# Patient Record
Sex: MALE | Race: WHITE | NOT HISPANIC OR LATINO | Employment: OTHER | URBAN - NONMETROPOLITAN AREA
[De-identification: names, ages, dates, MRNs, and addresses within clinical notes are randomized per-mention and may not be internally consistent; named-entity substitution may affect disease eponyms.]

---

## 2017-03-16 ENCOUNTER — APPOINTMENT (OUTPATIENT)
Dept: CT IMAGING | Facility: HOSPITAL | Age: 67
End: 2017-03-16

## 2017-03-16 ENCOUNTER — HOSPITAL ENCOUNTER (EMERGENCY)
Facility: HOSPITAL | Age: 67
Discharge: HOME | End: 2017-03-17
Attending: EMERGENCY MEDICINE | Admitting: EMERGENCY MEDICINE

## 2017-03-16 DIAGNOSIS — R51.9 HEADACHE, UNSPECIFIED HEADACHE TYPE: Primary | ICD-10-CM
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
## 2017-03-16 PROCEDURE — 70450 CT HEAD/BRAIN W/O DYE: CPT

## 2017-03-16 PROCEDURE — 99283 EMERGENCY DEPT VISIT LOW MDM: CPT

## 2017-03-16 PROCEDURE — 70450 CT HEAD/BRAIN W/O DYE: CPT | Performed by: RADIOLOGY

## 2017-03-17 VITALS
WEIGHT: 170 LBS | SYSTOLIC BLOOD PRESSURE: 125 MMHG | HEIGHT: 70 IN | TEMPERATURE: 97.6 F | RESPIRATION RATE: 18 BRPM | DIASTOLIC BLOOD PRESSURE: 71 MMHG | HEART RATE: 63 BPM | BODY MASS INDEX: 24.34 KG/M2 | OXYGEN SATURATION: 100 %
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
## 2017-06-12 ENCOUNTER — HOSPITAL ENCOUNTER (OUTPATIENT)
Dept: GENERAL RADIOLOGY | Facility: HOSPITAL | Age: 67
Discharge: HOME | End: 2017-06-12
Attending: INTERNAL MEDICINE | Admitting: INTERNAL MEDICINE

## 2017-06-12 ENCOUNTER — TRANSCRIBE ORDERS (OUTPATIENT)
Dept: GENERAL RADIOLOGY | Facility: HOSPITAL | Age: 67
End: 2017-06-12

## 2017-06-12 DIAGNOSIS — R07.81 RIB PAIN: ICD-10-CM

## 2017-06-12 DIAGNOSIS — R07.81 RIB PAIN: Primary | ICD-10-CM

## 2017-06-12 PROCEDURE — 71100 X-RAY EXAM RIBS UNI 2 VIEWS: CPT

## 2017-06-12 PROCEDURE — 71100 X-RAY EXAM RIBS UNI 2 VIEWS: CPT | Performed by: RADIOLOGY

## 2017-09-25 ENCOUNTER — EPISODE CHANGES (OUTPATIENT)
Dept: CASE MANAGEMENT | Facility: OTHER | Age: 67
End: 2017-09-25

## 2017-12-19 ENCOUNTER — OFFICE VISIT (OUTPATIENT)
Dept: CARDIOLOGY | Facility: CLINIC | Age: 67
End: 2017-12-19

## 2017-12-19 VITALS
WEIGHT: 170 LBS | DIASTOLIC BLOOD PRESSURE: 70 MMHG | HEART RATE: 54 BPM | BODY MASS INDEX: 24.34 KG/M2 | SYSTOLIC BLOOD PRESSURE: 118 MMHG | HEIGHT: 70 IN

## 2017-12-19 DIAGNOSIS — I25.10 CORONARY ARTERY DISEASE INVOLVING NATIVE CORONARY ARTERY OF NATIVE HEART WITHOUT ANGINA PECTORIS: Primary | ICD-10-CM

## 2017-12-19 DIAGNOSIS — E78.5 DYSLIPIDEMIA: ICD-10-CM

## 2017-12-19 DIAGNOSIS — I10 ESSENTIAL HYPERTENSION: ICD-10-CM

## 2017-12-19 PROCEDURE — 99214 OFFICE O/P EST MOD 30 MIN: CPT | Performed by: PHYSICIAN ASSISTANT

## 2017-12-19 NOTE — PROGRESS NOTES
Encounter Date:12/19/2017      Patient ID: Alfredito Marcus is a 67 y.o. male.    Chief Complaint: Coronary Artery Disease      PROBLEM LIST:  Patient Active Problem List    Diagnosis   • CAD (coronary artery disease) [I25.10]               a. Unstable angina with abnormal Cardiolite stress test, June 2008.  b. Cardiac cath by Dr. Garcia, 06/09/2008, showed 99% stenosis of the LAD treated with placement of two overlapping Cypher drug-coated stents along with balloon angioplasty of the first diagonal.  Rest of the vessels is free from significant disease and LV function was normal.  c. Cardiolite stress test, 03/23/2011, showed mild apical ischemia with ejection fraction 64%.  d. Cardiac cath by Dr. Garcia, November 2011, showed patent stents of the LAD, no significant disease, normal LV function.  e. [facility] 5-31-16: [place]: Non- flow-limiting coronary artery disease:.Widely patent LAD stents. Mild (20%-30%) disease in the LAD and RCA. . Normal left ventricular function.      • Hypertension [I10]                  • Dyslipidemia [E78.5]                  • Osteoarthritis [M19.90]   • Gout [M10.9]   • History of gluten intolerance [Z87.19]   • H/O degenerative disc disease [Z87.39]     Overview Note:     Degenerative disc disease of the cervical and lumbar spine/chronic back pain     • Closed fracture of proximal end of left humerus [S42.202A]         History of Present Illness  Patient presents today for follow-up with a history of CAD, hypertension, dyslipidemia.  Since her last visit his primary care physician has diagnosed diabetes mellitus type 2 and started him on metformin.  He has no current complaint of exertional chest pain or dyspnea, no orthopnea no PND no claudication.  He has no awareness of tachycardia arrhythmias, no dizziness or syncope.  He checks his blood pressure at home which generally runs about 125 systolic.  His cholesterol was last checked 2-3 months ago by primary care and reported to be  favorable.  On review of his medications I note that he is taking his Coreg 6.25 mg on a daily (a.m.) regimen.  He states he was having heart rates in the 50s which were asymptomatic and changed his regimen on his own.  He is scheduled to have right total hip replacement on March 20 of this year.  With the exception of Coreg he is otherwise compliant with his medications reports no significant side effects    Allergies   Allergen Reactions   • Iodine    • Shrimp (Diagnostic)          Current Outpatient Prescriptions:   •  ALPRAZolam (XANAX) 0.25 MG tablet, Take 0.25 mg by mouth 3 (three) times a day as needed for anxiety., Disp: , Rfl:   •  amLODIPine (NORVASC) 10 MG tablet, Take 10 mg by mouth daily., Disp: , Rfl:   •  aspirin  MG tablet, Take 325 mg by mouth every night., Disp: , Rfl:   •  carvedilol (COREG) 6.25 MG tablet, Take 6.25 mg by mouth, taking Once daily., Disp: , Rfl:   •  clopidogrel (PLAVIX) 75 MG tablet, Take 75 mg by mouth daily., Disp: , Rfl:   •  Coenzyme Q10 (COQ10 PO), Take 100 mg by mouth daily., Disp: , Rfl:   •  docusate sodium (COLACE) 100 MG capsule, Take 300 mg by mouth every night., Disp: , Rfl:   •  lansoprazole (PREVACID) 30 MG capsule, Take 30 mg by mouth daily., Disp: , Rfl:   •  latanoprost (XALATAN) 0.005 % ophthalmic solution, Administer 1 drop to the right eye every night., Disp: , Rfl:   •  metFORMIN (GLUCOPHAGE) 500 MG tablet, Take 750 mg by mouth Daily With Breakfast., Disp: , Rfl:   •  Omega-3-Acid Eth Est, Dietary, 1 G capsule, Take 1 capsule by mouth 2 (two) times a day., Disp: , Rfl:   •  pravastatin (PRAVACHOL) 20 MG tablet, Take 20 mg by mouth every night., Disp: , Rfl:   •  ranitidine (ZANTAC) 150 MG tablet, Take 150 mg by mouth every night., Disp: , Rfl:   •  tamsulosin (FLOMAX) 0.4 MG capsule 24 hr capsule, Take 1 capsule by mouth daily., Disp: , Rfl:   •  traMADol (ULTRAM) 50 MG tablet, Take 50 mg by mouth every 6 (six) hours as needed for moderate pain (4-6).,  "Disp: , Rfl:     The following portions of the patient's history were reviewed and updated as appropriate: allergies, current medications, past family history, past medical history, past social history, past surgical history and problem list.    Review of Systems   Constitution: Negative for diaphoresis, weakness, malaise/fatigue and weight gain.   Cardiovascular: Negative for chest pain, claudication, dyspnea on exertion, irregular heartbeat, leg swelling, orthopnea, palpitations, paroxysmal nocturnal dyspnea and syncope.   Respiratory: Negative for cough, shortness of breath, sleep disturbances due to breathing and wheezing.    Hematologic/Lymphatic: Negative for bleeding problem.   Musculoskeletal: Negative for muscle cramps, muscle weakness and myalgias.   Gastrointestinal: Negative for heartburn.         Objective:     Blood pressure 118/70, pulse 54, height 177.8 cm (70\"), weight 77.1 kg (170 lb).        Physical Exam   Constitutional: He is oriented to person, place, and time. He appears well-developed and well-nourished.   Cardiovascular: Normal rate, regular rhythm, normal heart sounds and intact distal pulses.  Exam reveals no gallop and no friction rub.    No murmur heard.  Pulmonary/Chest: Effort normal and breath sounds normal. No respiratory distress. He has no wheezes. He has no rales. He exhibits no tenderness.   Bases clear   Musculoskeletal: Normal range of motion. He exhibits no edema.   Neurological: He is alert and oriented to person, place, and time.         Lab Review:   Procedures       Assessment:      Diagnosis Plan   1. Coronary artery disease involving native coronary artery of native heart without angina pectoris  Stable    2. Essential hypertension  Change Coreg 6.25 mg back to twice daily dosing    3. Dyslipidemia  On statin therapy followed by primary care      Plan:   Cleared for Hip surgery from cardiac standpoint, may DC Plavix for procedure    Stable cardiac status.  Continue " current medications.   FU in 6 MO, sooner as needed.  Thank you for allowing us to participate in the care of your patient.       NILAY Yu  12/19/2017  2:04 PM      Please note that portions of this note may have been completed with a voice recognition program. Efforts were made to edit the dictations, but occasionally words are mistranscribed.

## 2018-01-09 ENCOUNTER — TRANSCRIBE ORDERS (OUTPATIENT)
Dept: ADMINISTRATIVE | Facility: HOSPITAL | Age: 68
End: 2018-01-09

## 2018-01-09 DIAGNOSIS — R10.84 ABDOMINAL PAIN, GENERALIZED: Primary | ICD-10-CM

## 2018-01-22 ENCOUNTER — HOSPITAL ENCOUNTER (OUTPATIENT)
Dept: ULTRASOUND IMAGING | Facility: HOSPITAL | Age: 68
Discharge: HOME | End: 2018-01-22
Attending: INTERNAL MEDICINE | Admitting: INTERNAL MEDICINE

## 2018-01-22 DIAGNOSIS — R10.84 ABDOMINAL PAIN, GENERALIZED: ICD-10-CM

## 2018-01-22 PROCEDURE — 76700 US EXAM ABDOM COMPLETE: CPT | Performed by: RADIOLOGY

## 2018-01-22 PROCEDURE — 76700 US EXAM ABDOM COMPLETE: CPT

## 2018-07-03 ENCOUNTER — OFFICE VISIT (OUTPATIENT)
Dept: CARDIOLOGY | Facility: CLINIC | Age: 68
End: 2018-07-03

## 2018-07-03 VITALS
HEIGHT: 71 IN | WEIGHT: 166 LBS | SYSTOLIC BLOOD PRESSURE: 100 MMHG | BODY MASS INDEX: 23.24 KG/M2 | DIASTOLIC BLOOD PRESSURE: 56 MMHG | HEART RATE: 59 BPM

## 2018-07-03 DIAGNOSIS — I25.10 CORONARY ARTERY DISEASE INVOLVING NATIVE CORONARY ARTERY OF NATIVE HEART WITHOUT ANGINA PECTORIS: Primary | ICD-10-CM

## 2018-07-03 DIAGNOSIS — E78.5 DYSLIPIDEMIA: ICD-10-CM

## 2018-07-03 DIAGNOSIS — I10 ESSENTIAL HYPERTENSION: ICD-10-CM

## 2018-07-03 PROCEDURE — 99214 OFFICE O/P EST MOD 30 MIN: CPT | Performed by: INTERNAL MEDICINE

## 2018-07-03 RX ORDER — LORATADINE 10 MG/1
10 TABLET ORAL DAILY
Status: ON HOLD | COMMUNITY
End: 2021-04-28

## 2018-07-03 RX ORDER — FOLIC ACID 1 MG/1
1 TABLET ORAL DAILY
COMMUNITY

## 2018-07-03 RX ORDER — MELATONIN
1000 2 TIMES DAILY
COMMUNITY

## 2018-07-03 NOTE — PROGRESS NOTES
Encounter Date:07/03/2018      Patient ID: Alfredito Marcus is a 68 y.o. male.    Chief Complaint: Coronary Artery Disease      PROBLEM LIST:  1. CAD  a. Unstable angina with abnormal Cardiolite stress test, June 2008.  b. Cardiac cath by Dr. Garcia, 06/09/2008, showed 99% stenosis of the LAD treated with placement of two overlapping Cypher drug-coated stents along with balloon angioplasty of the first diagonal.  Rest of the vessels is free from significant disease and LV function was normal.  c. Cardiolite stress test, 03/23/2011, showed mild apical ischemia with ejection fraction 64%.  d. Cardiac cath by Dr. Garcia, November 2011, showed patent stents of the LAD, no significant disease, normal LV function.  e. [facility] 5-31-16: [place]: Non- flow-limiting coronary artery disease:.Widely patent LAD stents. Mild (20%-30%) disease in the LAD and RCA. . Normal left ventricular function.   2. Hypertension  3. Dyslipidemia  4. Osteoarthritis   5. Gout  6. History of gluten intolerance  7. H/O degenerative disc disease  8. Closed fracture of proximal end of left humerus    History of Present Illness  Patient presents today for follow-up with a history of CAD and cardiac risk factors. Since last visit He had a hip replacement surgery which was uncomplicated.  He is now on physical therapy for bursitis.  He has no current complaint of exertional chest pain or dyspnea, and orthopnea no PND no lower extremity edema.  He has no awareness of tachycardia arrhythmias, no dizziness or syncope.  He checks his blood pressure at home and it generally runs about 117 systolic.  His last lipid panel was approximately 2 months ago with primary care and was reported to be favorable.  He reports compliance with all his medications reports no significant side effects.    Allergies   Allergen Reactions   • Iodine    • Shrimp (Diagnostic)          Current Outpatient Prescriptions:   •  ALPRAZolam (XANAX) 0.25 MG tablet, Take 0.25 mg by mouth 3  (three) times a day as needed for anxiety., Disp: , Rfl:   •  amLODIPine (NORVASC) 10 MG tablet, Take 10 mg by mouth daily., Disp: , Rfl:   •  aspirin  MG tablet, Take 325 mg by mouth every night., Disp: , Rfl:   •  carvedilol (COREG) 6.25 MG tablet, Take 6.25 mg by mouth 2 (two) times a day with meals., Disp: , Rfl:   •  cholecalciferol (VITAMIN D3) 1000 units tablet, Take 1,000 Units by mouth 2 (Two) Times a Day., Disp: , Rfl:   •  clopidogrel (PLAVIX) 75 MG tablet, Take 75 mg by mouth daily., Disp: , Rfl:   •  Coenzyme Q10 (COQ10 PO), Take 100 mg by mouth daily., Disp: , Rfl:   •  docusate sodium (COLACE) 100 MG capsule, Take 300 mg by mouth every night., Disp: , Rfl:   •  folic acid (FOLVITE) 1 MG tablet, Take 1 mg by mouth Daily., Disp: , Rfl:   •  lansoprazole (PREVACID) 30 MG capsule, Take 30 mg by mouth daily., Disp: , Rfl:   •  latanoprost (XALATAN) 0.005 % ophthalmic solution, Administer 1 drop to the right eye every night., Disp: , Rfl:   •  loratadine (CLARITIN) 10 MG tablet, Take 10 mg by mouth Daily., Disp: , Rfl:   •  Omega-3-Acid Eth Est, Dietary, 1 G capsule, Take 1 capsule by mouth 2 (two) times a day., Disp: , Rfl:   •  pravastatin (PRAVACHOL) 20 MG tablet, Take 20 mg by mouth every night., Disp: , Rfl:   •  ranitidine (ZANTAC) 150 MG tablet, Take 150 mg by mouth every night., Disp: , Rfl:   •  SITagliptin (JANUVIA) 100 MG tablet, Take 100 mg by mouth Daily., Disp: , Rfl:   •  tamsulosin (FLOMAX) 0.4 MG capsule 24 hr capsule, Take 1 capsule by mouth daily., Disp: , Rfl:   •  traMADol (ULTRAM) 50 MG tablet, Take 200 mg by mouth Daily., Disp: , Rfl:     The following portions of the patient's history were reviewed and updated as appropriate: allergies, current medications, past family history, past medical history, past social history, past surgical history and problem list.    ROS  Review of Systems   Constitution: Negative for chills, fever, weight gain and weight loss.   Cardiovascular:  "Negative for chest pain, claudication, dyspnea on exertion, leg swelling, orthopnea, palpitations, paroxysmal nocturnal dyspnea and syncope.        No dizziness   Gastrointestinal: Negative for abdominal pain, constipation, diarrhea, nausea and vomiting.   Genitourinary:        No urinary symptoms   Neurological:        No symptoms of stroke.   All other systems reviewed and are negative.    Objective:     Blood pressure 100/56, pulse 59, height 180.3 cm (71\"), weight 75.3 kg (166 lb).        Physical Exam  Constitutional: She appears well-developed and well-nourished.   HENT:   HEENT exam unremarkable.   Neck: Neck supple. No JVD present.   No carotid bruits.   Cardiovascular: Normal rate, regular rhythm and normal heart sounds.    No murmur heard.  2 plus symmetric pulses.   Pulmonary/Chest: Breath sounds normal. Does not exhibit tenderness.   Abdominal:   Abdomen benign.   Musculoskeletal: Does not exhibit edema.   Neurological:   Neurological exam unremarkable.   Vitals reviewed.    Lab Review:   Procedures       Assessment:      Diagnosis Plan   1. Coronary artery disease involving native coronary artery of native heart without angina pectoris  Stable    2. Dyslipidemia  On statin therapy followed by primary care    3. Essential hypertension  Well controlled current medical regimen      Plan:   Stable CV status.  Continue current medications.   FU in 6 MO, sooner as needed.  Thank you for allowing us to participate in the care of your patient.     Scribed for Grady Garcia MD by Jos Salvador PA-C. 7/3/2018  1:22 PM     IGrady MD, personally performed the services described in this documentation as scribed by the above named individual in my presence, and it is both accurate and complete.  7/3/2018  1:26 PM          Please note that portions of this note may have been completed with a voice recognition program. Efforts were made to edit the dictations, but occasionally words are " mistranscribed.

## 2018-07-03 NOTE — PROGRESS NOTES
Encounter Date:07/03/2018      Patient ID: Alfredito Marcus is a 68 y.o. male.    Chief Complaint: No chief complaint on file.      PROBLEM LIST:  1. ***    History of Present Illness  Patient presents today for follow-up with a history of *** and cardiac risk factors. Since last visit ***. Denies chest pain, shortness of breath, leg swelling, palpitations, and syncope. Remains busy and active with ***.    Allergies   Allergen Reactions   • Iodine    • Shrimp (Diagnostic)          Current Outpatient Prescriptions:   •  ALPRAZolam (XANAX) 0.25 MG tablet, Take 0.25 mg by mouth 3 (three) times a day as needed for anxiety., Disp: , Rfl:   •  amLODIPine (NORVASC) 10 MG tablet, Take 10 mg by mouth daily., Disp: , Rfl:   •  aspirin  MG tablet, Take 325 mg by mouth every night., Disp: , Rfl:   •  carvedilol (COREG) 6.25 MG tablet, Take 6.25 mg by mouth 2 (two) times a day with meals., Disp: , Rfl:   •  clopidogrel (PLAVIX) 75 MG tablet, Take 75 mg by mouth daily., Disp: , Rfl:   •  Coenzyme Q10 (COQ10 PO), Take 100 mg by mouth daily., Disp: , Rfl:   •  docusate sodium (COLACE) 100 MG capsule, Take 300 mg by mouth every night., Disp: , Rfl:   •  lansoprazole (PREVACID) 30 MG capsule, Take 30 mg by mouth daily., Disp: , Rfl:   •  latanoprost (XALATAN) 0.005 % ophthalmic solution, Administer 1 drop to the right eye every night., Disp: , Rfl:   •  metFORMIN (GLUCOPHAGE) 500 MG tablet, Take 750 mg by mouth Daily With Breakfast., Disp: , Rfl:   •  Omega-3-Acid Eth Est, Dietary, 1 G capsule, Take 1 capsule by mouth 2 (two) times a day., Disp: , Rfl:   •  pravastatin (PRAVACHOL) 20 MG tablet, Take 20 mg by mouth every night., Disp: , Rfl:   •  ranitidine (ZANTAC) 150 MG tablet, Take 150 mg by mouth every night., Disp: , Rfl:   •  tamsulosin (FLOMAX) 0.4 MG capsule 24 hr capsule, Take 1 capsule by mouth daily., Disp: , Rfl:   •  traMADol (ULTRAM) 50 MG tablet, Take 50 mg by mouth every 6 (six) hours as needed for moderate  pain (4-6)., Disp: , Rfl:     The following portions of the patient's history were reviewed and updated as appropriate: allergies, current medications, past family history, past medical history, past social history, past surgical history and problem list.    ROS  Review of Systems   Constitution: Negative for chills, fever, weight gain and weight loss.   Cardiovascular: Negative for chest pain, claudication, dyspnea on exertion, leg swelling, orthopnea, palpitations, paroxysmal nocturnal dyspnea and syncope.        No dizziness   Gastrointestinal: Negative for abdominal pain, constipation, diarrhea, nausea and vomiting.   Genitourinary:        No urinary symptoms   Neurological:        No symptoms of stroke.   All other systems reviewed and are negative.    Objective:     There were no vitals taken for this visit.  Repeat blood pressure measurement by, Grady Garcia MD, at ***      Physical Exam  Constitutional: She appears well-developed and well-nourished.   HENT:   HEENT exam unremarkable.   Neck: Neck supple. No JVD present.   No carotid bruits.   Cardiovascular: Normal rate, regular rhythm and normal heart sounds.    No murmur heard.  2 plus symmetric pulses.   Pulmonary/Chest: Breath sounds normal. Does not exhibit tenderness.   Abdominal:   Abdomen benign.   Musculoskeletal: Does not exhibit edema.   Neurological:   Neurological exam unremarkable.   Vitals reviewed.    Lab Review:   Procedures       Assessment:   No diagnosis found.  Plan:   ***  Continue current medications.   FU in 6 MO, sooner as needed.  Thank you for allowing us to participate in the care of your patient.     Scribed for Grady Garcia MD by Arley Briggs. 7/3/2018  11:06 AM    ***    Please note that portions of this note may have been completed with a voice recognition program. Efforts were made to edit the dictations, but occasionally words are mistranscribed.

## 2018-08-02 ENCOUNTER — TELEPHONE (OUTPATIENT)
Dept: CARDIOLOGY | Facility: CLINIC | Age: 68
End: 2018-08-02

## 2018-08-02 NOTE — TELEPHONE ENCOUNTER
Patient needs cardiac clearance for a lap sneha with Dr. Humphries to be scheduled.  Patient was last seen in the office on 7-3-18.  They would like the patient to hold plavix/ASA prior to surgery.    Please advise.    NILAY Marquez  P: [phone]  F: [phone]

## 2018-08-20 ENCOUNTER — APPOINTMENT (OUTPATIENT)
Dept: CT IMAGING | Facility: HOSPITAL | Age: 68
End: 2018-08-20

## 2018-08-20 ENCOUNTER — HOSPITAL ENCOUNTER (EMERGENCY)
Facility: HOSPITAL | Age: 68
Discharge: HOME | End: 2018-08-20
Attending: EMERGENCY MEDICINE | Admitting: EMERGENCY MEDICINE

## 2018-08-20 VITALS
BODY MASS INDEX: 23.19 KG/M2 | WEIGHT: 162 LBS | SYSTOLIC BLOOD PRESSURE: 136 MMHG | TEMPERATURE: 97.8 F | HEIGHT: 70 IN | OXYGEN SATURATION: 98 % | DIASTOLIC BLOOD PRESSURE: 81 MMHG | RESPIRATION RATE: 18 BRPM | HEART RATE: 51 BPM

## 2018-08-20 DIAGNOSIS — R10.9 ABDOMINAL PAIN, UNSPECIFIED ABDOMINAL LOCATION: Primary | ICD-10-CM

## 2018-08-20 LAB
ALBUMIN SERPL-MCNC: 4.5 G/DL (ref 3.4–4.8)
ALBUMIN/GLOB SERPL: 1.6 G/DL (ref 1.5–2.5)
ALP SERPL-CCNC: 124 U/L (ref 40–129)
ALT SERPL W P-5'-P-CCNC: 35 U/L (ref 10–44)
ANION GAP SERPL CALCULATED.3IONS-SCNC: 7.2 MMOL/L (ref 3.6–11.2)
AST SERPL-CCNC: 46 U/L (ref 10–34)
BASOPHILS # BLD AUTO: 0.06 10*3/MM3 (ref 0–0.3)
BASOPHILS NFR BLD AUTO: 0.6 % (ref 0–2)
BILIRUB SERPL-MCNC: 1.3 MG/DL (ref 0.2–1.8)
BUN BLD-MCNC: 13 MG/DL (ref 7–21)
BUN/CREAT SERPL: 13.4 (ref 7–25)
CALCIUM SPEC-SCNC: 9.3 MG/DL (ref 7.7–10)
CHLORIDE SERPL-SCNC: 102 MMOL/L (ref 99–112)
CO2 SERPL-SCNC: 28.8 MMOL/L (ref 24.3–31.9)
CREAT BLD-MCNC: 0.97 MG/DL (ref 0.43–1.29)
D-LACTATE SERPL-SCNC: 0.9 MMOL/L (ref 0.5–2)
DEPRECATED RDW RBC AUTO: 43.9 FL (ref 37–54)
EOSINOPHIL # BLD AUTO: 0.57 10*3/MM3 (ref 0–0.7)
EOSINOPHIL NFR BLD AUTO: 6.1 % (ref 0–7)
ERYTHROCYTE [DISTWIDTH] IN BLOOD BY AUTOMATED COUNT: 13.8 % (ref 11.5–14.5)
GFR SERPL CREATININE-BSD FRML MDRD: 77 ML/MIN/1.73
GLOBULIN UR ELPH-MCNC: 2.9 GM/DL
GLUCOSE BLD-MCNC: 214 MG/DL (ref 70–110)
HCT VFR BLD AUTO: 44.5 % (ref 42–52)
HGB BLD-MCNC: 15.5 G/DL (ref 14–18)
IMM GRANULOCYTES # BLD: 0.06 10*3/MM3 (ref 0–0.03)
IMM GRANULOCYTES NFR BLD: 0.6 % (ref 0–0.5)
LIPASE SERPL-CCNC: 55 U/L (ref 13–60)
LYMPHOCYTES # BLD AUTO: 2.08 10*3/MM3 (ref 1–3)
LYMPHOCYTES NFR BLD AUTO: 22.1 % (ref 16–46)
MCH RBC QN AUTO: 31 PG (ref 27–33)
MCHC RBC AUTO-ENTMCNC: 34.8 G/DL (ref 33–37)
MCV RBC AUTO: 89 FL (ref 80–94)
MONOCYTES # BLD AUTO: 0.85 10*3/MM3 (ref 0.1–0.9)
MONOCYTES NFR BLD AUTO: 9 % (ref 0–12)
NEUTROPHILS # BLD AUTO: 5.8 10*3/MM3 (ref 1.4–6.5)
NEUTROPHILS NFR BLD AUTO: 61.6 % (ref 40–75)
OSMOLALITY SERPL CALC.SUM OF ELEC: 282.2 MOSM/KG (ref 273–305)
PLATELET # BLD AUTO: 240 10*3/MM3 (ref 130–400)
PMV BLD AUTO: 10.9 FL (ref 6–10)
POTASSIUM BLD-SCNC: 3.7 MMOL/L (ref 3.5–5.3)
PROT SERPL-MCNC: 7.4 G/DL (ref 6–8)
RBC # BLD AUTO: 5 10*6/MM3 (ref 4.7–6.1)
SODIUM BLD-SCNC: 138 MMOL/L (ref 135–153)
TROPONIN I SERPL-MCNC: <0.006 NG/ML
WBC NRBC COR # BLD: 9.42 10*3/MM3 (ref 4.5–12.5)

## 2018-08-20 PROCEDURE — 96374 THER/PROPH/DIAG INJ IV PUSH: CPT

## 2018-08-20 PROCEDURE — 25010000002 HYDROMORPHONE PER 4 MG: Performed by: EMERGENCY MEDICINE

## 2018-08-20 PROCEDURE — 80053 COMPREHEN METABOLIC PANEL: CPT | Performed by: EMERGENCY MEDICINE

## 2018-08-20 PROCEDURE — 74176 CT ABD & PELVIS W/O CONTRAST: CPT

## 2018-08-20 PROCEDURE — 83605 ASSAY OF LACTIC ACID: CPT | Performed by: EMERGENCY MEDICINE

## 2018-08-20 PROCEDURE — 96361 HYDRATE IV INFUSION ADD-ON: CPT

## 2018-08-20 PROCEDURE — 93010 ELECTROCARDIOGRAM REPORT: CPT | Performed by: INTERNAL MEDICINE

## 2018-08-20 PROCEDURE — 96375 TX/PRO/DX INJ NEW DRUG ADDON: CPT

## 2018-08-20 PROCEDURE — 74176 CT ABD & PELVIS W/O CONTRAST: CPT | Performed by: RADIOLOGY

## 2018-08-20 PROCEDURE — 83690 ASSAY OF LIPASE: CPT | Performed by: EMERGENCY MEDICINE

## 2018-08-20 PROCEDURE — 99284 EMERGENCY DEPT VISIT MOD MDM: CPT

## 2018-08-20 PROCEDURE — 93005 ELECTROCARDIOGRAM TRACING: CPT | Performed by: EMERGENCY MEDICINE

## 2018-08-20 PROCEDURE — 96376 TX/PRO/DX INJ SAME DRUG ADON: CPT

## 2018-08-20 PROCEDURE — 84484 ASSAY OF TROPONIN QUANT: CPT | Performed by: EMERGENCY MEDICINE

## 2018-08-20 PROCEDURE — 25010000002 ONDANSETRON PER 1 MG: Performed by: EMERGENCY MEDICINE

## 2018-08-20 PROCEDURE — 85025 COMPLETE CBC W/AUTO DIFF WBC: CPT | Performed by: EMERGENCY MEDICINE

## 2018-08-20 RX ORDER — HYDROMORPHONE HYDROCHLORIDE 1 MG/ML
0.5 INJECTION, SOLUTION INTRAMUSCULAR; INTRAVENOUS; SUBCUTANEOUS ONCE
Status: COMPLETED | OUTPATIENT
Start: 2018-08-20 | End: 2018-08-20

## 2018-08-20 RX ORDER — MAG HYDROX/ALUMINUM HYD/SIMETH 400-400-40
1 SUSPENSION, ORAL (FINAL DOSE FORM) ORAL AS NEEDED
Qty: 10 SUPPOSITORY | Refills: 0 | Status: SHIPPED | OUTPATIENT
Start: 2018-08-20 | End: 2019-07-16

## 2018-08-20 RX ORDER — ONDANSETRON 2 MG/ML
4 INJECTION INTRAMUSCULAR; INTRAVENOUS ONCE
Status: COMPLETED | OUTPATIENT
Start: 2018-08-20 | End: 2018-08-20

## 2018-08-20 RX ORDER — BISACODYL 10 MG
10 SUPPOSITORY, RECTAL RECTAL ONCE
Status: COMPLETED | OUTPATIENT
Start: 2018-08-20 | End: 2018-08-20

## 2018-08-20 RX ADMIN — HYDROMORPHONE HYDROCHLORIDE 0.5 MG: 1 INJECTION, SOLUTION INTRAMUSCULAR; INTRAVENOUS; SUBCUTANEOUS at 01:54

## 2018-08-20 RX ADMIN — SODIUM CHLORIDE 1000 ML: 9 INJECTION, SOLUTION INTRAVENOUS at 00:57

## 2018-08-20 RX ADMIN — BISACODYL 10 MG: 10 SUPPOSITORY RECTAL at 01:56

## 2018-08-20 RX ADMIN — ONDANSETRON 4 MG: 2 INJECTION, SOLUTION INTRAMUSCULAR; INTRAVENOUS at 01:04

## 2018-08-20 RX ADMIN — HYDROMORPHONE HYDROCHLORIDE 1 MG: 1 INJECTION, SOLUTION INTRAMUSCULAR; INTRAVENOUS; SUBCUTANEOUS at 01:00
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
## 2018-09-10 ENCOUNTER — TRANSCRIBE ORDERS (OUTPATIENT)
Dept: ADMINISTRATIVE | Facility: HOSPITAL | Age: 68
End: 2018-09-10

## 2018-09-10 DIAGNOSIS — R10.11 ABDOMINAL PAIN, RIGHT UPPER QUADRANT: Primary | ICD-10-CM

## 2018-09-13 ENCOUNTER — HOSPITAL ENCOUNTER (OUTPATIENT)
Dept: MRI IMAGING | Facility: HOSPITAL | Age: 68
Discharge: HOME | End: 2018-09-13
Attending: SURGERY | Admitting: SURGERY

## 2018-09-13 DIAGNOSIS — R10.11 ABDOMINAL PAIN, RIGHT UPPER QUADRANT: ICD-10-CM

## 2018-09-13 PROCEDURE — 74181 MRI ABDOMEN W/O CONTRAST: CPT

## 2018-09-13 PROCEDURE — 74181 MRI ABDOMEN W/O CONTRAST: CPT | Performed by: RADIOLOGY

## 2018-09-28 ENCOUNTER — TRANSCRIBE ORDERS (OUTPATIENT)
Dept: ADMINISTRATIVE | Facility: HOSPITAL | Age: 68
End: 2018-09-28

## 2018-09-28 DIAGNOSIS — M25.551 PAIN OF RIGHT HIP JOINT: Primary | ICD-10-CM

## 2018-09-28 DIAGNOSIS — M79.18 BUTTOCK PAIN: ICD-10-CM

## 2018-10-26 ENCOUNTER — TRANSCRIBE ORDERS (OUTPATIENT)
Dept: ADMINISTRATIVE | Facility: HOSPITAL | Age: 68
End: 2018-10-26

## 2018-10-26 DIAGNOSIS — M25.551 RIGHT HIP PAIN: Primary | ICD-10-CM

## 2018-10-30 ENCOUNTER — HOSPITAL ENCOUNTER (OUTPATIENT)
Dept: MRI IMAGING | Facility: HOSPITAL | Age: 68
Discharge: HOME | End: 2018-10-30
Attending: INTERNAL MEDICINE

## 2018-10-30 DIAGNOSIS — M25.551 RIGHT HIP PAIN: ICD-10-CM

## 2018-11-02 ENCOUNTER — HOSPITAL ENCOUNTER (OUTPATIENT)
Dept: MRI IMAGING | Facility: HOSPITAL | Age: 68
Discharge: HOME | End: 2018-11-02
Admitting: ORTHOPAEDIC SURGERY

## 2018-11-02 DIAGNOSIS — M25.551 PAIN OF RIGHT HIP JOINT: ICD-10-CM

## 2018-11-02 DIAGNOSIS — M79.18 BUTTOCK PAIN: ICD-10-CM

## 2018-11-02 PROCEDURE — 72148 MRI LUMBAR SPINE W/O DYE: CPT | Performed by: RADIOLOGY

## 2018-11-02 PROCEDURE — 72148 MRI LUMBAR SPINE W/O DYE: CPT

## 2018-12-11 ENCOUNTER — HOSPITAL ENCOUNTER (EMERGENCY)
Facility: HOSPITAL | Age: 68
Discharge: HOME | End: 2018-12-11
Attending: EMERGENCY MEDICINE | Admitting: EMERGENCY MEDICINE

## 2018-12-11 ENCOUNTER — APPOINTMENT (OUTPATIENT)
Dept: CT IMAGING | Facility: HOSPITAL | Age: 68
End: 2018-12-11

## 2018-12-11 ENCOUNTER — APPOINTMENT (OUTPATIENT)
Dept: GENERAL RADIOLOGY | Facility: HOSPITAL | Age: 68
End: 2018-12-11

## 2018-12-11 VITALS
HEIGHT: 70 IN | OXYGEN SATURATION: 98 % | BODY MASS INDEX: 23.62 KG/M2 | HEART RATE: 82 BPM | SYSTOLIC BLOOD PRESSURE: 124 MMHG | WEIGHT: 165 LBS | DIASTOLIC BLOOD PRESSURE: 74 MMHG | TEMPERATURE: 98.4 F | RESPIRATION RATE: 18 BRPM

## 2018-12-11 DIAGNOSIS — S32.009A CLOSED FRACTURE OF TRANSVERSE PROCESS OF LUMBAR VERTEBRA, INITIAL ENCOUNTER (HCC): Primary | ICD-10-CM

## 2018-12-11 LAB
ALBUMIN SERPL-MCNC: 4.6 G/DL (ref 3.4–4.8)
ALBUMIN/GLOB SERPL: 1.6 G/DL (ref 1.5–2.5)
ALP SERPL-CCNC: 86 U/L (ref 40–129)
ALT SERPL W P-5'-P-CCNC: 22 U/L (ref 10–44)
AMYLASE SERPL-CCNC: 56 U/L (ref 28–100)
ANION GAP SERPL CALCULATED.3IONS-SCNC: 8.3 MMOL/L (ref 3.6–11.2)
AST SERPL-CCNC: 25 U/L (ref 10–34)
BASOPHILS # BLD AUTO: 0.04 10*3/MM3 (ref 0–0.3)
BASOPHILS NFR BLD AUTO: 0.6 % (ref 0–2)
BILIRUB SERPL-MCNC: 1.8 MG/DL (ref 0.2–1.8)
BUN BLD-MCNC: 16 MG/DL (ref 7–21)
BUN/CREAT SERPL: 16.2 (ref 7–25)
CALCIUM SPEC-SCNC: 9.5 MG/DL (ref 7.7–10)
CHLORIDE SERPL-SCNC: 102 MMOL/L (ref 99–112)
CO2 SERPL-SCNC: 28.7 MMOL/L (ref 24.3–31.9)
CREAT BLD-MCNC: 0.99 MG/DL (ref 0.43–1.29)
DEPRECATED RDW RBC AUTO: 42.1 FL (ref 37–54)
EOSINOPHIL # BLD AUTO: 0.19 10*3/MM3 (ref 0–0.7)
EOSINOPHIL NFR BLD AUTO: 2.7 % (ref 0–7)
ERYTHROCYTE [DISTWIDTH] IN BLOOD BY AUTOMATED COUNT: 12.9 % (ref 11.5–14.5)
GFR SERPL CREATININE-BSD FRML MDRD: 75 ML/MIN/1.73
GLOBULIN UR ELPH-MCNC: 2.9 GM/DL
GLUCOSE BLD-MCNC: 139 MG/DL (ref 70–110)
HCT VFR BLD AUTO: 43.9 % (ref 42–52)
HGB BLD-MCNC: 15.2 G/DL (ref 14–18)
IMM GRANULOCYTES # BLD: 0.01 10*3/MM3 (ref 0–0.03)
IMM GRANULOCYTES NFR BLD: 0.1 % (ref 0–0.5)
LIPASE SERPL-CCNC: 35 U/L (ref 13–60)
LYMPHOCYTES # BLD AUTO: 1.22 10*3/MM3 (ref 1–3)
LYMPHOCYTES NFR BLD AUTO: 17 % (ref 16–46)
MCH RBC QN AUTO: 31.8 PG (ref 27–33)
MCHC RBC AUTO-ENTMCNC: 34.6 G/DL (ref 33–37)
MCV RBC AUTO: 91.8 FL (ref 80–94)
MONOCYTES # BLD AUTO: 0.81 10*3/MM3 (ref 0.1–0.9)
MONOCYTES NFR BLD AUTO: 11.3 % (ref 0–12)
NEUTROPHILS # BLD AUTO: 4.89 10*3/MM3 (ref 1.4–6.5)
NEUTROPHILS NFR BLD AUTO: 68.3 % (ref 40–75)
OSMOLALITY SERPL CALC.SUM OF ELEC: 281 MOSM/KG (ref 273–305)
PLATELET # BLD AUTO: 175 10*3/MM3 (ref 130–400)
PMV BLD AUTO: 11.1 FL (ref 6–10)
POTASSIUM BLD-SCNC: 4.6 MMOL/L (ref 3.5–5.3)
PROT SERPL-MCNC: 7.5 G/DL (ref 6–8)
RBC # BLD AUTO: 4.78 10*6/MM3 (ref 4.7–6.1)
SODIUM BLD-SCNC: 139 MMOL/L (ref 135–153)
WBC NRBC COR # BLD: 7.16 10*3/MM3 (ref 4.5–12.5)

## 2018-12-11 PROCEDURE — 72128 CT CHEST SPINE W/O DYE: CPT | Performed by: RADIOLOGY

## 2018-12-11 PROCEDURE — 72128 CT CHEST SPINE W/O DYE: CPT

## 2018-12-11 PROCEDURE — 70450 CT HEAD/BRAIN W/O DYE: CPT | Performed by: RADIOLOGY

## 2018-12-11 PROCEDURE — 70450 CT HEAD/BRAIN W/O DYE: CPT

## 2018-12-11 PROCEDURE — 73523 X-RAY EXAM HIPS BI 5/> VIEWS: CPT | Performed by: RADIOLOGY

## 2018-12-11 PROCEDURE — 72125 CT NECK SPINE W/O DYE: CPT

## 2018-12-11 PROCEDURE — 72131 CT LUMBAR SPINE W/O DYE: CPT | Performed by: RADIOLOGY

## 2018-12-11 PROCEDURE — 74176 CT ABD & PELVIS W/O CONTRAST: CPT

## 2018-12-11 PROCEDURE — 99284 EMERGENCY DEPT VISIT MOD MDM: CPT

## 2018-12-11 PROCEDURE — 25010000002 ONDANSETRON PER 1 MG: Performed by: PHYSICIAN ASSISTANT

## 2018-12-11 PROCEDURE — 80053 COMPREHEN METABOLIC PANEL: CPT | Performed by: PHYSICIAN ASSISTANT

## 2018-12-11 PROCEDURE — 72131 CT LUMBAR SPINE W/O DYE: CPT

## 2018-12-11 PROCEDURE — 96361 HYDRATE IV INFUSION ADD-ON: CPT

## 2018-12-11 PROCEDURE — 36415 COLL VENOUS BLD VENIPUNCTURE: CPT

## 2018-12-11 PROCEDURE — 71045 X-RAY EXAM CHEST 1 VIEW: CPT

## 2018-12-11 PROCEDURE — 96375 TX/PRO/DX INJ NEW DRUG ADDON: CPT

## 2018-12-11 PROCEDURE — 73523 X-RAY EXAM HIPS BI 5/> VIEWS: CPT

## 2018-12-11 PROCEDURE — 25010000002 MORPHINE PER 10 MG: Performed by: EMERGENCY MEDICINE

## 2018-12-11 PROCEDURE — 71045 X-RAY EXAM CHEST 1 VIEW: CPT | Performed by: RADIOLOGY

## 2018-12-11 PROCEDURE — 96374 THER/PROPH/DIAG INJ IV PUSH: CPT

## 2018-12-11 PROCEDURE — 83690 ASSAY OF LIPASE: CPT | Performed by: PHYSICIAN ASSISTANT

## 2018-12-11 PROCEDURE — 85025 COMPLETE CBC W/AUTO DIFF WBC: CPT | Performed by: PHYSICIAN ASSISTANT

## 2018-12-11 PROCEDURE — 72125 CT NECK SPINE W/O DYE: CPT | Performed by: RADIOLOGY

## 2018-12-11 PROCEDURE — 74176 CT ABD & PELVIS W/O CONTRAST: CPT | Performed by: RADIOLOGY

## 2018-12-11 PROCEDURE — 82150 ASSAY OF AMYLASE: CPT | Performed by: PHYSICIAN ASSISTANT

## 2018-12-11 RX ORDER — HYDROCODONE BITARTRATE AND ACETAMINOPHEN 7.5; 325 MG/1; MG/1
1 TABLET ORAL ONCE
Status: COMPLETED | OUTPATIENT
Start: 2018-12-11 | End: 2018-12-11

## 2018-12-11 RX ORDER — SODIUM CHLORIDE 9 MG/ML
125 INJECTION, SOLUTION INTRAVENOUS CONTINUOUS
Status: DISCONTINUED | OUTPATIENT
Start: 2018-12-11 | End: 2018-12-11 | Stop reason: HOSPADM

## 2018-12-11 RX ORDER — SODIUM CHLORIDE 0.9 % (FLUSH) 0.9 %
10 SYRINGE (ML) INJECTION AS NEEDED
Status: DISCONTINUED | OUTPATIENT
Start: 2018-12-11 | End: 2018-12-11 | Stop reason: HOSPADM

## 2018-12-11 RX ORDER — MORPHINE SULFATE 2 MG/ML
2 INJECTION, SOLUTION INTRAMUSCULAR; INTRAVENOUS ONCE
Status: COMPLETED | OUTPATIENT
Start: 2018-12-11 | End: 2018-12-11

## 2018-12-11 RX ORDER — HYDROCODONE BITARTRATE AND ACETAMINOPHEN 7.5; 325 MG/1; MG/1
1 TABLET ORAL EVERY 6 HOURS PRN
Qty: 10 TABLET | Refills: 0 | Status: SHIPPED | OUTPATIENT
Start: 2018-12-11 | End: 2019-07-16

## 2018-12-11 RX ORDER — ONDANSETRON 2 MG/ML
4 INJECTION INTRAMUSCULAR; INTRAVENOUS ONCE
Status: COMPLETED | OUTPATIENT
Start: 2018-12-11 | End: 2018-12-11

## 2018-12-11 RX ADMIN — MORPHINE SULFATE 2 MG: 2 INJECTION, SOLUTION INTRAMUSCULAR; INTRAVENOUS at 16:47

## 2018-12-11 RX ADMIN — HYDROCODONE BITARTRATE AND ACETAMINOPHEN 1 TABLET: 7.5; 325 TABLET ORAL at 18:33

## 2018-12-11 RX ADMIN — SODIUM CHLORIDE 125 ML/HR: 9 INJECTION, SOLUTION INTRAVENOUS at 16:47

## 2018-12-11 RX ADMIN — ONDANSETRON 4 MG: 2 INJECTION, SOLUTION INTRAMUSCULAR; INTRAVENOUS at 16:47

## 2018-12-12 NOTE — ED PROVIDER NOTES
Subjective   68-year-old male presents to the ED today after a fall.  He states he was walking out of his garage and the door was half open.  He states he hit his head on the garage door which caused him to fall backwards and landed against a storage binge.  He is having pain to the left side of his back.  He is also having bilateral hip pain, left worse than right.  He denies any loss of consciousness.  He states he is having some mild neck pain.  He denies any chest pain or shortness of breath.  He denies any abdominal pain.  He denies any nausea or vomiting.        History provided by:  Patient  Fall   Mechanism of injury: fall    Injury location:  Torso and head/neck  Head/neck injury location:  Head  Torso injury location:  Back  Incident location: garage.  Time since incident:  2 hours  Fall:     Fall occurred:  Standing    Impact surface: storage bench.    Point of impact:  Back  Associated symptoms: back pain, headaches and neck pain    Associated symptoms: no abdominal pain, no blindness, no chest pain, no difficulty breathing, no hearing loss, no loss of consciousness, no nausea, no seizures and no vomiting        Review of Systems   Constitutional: Negative.    HENT: Negative for hearing loss.    Eyes: Negative.  Negative for blindness.   Respiratory: Negative.    Cardiovascular: Negative for chest pain.   Gastrointestinal: Negative for abdominal pain, nausea and vomiting.   Genitourinary: Negative.    Musculoskeletal: Positive for back pain and neck pain.   Skin: Negative.    Neurological: Positive for headaches. Negative for seizures and loss of consciousness.   Psychiatric/Behavioral: Negative.    All other systems reviewed and are negative.      Past Medical History:   Diagnosis Date   • CAD (coronary artery disease)    • Dyslipidemia    • GERD (gastroesophageal reflux disease)    • Gout    • H/O degenerative disc disease     Degenerative disc disease of the cervical and lumbar spine/chronic back pain    • History of gluten intolerance    • Hyperlipidemia    • Hypertension    • Myocardial infarction    • Osteoarthritis        Allergies   Allergen Reactions   • Iodine    • Shrimp (Diagnostic)        Past Surgical History:   Procedure Laterality Date   • APPENDECTOMY     • CORONARY ANGIOPLASTY WITH STENT PLACEMENT     • REPLACEMENT TOTAL HIP LATERAL POSITION Right 2018       Family History   Problem Relation Age of Onset   • No Known Problems Mother    • No Known Problems Father        Social History     Socioeconomic History   • Marital status:      Spouse name: Not on file   • Number of children: Not on file   • Years of education: Not on file   • Highest education level: Not on file   Tobacco Use   • Smoking status: Never Smoker   • Smokeless tobacco: Never Used   Substance and Sexual Activity   • Alcohol use: No   • Drug use: No   • Sexual activity: Defer           Objective   Physical Exam   Constitutional: He is oriented to person, place, and time. He appears well-developed and well-nourished. He appears distressed (appears to be in pain).   HENT:   Head: Normocephalic and atraumatic.   Right Ear: External ear normal.   Left Ear: External ear normal.   Nose: Nose normal.   Mouth/Throat: Oropharynx is clear and moist.   Eyes: Conjunctivae and EOM are normal. Pupils are equal, round, and reactive to light.   Neck: Normal range of motion. Neck supple. Spinous process tenderness (mild to lower cervical spine) present.   Cardiovascular: Normal rate, regular rhythm, normal heart sounds and intact distal pulses.   Pulmonary/Chest: Effort normal and breath sounds normal. He exhibits no tenderness.   Abdominal: Soft. Bowel sounds are normal. There is no tenderness. There is no rebound and no guarding.   Musculoskeletal: Normal range of motion. He exhibits tenderness. He exhibits no deformity.   Pain to the left lower back, with bruising and swelling over the left CVA area.   Neurological: He is alert and  oriented to person, place, and time.   Skin: Skin is warm and dry. Capillary refill takes less than 2 seconds.   Psychiatric: He has a normal mood and affect. His behavior is normal. Judgment and thought content normal.   Nursing note and vitals reviewed.      Splint - Cast - Strapping  Performed by: Jocelynn Ogden PA  Authorized by: Kareem Moore MD     Consent:     Consent obtained:  Verbal    Consent given by:  Patient  Pre-procedure details:     Sensation:  Normal  Procedure details:     Location: back.    Splint type: lumbar back brace.    Supplies:  Prefabricated splint  Post-procedure details:     Pain:  Unchanged    Sensation:  Normal    Patient tolerance of procedure:  Tolerated well, no immediate complications               ED Course  ED Course as of Dec 11 1956   Tue Dec 11, 2018   1830 Impression     1. Nondisplaced left L1 transverse process fracture.  2. No vertebral body fracture noted.  3. Multilevel degenerative changes as detailed above with degenerative  type spondylolisthesis and multilevel stenosis.    XR of the chest, hips and pelvis are negative per Dr. Bell.    Plan is to place patient in a lumbar brace with pain medication and he will follow up outpatient.  [AH]      ED Course User Index  [AH] Jocelynn Ogden PA                  MDM  Number of Diagnoses or Management Options  Closed fracture of transverse process of lumbar vertebra, initial encounter (CMS/[facility]):      Amount and/or Complexity of Data Reviewed  Clinical lab tests: reviewed  Tests in the radiology section of CPT®: reviewed    Patient Progress  Patient progress: improved        Final diagnoses:   Closed fracture of transverse process of lumbar vertebra, initial encounter (CMS/[facility])            Jocelynn Ogden PA  12/11/18 1956

## 2019-03-18 ENCOUNTER — HOSPITAL ENCOUNTER (OUTPATIENT)
Dept: BONE DENSITY | Facility: HOSPITAL | Age: 69
Discharge: HOME | End: 2019-03-18
Admitting: INTERNAL MEDICINE

## 2019-03-18 DIAGNOSIS — M81.0 SENILE OSTEOPOROSIS: ICD-10-CM

## 2019-03-18 DIAGNOSIS — Z13.820 SCREENING FOR OSTEOPOROSIS: ICD-10-CM

## 2019-03-18 PROCEDURE — 77080 DXA BONE DENSITY AXIAL: CPT

## 2019-03-18 PROCEDURE — 77080 DXA BONE DENSITY AXIAL: CPT | Performed by: RADIOLOGY

## 2019-03-26 ENCOUNTER — TELEPHONE (OUTPATIENT)
Dept: CARDIOLOGY | Facility: CLINIC | Age: 69
End: 2019-03-26

## 2019-03-26 NOTE — TELEPHONE ENCOUNTER
Patient needs cardiac clearance for back surgery on 4/17 with Dr. Hendricks at [facility].  TLI fusion of 4th and 5th vertebrae. He was last seen in our office on 7/3/2018.  They would like him to hold plavix and aspirin.    Please advise.

## 2019-07-08 ENCOUNTER — TRANSCRIBE ORDERS (OUTPATIENT)
Dept: ADMINISTRATIVE | Facility: HOSPITAL | Age: 69
End: 2019-07-08

## 2019-07-08 DIAGNOSIS — R10.811 RIGHT UPPER QUADRANT ABDOMINAL TENDERNESS WITHOUT REBOUND TENDERNESS: Primary | ICD-10-CM

## 2019-07-16 ENCOUNTER — OFFICE VISIT (OUTPATIENT)
Dept: CARDIOLOGY | Facility: CLINIC | Age: 69
End: 2019-07-16

## 2019-07-16 VITALS
HEIGHT: 70 IN | BODY MASS INDEX: 25.62 KG/M2 | DIASTOLIC BLOOD PRESSURE: 64 MMHG | SYSTOLIC BLOOD PRESSURE: 110 MMHG | HEART RATE: 56 BPM | WEIGHT: 179 LBS

## 2019-07-16 DIAGNOSIS — I25.10 CORONARY ARTERY DISEASE INVOLVING NATIVE CORONARY ARTERY OF NATIVE HEART WITHOUT ANGINA PECTORIS: Primary | ICD-10-CM

## 2019-07-16 DIAGNOSIS — I10 ESSENTIAL HYPERTENSION: ICD-10-CM

## 2019-07-16 DIAGNOSIS — E78.5 DYSLIPIDEMIA: ICD-10-CM

## 2019-07-16 PROCEDURE — 99214 OFFICE O/P EST MOD 30 MIN: CPT | Performed by: INTERNAL MEDICINE

## 2019-07-16 RX ORDER — INSULIN GLARGINE 100 [IU]/ML
20 INJECTION, SOLUTION SUBCUTANEOUS DAILY
COMMUNITY
End: 2019-12-16

## 2019-07-16 NOTE — PROGRESS NOTES
Encounter Date:07/16/2019        Patient ID: Alfredito Marcus is a 69 y.o. male.    PCP: Pastora Kulkarni MD     Chief Complaint: Coronary Artery Disease      PROBLEM LIST:  1. Coronary artery disease:  a. Unstable angina with abnormal Cardiolite stress test, June 2008.  b. [facility], Dr. Garcia, 06/09/2008: 99% stenosis of the LAD treated with placement of two overlapping Cypher LIZZETH along with balloon angioplasty of the first diagonal.  Rest of the vessels is free from significant disease and LV function was normal.  c. Cardiolite stress test, 03/23/2011: Mild apical ischemia. EF 64%.  d. [facility], Dr. Garcia, November 2011: Patent stents of the LAD, no significant disease, normal LV function.  e. [facility], 05/31/2016, Dr. Edouard: Non-flow-limiting CAD. Widely patent LAD stents. Mild (20%-30%) disease in the LAD and RCA. Normal EF.  2. Hypertension.  3. Dyslipidemia.  4. Osteoarthritis.  5. Gout.  6. History of gluten intolerance.  7. H/O degenerative disc disease.  8. Closed fracture of proximal end of left humerus.    History of Present Illness  Patient presents today for annual follow-up with a history of coronary artery disease and cardiac risk factors. Since last visit, he has been doing well overall from a cardiovascular standpoint. Denies chest pain, shortness of breath, leg swelling, palpitations, and syncope. Remains busy and active with limitations due to recent back surgery. He reports a recent increase of his pravastatin from 40 to 80 mg by his PCP. This was recently decreased back to 40 due to side effects.    Allergies   Allergen Reactions   • Iodine    • Shrimp (Diagnostic)          Current Outpatient Medications:   •  amLODIPine (NORVASC) 10 MG tablet, Take 10 mg by mouth daily., Disp: , Rfl:   •  aspirin  MG tablet, Take 325 mg by mouth every night., Disp: , Rfl:   •  carvedilol (COREG) 6.25 MG tablet, Take 6.25 mg by mouth 2 (two) times a day with meals., Disp: , Rfl:   •  cholecalciferol (VITAMIN D3)  1000 units tablet, Take 1,000 Units by mouth 2 (Two) Times a Day., Disp: , Rfl:   •  clopidogrel (PLAVIX) 75 MG tablet, Take 75 mg by mouth daily., Disp: , Rfl:   •  Coenzyme Q10 (COQ10 PO), Take 100 mg by mouth daily., Disp: , Rfl:   •  docusate sodium (COLACE) 100 MG capsule, Take 300 mg by mouth every night., Disp: , Rfl:   •  folic acid (FOLVITE) 1 MG tablet, Take 1 mg by mouth Daily., Disp: , Rfl:   •  insulin glargine (LANTUS) 100 UNIT/ML injection, Inject 20 Units under the skin into the appropriate area as directed Daily., Disp: , Rfl:   •  lansoprazole (PREVACID) 30 MG capsule, Take 30 mg by mouth daily., Disp: , Rfl:   •  latanoprost (XALATAN) 0.005 % ophthalmic solution, Administer 1 drop to the right eye every night., Disp: , Rfl:   •  loratadine (CLARITIN) 10 MG tablet, Take 10 mg by mouth Daily., Disp: , Rfl:   •  Omega-3-Acid Eth Est, Dietary, 1 G capsule, Take 1 capsule by mouth 2 (two) times a day., Disp: , Rfl:   •  pravastatin (PRAVACHOL) 40 MG tablet, Take 40 mg by mouth Every Night., Disp: , Rfl:   •  ranitidine (ZANTAC) 150 MG tablet, Take 150 mg by mouth every night., Disp: , Rfl:   •  tamsulosin (FLOMAX) 0.4 MG capsule 24 hr capsule, Take 1 capsule by mouth daily., Disp: , Rfl:   •  traMADol (ULTRAM) 50 MG tablet, Take 200 mg by mouth Daily., Disp: , Rfl:     The following portions of the patient's history were reviewed and updated as appropriate: allergies, current medications, past family history, past medical history, past social history, past surgical history and problem list.    ROS  Review of Systems   Constitution: Negative for chills, fatigue, fever, generalized weakness, weight gain and weight loss.   Cardiovascular: Negative for chest pain, claudication, dyspnea on exertion, leg swelling, orthopnea, palpitations, paroxysmal nocturnal dyspnea and syncope.   Respiratory: Negative for cough, shortness of breath, and wheezing.  HENT: Negative for ear pain, nosebleeds, and  "tinnitus.  Gastrointestinal: Negative for abdominal pain, constipation, diarrhea, nausea and vomiting.   Genitourinary: No urinary symptoms   Musculoskeletal: Negative for muscle cramps.  Neurological: Negative for dizziness, headaches, loss of balance, numbness, and symptoms of stroke.  Psychiatric: Normal mental status.     All other systems reviewed and are negative.    Objective:     /64 (BP Location: Left arm, Patient Position: Sitting)   Pulse 56   Ht 177.8 cm (70\")   Wt 81.2 kg (179 lb)   BMI 25.68 kg/m²        Physical Exam  Constitutional: Patient appears well-developed and well-nourished.   HENT: HEENT exam unremarkable.   Neck: Neck supple. No JVD present. No carotid bruits.   Cardiovascular: Normal rate, regular rhythm and normal heart sounds. No murmur heard.   2+ symmetric pulses.   Pulmonary/Chest: Breath sounds normal. Does not exhibit tenderness.   Abdominal: Abdomen benign.   Musculoskeletal: Does not exhibit edema.   Neurological: Neurological exam unremarkable.   Vitals reviewed.    Lab Review:   Lab Results   Component Value Date    GLUCOSE 139 (H) 12/11/2018    BUN 16 12/11/2018    CREATININE 0.99 12/11/2018    EGFRIFNONA 75 12/11/2018    BCR 16.2 12/11/2018    K 4.6 12/11/2018    CO2 28.7 12/11/2018    CALCIUM 9.5 12/11/2018    ALBUMIN 4.60 12/11/2018    AST 25 12/11/2018    ALT 22 12/11/2018     Lab Results   Component Value Date    WBC 7.16 12/11/2018    HGB 15.2 12/11/2018    HCT 43.9 12/11/2018    MCV 91.8 12/11/2018     12/11/2018        Procedures       Assessment:      Diagnosis Plan   1. Coronary artery disease involving native coronary artery of native heart without angina pectoris  Stable, continue current medications.   2. Essential hypertension  Well-controlled, continue current medications.   3. Dyslipidemia  Monitored by PCP. Continue pravastatin 40 mg.     Plan:   Increase routine aerobic exercise.  Obtain and review labs from PCP office to see why the dose of " pravastatin was increased and whether a higher dose is indicated.  Stable cardiac status.  Continue current medications.   FU in 12 MO, sooner as needed.  Thank you for allowing us to participate in the care of your patient.     Scribed for Grady Garcia MD by Hannah Butler. 7/16/2019  1:15 PM      I, Grady Garcia MD, personally performed the services described in this documentation as scribed by the above named individual in my presence, and it is both accurate and complete.  7/16/2019  1:33 PM        Please note that portions of this note may have been completed with a voice recognition program. Efforts were made to edit the dictations, but occasionally words are mistranscribed.

## 2019-07-17 ENCOUNTER — APPOINTMENT (OUTPATIENT)
Dept: CT IMAGING | Facility: HOSPITAL | Age: 69
End: 2019-07-17

## 2019-07-25 ENCOUNTER — HOSPITAL ENCOUNTER (OUTPATIENT)
Dept: CT IMAGING | Facility: HOSPITAL | Age: 69
Discharge: HOME | End: 2019-07-25
Admitting: INTERNAL MEDICINE

## 2019-07-25 DIAGNOSIS — R10.811 RIGHT UPPER QUADRANT ABDOMINAL TENDERNESS WITHOUT REBOUND TENDERNESS: ICD-10-CM

## 2019-07-25 PROCEDURE — 74176 CT ABD & PELVIS W/O CONTRAST: CPT

## 2019-07-25 PROCEDURE — 74176 CT ABD & PELVIS W/O CONTRAST: CPT | Performed by: RADIOLOGY

## 2019-08-06 ENCOUNTER — TELEPHONE (OUTPATIENT)
Dept: CARDIOLOGY | Facility: CLINIC | Age: 69
End: 2019-08-06

## 2019-08-06 NOTE — TELEPHONE ENCOUNTER
I have attempted to get labs from PCP on multiple occassions.  The patient will go to PCP for copies and mail them to our office for review.  Address provided.

## 2019-11-19 ENCOUNTER — TELEPHONE (OUTPATIENT)
Dept: CARDIOLOGY | Facility: CLINIC | Age: 69
End: 2019-11-19

## 2019-11-19 RX ORDER — ASPIRIN 81 MG/1
81 TABLET ORAL DAILY
Qty: 30 TABLET | Refills: 5 | Status: SHIPPED | OUTPATIENT
Start: 2019-11-19 | End: 2020-05-20

## 2019-11-19 NOTE — TELEPHONE ENCOUNTER
He can restart aspirin 81 mg daily.  It has been a long time since stands and the risk is quite low, has last cath was unremarkable and there is no need to restart Plavix.

## 2019-11-19 NOTE — TELEPHONE ENCOUNTER
[facility]. He had complications with bleeding and received 3 units of blood. Been off his asa and Plavix for three weeks now. Had catheter removed last week and was instructed by surgeon to restart aspirin and Plavix in 2 weeks. He wants to know your thoughts on being off aspirin and Plavix this long. Please advise.

## 2019-11-21 ENCOUNTER — TRANSCRIBE ORDERS (OUTPATIENT)
Dept: INFUSION THERAPY | Facility: HOSPITAL | Age: 69
End: 2019-11-21

## 2019-11-21 ENCOUNTER — HOSPITAL ENCOUNTER (OUTPATIENT)
Dept: INFUSION THERAPY | Facility: HOSPITAL | Age: 69
Setting detail: INFUSION SERIES
Discharge: HOME | End: 2019-11-21

## 2019-11-21 VITALS
RESPIRATION RATE: 20 BRPM | TEMPERATURE: 98.1 F | DIASTOLIC BLOOD PRESSURE: 62 MMHG | HEART RATE: 57 BPM | SYSTOLIC BLOOD PRESSURE: 109 MMHG

## 2019-11-21 DIAGNOSIS — D50.9 IRON DEFICIENCY ANEMIA, UNSPECIFIED IRON DEFICIENCY ANEMIA TYPE: Primary | ICD-10-CM

## 2019-11-21 DIAGNOSIS — D64.9 ACUTE ANEMIA: Primary | ICD-10-CM

## 2019-11-21 LAB
ABO GROUP BLD: NORMAL
BLD GP AB SCN SERPL QL: NEGATIVE
FERRITIN SERPL-MCNC: 27.47 NG/ML (ref 30–400)
HCT VFR BLD AUTO: 30.6 % (ref 37.5–51)
HGB BLD-MCNC: 9.4 G/DL (ref 13–17.7)
IRON 24H UR-MRATE: 253 MCG/DL (ref 59–158)
IRON SATN MFR SERPL: 60 % (ref 20–50)
RH BLD: POSITIVE
T&S EXPIRATION DATE: NORMAL
TIBC SERPL-MCNC: 422 MCG/DL (ref 298–536)
TRANSFERRIN SERPL-MCNC: 283 MG/DL (ref 200–360)

## 2019-11-21 PROCEDURE — 85018 HEMOGLOBIN: CPT | Performed by: INTERNAL MEDICINE

## 2019-11-21 PROCEDURE — 86920 COMPATIBILITY TEST SPIN: CPT

## 2019-11-21 PROCEDURE — 86901 BLOOD TYPING SEROLOGIC RH(D): CPT | Performed by: INTERNAL MEDICINE

## 2019-11-21 PROCEDURE — 96365 THER/PROPH/DIAG IV INF INIT: CPT

## 2019-11-21 PROCEDURE — 84466 ASSAY OF TRANSFERRIN: CPT | Performed by: INTERNAL MEDICINE

## 2019-11-21 PROCEDURE — 86901 BLOOD TYPING SEROLOGIC RH(D): CPT

## 2019-11-21 PROCEDURE — 86850 RBC ANTIBODY SCREEN: CPT | Performed by: INTERNAL MEDICINE

## 2019-11-21 PROCEDURE — 83540 ASSAY OF IRON: CPT | Performed by: INTERNAL MEDICINE

## 2019-11-21 PROCEDURE — 85014 HEMATOCRIT: CPT | Performed by: INTERNAL MEDICINE

## 2019-11-21 PROCEDURE — 82728 ASSAY OF FERRITIN: CPT | Performed by: INTERNAL MEDICINE

## 2019-11-21 PROCEDURE — 25010000002 FERRIC CARBOXYMALTOSE 750 MG/15ML SOLUTION 15 ML VIAL: Performed by: INTERNAL MEDICINE

## 2019-11-21 PROCEDURE — 86900 BLOOD TYPING SEROLOGIC ABO: CPT | Performed by: INTERNAL MEDICINE

## 2019-11-21 PROCEDURE — 86900 BLOOD TYPING SEROLOGIC ABO: CPT

## 2019-11-21 RX ADMIN — FERRIC CARBOXYMALTOSE INJECTION 750 MG: 50 INJECTION, SOLUTION INTRAVENOUS at 17:44

## 2019-11-21 NOTE — PATIENT INSTRUCTIONS
Ferric carboxymaltose injection  What is this medicine?  FERRIC CARBOXYMALTOSE (ferr-ik car-box-ee-mol-toes) is an iron complex. Iron is used to make healthy red blood cells, which carry oxygen and nutrients throughout the body. This medicine is used to treat anemia in people with chronic kidney disease or people who cannot take iron by mouth.  This medicine may be used for other purposes; ask your health care provider or pharmacist if you have questions.  COMMON BRAND NAME(S): Injectafer  What should I tell my health care provider before I take this medicine?  They need to know if you have any of these conditions:  -high levels of iron in the blood  -liver disease  -an unusual or allergic reaction to iron, other medicines, foods, dyes, or preservatives  -pregnant or trying to get pregnant  -breast-feeding  How should I use this medicine?  This medicine is for infusion into a vein. It is given by a health care professional in a hospital or clinic setting.  Talk to your pediatrician regarding the use of this medicine in children. Special care may be needed.  Overdosage: If you think you have taken too much of this medicine contact a poison control center or emergency room at once.  NOTE: This medicine is only for you. Do not share this medicine with others.  What if I miss a dose?  It is important not to miss your dose. Call your doctor or health care professional if you are unable to keep an appointment.  What may interact with this medicine?  Do not take this medicine with any of the following medications:  -deferoxamine  -dimercaprol  -other iron products  This list may not describe all possible interactions. Give your health care provider a list of all the medicines, herbs, non-prescription drugs, or dietary supplements you use. Also tell them if you smoke, drink alcohol, or use illegal drugs. Some items may interact with your medicine.  What should I watch for while using this medicine?  Visit your doctor or  health care professional regularly. Tell your doctor if your symptoms do not start to get better or if they get worse. You may need blood work done while you are taking this medicine.  You may need to follow a special diet. Talk to your doctor. Foods that contain iron include: whole grains/cereals, dried fruits, beans, or peas, leafy green vegetables, and organ meats (liver, kidney).  What side effects may I notice from receiving this medicine?  Side effects that you should report to your doctor or health care professional as soon as possible:  -allergic reactions like skin rash, itching or hives, swelling of the face, lips, or tongue  -dizziness  -facial flushing  Side effects that usually do not require medical attention (report to your doctor or health care professional if they continue or are bothersome):  -changes in taste  -constipation  -headache  -nausea, vomiting  -pain, redness, or irritation at site where injected  This list may not describe all possible side effects. Call your doctor for medical advice about side effects. You may report side effects to FDA at 4-338-FDA-2459.  Where should I keep my medicine?  This drug is given in a hospital or clinic and will not be stored at home.  NOTE: This sheet is a summary. It may not cover all possible information. If you have questions about this medicine, talk to your doctor, pharmacist, or health care provider.  © 2019 [employer] (2018-02-01 09:40:29)

## 2019-11-22 LAB
ABO + RH BLD: NORMAL
ABO + RH BLD: NORMAL
BH BB BLOOD EXPIRATION DATE: NORMAL
BH BB BLOOD EXPIRATION DATE: NORMAL
BH BB BLOOD TYPE BARCODE: 5100
BH BB BLOOD TYPE BARCODE: 5100
BH BB DISPENSE STATUS: NORMAL
BH BB DISPENSE STATUS: NORMAL
BH BB PRODUCT CODE: NORMAL
BH BB PRODUCT CODE: NORMAL
BH BB UNIT NUMBER: NORMAL
BH BB UNIT NUMBER: NORMAL
CROSSMATCH INTERPRETATION: NORMAL
CROSSMATCH INTERPRETATION: NORMAL
UNIT  ABO: NORMAL
UNIT  ABO: NORMAL
UNIT  RH: NORMAL
UNIT  RH: NORMAL

## 2019-12-09 ENCOUNTER — TELEPHONE (OUTPATIENT)
Dept: CARDIOLOGY | Facility: CLINIC | Age: 69
End: 2019-12-09

## 2019-12-09 NOTE — TELEPHONE ENCOUNTER
Spoke with patient's wife about the patient being in the hospital recently.    [facility] and [facility]. She states that he ended up with receiving some units of blood because of hemorrhage, and he was in ICU. While in the ICU, the patient had some arrhythmia issues and almost coded twice.     She would like to get an appointment for the patient with [facility].          [facility] appt: 12/16/19 @ 1300.    [facility].

## 2019-12-16 ENCOUNTER — OFFICE VISIT (OUTPATIENT)
Dept: CARDIOLOGY | Facility: CLINIC | Age: 69
End: 2019-12-16

## 2019-12-16 VITALS
WEIGHT: 174 LBS | SYSTOLIC BLOOD PRESSURE: 122 MMHG | HEIGHT: 70 IN | HEART RATE: 50 BPM | DIASTOLIC BLOOD PRESSURE: 64 MMHG | OXYGEN SATURATION: 98 % | BODY MASS INDEX: 24.91 KG/M2

## 2019-12-16 DIAGNOSIS — I25.10 CORONARY ARTERY DISEASE INVOLVING NATIVE CORONARY ARTERY OF NATIVE HEART WITHOUT ANGINA PECTORIS: Primary | ICD-10-CM

## 2019-12-16 DIAGNOSIS — E78.5 DYSLIPIDEMIA: ICD-10-CM

## 2019-12-16 DIAGNOSIS — I10 ESSENTIAL HYPERTENSION: ICD-10-CM

## 2019-12-16 PROBLEM — K21.9 GERD (GASTROESOPHAGEAL REFLUX DISEASE): Status: ACTIVE | Noted: 2019-12-16

## 2019-12-16 PROCEDURE — 99214 OFFICE O/P EST MOD 30 MIN: CPT | Performed by: PHYSICIAN ASSISTANT

## 2019-12-16 RX ORDER — SITAGLIPTIN 100 MG/1
100 TABLET, FILM COATED ORAL DAILY
COMMUNITY
Start: 2019-12-03

## 2019-12-16 NOTE — PROGRESS NOTES
Encounter Date:12/16/2019      Patient ID: Alfredito Marcus is a 69 y.o. male.    Pastora Kulkarni MD    Chief Complaint: Coronary Artery Disease      PROBLEM LIST:  Patient Active Problem List    Diagnosis    • CAD (coronary artery disease)                a. Unstable angina with abnormal Cardiolite stress test, June 2008.  b. Cardiac cath by Dr. Garcia, 06/09/2008, showed 99% stenosis of the LAD treated with placement of two overlapping Cypher drug-coated stents along with balloon angioplasty of the first diagonal.  Rest of the vessels is free from significant disease and LV function was normal.  c. Cardiolite stress test, 03/23/2011, showed mild apical ischemia with ejection fraction 64%.  d. Cardiac cath by Dr. Garcia, November 2011, showed patent stents of the LAD, no significant disease, normal LV function.  e. [facility] 5-31-16: [place]: Non- flow-limiting coronary artery disease:.Widely patent LAD stents. Mild (20%-30%) disease in the LAD and RCA. . Normal left ventricular function.      • Hypertension                   • Dyslipidemia                   • GERD (gastroesophageal reflux disease)    • Iron deficiency anemia, unspecified    • Osteoarthritis    • Gout    • History of gluten intolerance    • H/O degenerative disc disease      Note Last Updated: 2/6/2017     Degenerative disc disease of the cervical and lumbar spine/chronic back pain     • Closed fracture of proximal end of left humerus            History of Present Illness  Patient presents today for follow-up with a history of coronary disease, hypertension, dyslipidemia.  He presents today for an early visit after complications to TURP surgery at the [facility].  Apparently his procedure was complicated by severe bleeding down to a hemoglobin of approximately 7 requiring 3 units of packed red blood cells.  He had multiple rapid responses due to low blood pressure which at 1 point reached 50 mmHg systolic.  There was no loss of consciousness, no  "cardiac failure, no CPR or defibrillation required.  He recovered rapidly with volume replacement.  He reports he had chest pressure during this event which resolved.  He does report a single, brief episode of chest discomfort upon arriving home which did not require the use of sublingual nitroglycerin.  His blood pressures have remained in the 120s systolic with heart rates generally in the 50 to 60s at rest and 70s with exertion.  He reports he was not able to tolerate pravastatin 80 mg daily due to myalgias but is tolerating 40 mg without difficulty.  His most recent lipid panel was at the [facility] several months ago and reported to be \"okay\".  He is tolerant with his current medical regimen reports no significant side effects.    Allergies   Allergen Reactions   • Iodine    • Shrimp (Diagnostic)          Current Outpatient Medications:   •  amLODIPine (NORVASC) 10 MG tablet, Take 10 mg by mouth daily., Disp: , Rfl:   •  aspirin 81 MG EC tablet, Take 1 tablet by mouth Daily., Disp: 30 tablet, Rfl: 5  •  carvedilol (COREG) 6.25 MG tablet, Take 6.25 mg by mouth 2 (two) times a day with meals., Disp: , Rfl:   •  cholecalciferol (VITAMIN D3) 1000 units tablet, Take 1,000 Units by mouth 2 (Two) Times a Day., Disp: , Rfl:   •  Coenzyme Q10 (COQ10 PO), Take 100 mg by mouth daily., Disp: , Rfl:   •  docusate sodium (COLACE) 100 MG capsule, Take 300 mg by mouth every night., Disp: , Rfl:   •  folic acid (FOLVITE) 1 MG tablet, Take 1 mg by mouth Daily., Disp: , Rfl:   •  JANUVIA 100 MG tablet, Take 100 mg by mouth Daily., Disp: , Rfl:   •  lansoprazole (PREVACID) 30 MG capsule, Take 30 mg by mouth daily., Disp: , Rfl:   •  latanoprost (XALATAN) 0.005 % ophthalmic solution, Administer 1 drop to the right eye every night., Disp: , Rfl:   •  loratadine (CLARITIN) 10 MG tablet, Take 10 mg by mouth Daily., Disp: , Rfl:   •  Omega-3-Acid Eth Est, Dietary, 1 G capsule, Take 1 capsule by mouth 2 (two) times a day., Disp: , Rfl: " "  •  pravastatin (PRAVACHOL) 40 MG tablet, Take 40 mg by mouth Every Night., Disp: , Rfl:   •  ranitidine (ZANTAC) 150 MG tablet, Take 150 mg by mouth every night., Disp: , Rfl:   •  tamsulosin (FLOMAX) 0.4 MG capsule 24 hr capsule, Take 1 capsule by mouth daily., Disp: , Rfl:   •  traMADol (ULTRAM) 50 MG tablet, Take 200 mg by mouth Daily., Disp: , Rfl:     The following portions of the patient's history were reviewed and updated as appropriate: allergies, current medications, past family history, past medical history, past social history, past surgical history and problem list.    Review of Systems   Constitution: Negative for diaphoresis, malaise/fatigue and weight gain.   Cardiovascular: Negative for chest pain, claudication, dyspnea on exertion, irregular heartbeat, leg swelling, orthopnea, palpitations, paroxysmal nocturnal dyspnea and syncope.   Respiratory: Negative for cough, shortness of breath, sleep disturbances due to breathing and wheezing.    Hematologic/Lymphatic: Negative for bleeding problem.   Musculoskeletal: Negative for muscle cramps, muscle weakness and myalgias.   Gastrointestinal: Negative for heartburn.   Neurological: Negative for weakness.     .    Objective:     /64 (BP Location: Left arm, Patient Position: Sitting)   Pulse 50   Ht 177.8 cm (70\")   Wt 78.9 kg (174 lb)   SpO2 98%   BMI 24.97 kg/m²    Body mass index is 24.97 kg/m².     Physical Exam   Constitutional: He is oriented to person, place, and time. He appears well-developed and well-nourished.   Cardiovascular: Normal rate, regular rhythm, normal heart sounds and intact distal pulses. Exam reveals no gallop and no friction rub.   No murmur heard.  Pulmonary/Chest: Effort normal and breath sounds normal. No respiratory distress. He has no wheezes. He has no rales. He exhibits no tenderness.   Bases clear   Musculoskeletal: Normal range of motion. He exhibits no edema.   Neurological: He is alert and oriented to " person, place, and time.       Lab Review:       Procedures             Assessment:      Diagnosis Plan   1. Coronary artery disease involving native coronary artery of native heart without angina pectoris   He is currently anginal free.  I think the chest discomfort he experienced while severel anemic is well explained.  He is ambulating without difficulty and resuming normal activities without limitation.  Given that he is currently anginal free I would like to let them heal from his TURP surgery prior to pursuing any ischemic study.  I do not see a need at this time to resume Plavix or to take aspirin at a dose greater than 81 mg daily.   2. Essential hypertension  Well managed,  continue current therapy.     3. Dyslipidemia   on statin therapy.  He will bring his lipid panel with him at our next visit for our review.     Plan:         Stable cardiac status.  Continue current medications.   in 3 months, sooner as needed.  Thank you for allowing us to participate in the care of your patient.         Electronically signed by NILAY Yu, 12/16/19, 1:20 PM.          Please note that portions of this note may have been completed with a voice recognition program. Efforts were made to edit the dictations, but occasionally words are mistr

## 2019-12-30 ENCOUNTER — HOSPITAL ENCOUNTER (OUTPATIENT)
Dept: GENERAL RADIOLOGY | Facility: HOSPITAL | Age: 69
Discharge: HOME | End: 2019-12-30
Admitting: INTERNAL MEDICINE

## 2019-12-30 ENCOUNTER — TRANSCRIBE ORDERS (OUTPATIENT)
Dept: ADMINISTRATIVE | Facility: HOSPITAL | Age: 69
End: 2019-12-30

## 2019-12-30 DIAGNOSIS — M25.551 RIGHT HIP PAIN: Primary | ICD-10-CM

## 2019-12-30 DIAGNOSIS — M25.551 RIGHT HIP PAIN: ICD-10-CM

## 2019-12-30 PROCEDURE — 73502 X-RAY EXAM HIP UNI 2-3 VIEWS: CPT

## 2019-12-30 PROCEDURE — 73502 X-RAY EXAM HIP UNI 2-3 VIEWS: CPT | Performed by: RADIOLOGY

## 2019-12-31 ENCOUNTER — TRANSCRIBE ORDERS (OUTPATIENT)
Dept: ADMINISTRATIVE | Facility: HOSPITAL | Age: 69
End: 2019-12-31

## 2019-12-31 DIAGNOSIS — M25.551 BILATERAL HIP PAIN: Primary | ICD-10-CM

## 2019-12-31 DIAGNOSIS — M25.552 BILATERAL HIP PAIN: Primary | ICD-10-CM

## 2020-01-10 ENCOUNTER — HOSPITAL ENCOUNTER (OUTPATIENT)
Dept: NUCLEAR MEDICINE | Facility: HOSPITAL | Age: 70
Discharge: HOME | End: 2020-01-10

## 2020-01-10 DIAGNOSIS — M25.551 BILATERAL HIP PAIN: ICD-10-CM

## 2020-01-10 DIAGNOSIS — M25.552 BILATERAL HIP PAIN: ICD-10-CM

## 2020-01-10 PROCEDURE — 78315 BONE IMAGING 3 PHASE: CPT

## 2020-01-10 PROCEDURE — A9561 TC99M OXIDRONATE: HCPCS | Performed by: INTERNAL MEDICINE

## 2020-01-10 PROCEDURE — 0 TECHNETIUM OXIDRONATE KIT: Performed by: INTERNAL MEDICINE

## 2020-01-10 PROCEDURE — 78315 BONE IMAGING 3 PHASE: CPT | Performed by: RADIOLOGY

## 2020-01-10 RX ADMIN — TECHNETIUM TC 99M OXIDRONATE 1 DOSE: 3.15 INJECTION, POWDER, LYOPHILIZED, FOR SOLUTION INTRAVENOUS at 10:20

## 2020-03-02 ENCOUNTER — TRANSCRIBE ORDERS (OUTPATIENT)
Dept: ADMINISTRATIVE | Facility: HOSPITAL | Age: 70
End: 2020-03-02

## 2020-03-02 DIAGNOSIS — M54.50 LOW BACK PAIN, UNSPECIFIED BACK PAIN LATERALITY, UNSPECIFIED CHRONICITY, UNSPECIFIED WHETHER SCIATICA PRESENT: Primary | ICD-10-CM

## 2020-04-06 ENCOUNTER — APPOINTMENT (OUTPATIENT)
Dept: MRI IMAGING | Facility: HOSPITAL | Age: 70
End: 2020-04-06

## 2020-05-20 ENCOUNTER — OFFICE VISIT (OUTPATIENT)
Dept: CARDIOLOGY | Facility: CLINIC | Age: 70
End: 2020-05-20

## 2020-05-20 VITALS
HEIGHT: 70 IN | WEIGHT: 170 LBS | SYSTOLIC BLOOD PRESSURE: 130 MMHG | HEART RATE: 53 BPM | DIASTOLIC BLOOD PRESSURE: 76 MMHG | BODY MASS INDEX: 24.34 KG/M2

## 2020-05-20 DIAGNOSIS — E78.5 DYSLIPIDEMIA: ICD-10-CM

## 2020-05-20 DIAGNOSIS — I25.118 CORONARY ARTERY DISEASE OF NATIVE ARTERY OF NATIVE HEART WITH STABLE ANGINA PECTORIS (HCC): Primary | ICD-10-CM

## 2020-05-20 DIAGNOSIS — I10 ESSENTIAL HYPERTENSION: ICD-10-CM

## 2020-05-20 PROCEDURE — 99441 PR PHYS/QHP TELEPHONE EVALUATION 5-10 MIN: CPT | Performed by: INTERNAL MEDICINE

## 2020-05-20 RX ORDER — OLMESARTAN MEDOXOMIL 40 MG/1
40 TABLET ORAL DAILY
COMMUNITY

## 2020-05-20 RX ORDER — ASPIRIN 325 MG
325 TABLET ORAL DAILY
COMMUNITY
End: 2022-08-23 | Stop reason: DRUGHIGH

## 2020-05-20 NOTE — PROGRESS NOTES
[facility] Cardiology    Encounter Date:2020    Patient ID: Alfredito Marcus is a 70 y.o. male.  : 1950     PCP: Pastora Kulkarni MD       Chief Complaint: Coronary Artery Disease      PROBLEM LIST:  1. Coronary artery disease:  a. Unstable angina with abnormal Cardiolite stress test, 2008.  b. [facility], 2008, Dr. Garcia: 99% LAD stenosis s/p 2 overlapping Cypher LIZZETH with balloon angioplasty of D1. Rest of the vessels is free from significant disease and LV function was normal.  c. Cardiolite stress test, 2011: Mild apical ischemia. EF 64%.  d. [facility], 2011, Dr. Garcia: Patent stents of the LAD, no significant disease. Normal EF.  e. [facility], 2016, Dr. Edouard: Non-flow-limiting CAD. Widely patent LAD stents. Mild (20%-30%) disease in the LAD and RCA. Normal EF.  2. Hypertension.  3. Dyslipidemia.  4. Osteoarthritis.  5. Gout.  6. History of gluten intolerance.  7. H/O degenerative disc disease.  8. Closed fracture of proximal end of left humerus.    History of Present Illness  Patient has a telephone visit today for follow-up with a history of coronary artery disease and cardiac risk factors. Since last visit, he has done very well from cardiac standpoint.  Remains active and busy with household chores.  No current significant chest pain shortness of breath edema palpitations dizziness lightheadedness or syncope.  States that he only had one episode of chest discomfort a couple of weeks ago which finally resolved with nitroglycerin without subsequent recurrence.  Did not have any other questions.  Did not need any prescription refills.    Allergies   Allergen Reactions   • Iodine    • Shrimp (Diagnostic)          Current Outpatient Medications:   •  amLODIPine (NORVASC) 10 MG tablet, Take 10 mg by mouth daily., Disp: , Rfl:   •  aspirin 325 MG tablet, Take 325 mg by mouth Daily., Disp: , Rfl:   •  cholecalciferol (VITAMIN D3) 1000 units tablet, Take 1,000 Units  by mouth 2 (Two) Times a Day., Disp: , Rfl:   •  Coenzyme Q10 (COQ10 PO), Take 100 mg by mouth daily., Disp: , Rfl:   •  docusate sodium (COLACE) 100 MG capsule, Take 300 mg by mouth every night., Disp: , Rfl:   •  folic acid (FOLVITE) 1 MG tablet, Take 1 mg by mouth Daily., Disp: , Rfl:   •  JANUVIA 100 MG tablet, Take 100 mg by mouth Daily., Disp: , Rfl:   •  lansoprazole (PREVACID) 30 MG capsule, Take 30 mg by mouth daily., Disp: , Rfl:   •  latanoprost (XALATAN) 0.005 % ophthalmic solution, Administer 1 drop to the right eye every night., Disp: , Rfl:   •  loratadine (CLARITIN) 10 MG tablet, Take 10 mg by mouth Daily., Disp: , Rfl:   •  olmesartan (BENICAR) 40 MG tablet, Take 40 mg by mouth Daily., Disp: , Rfl:   •  Omega-3-Acid Eth Est, Dietary, 1 G capsule, Take 1 capsule by mouth 2 (two) times a day., Disp: , Rfl:   •  pravastatin (PRAVACHOL) 40 MG tablet, Take 40 mg by mouth Every Night., Disp: , Rfl:   •  ranitidine (ZANTAC) 150 MG tablet, Take 150 mg by mouth every night., Disp: , Rfl:   •  traMADol (ULTRAM) 50 MG tablet, Take 200 mg by mouth Daily., Disp: , Rfl:     The following portions of the patient's history were reviewed and updated as appropriate: allergies, current medications, past family history, past medical history, past social history, past surgical history and problem list.    ROS  Review of Systems   Constitution: Negative for chills, fever, fatigue, generalized weakness.   Cardiovascular: Pertinent positives in HPI, otherwise negative.  Respiratory: Pertinent positives in HPI, otherwise negative.  HENT: Negative for ear pain, nosebleeds, and tinnitus.  Gastrointestinal: Negative for abdominal pain, constipation, diarrhea, nausea and vomiting.   Genitourinary: No urinary symptoms.  Musculoskeletal: Negative for muscle cramps.  Neurological: Negative for headaches, loss of balance, numbness, and symptoms of stroke.  Psychiatric: Normal mental status.     All other systems reviewed and are  "negative.    Objective:     /76 (BP Location: Left arm, Patient Position: Sitting)   Pulse 53   Ht 177.8 cm (70\")   Wt 77.1 kg (170 lb)   BMI 24.39 kg/m²        Physical Exam  No physical exam performed secondary to telephone visit.   Vitals reviewed.    Lab Review:     Normal CBC and CMP, 07/09/2019    Last lipid panel, 05/28/2019:      HDL 33  LDL 61      Procedures       Assessment:      Diagnosis Plan   1. Coronary artery disease of native artery of native heart with stable angina pectoris (CMS/[facility])   sttable, continue current medications, I have angina becomes more frequent notify us and we can further adjust medications..   2. Essential hypertension   controlled, continue current treatment.   3. Dyslipidemia   managed by PCP, continue current statin therapy.     Plan:   Stable from cardiac standpoint.  Continue current medications.   FU in 6 MO, sooner as needed.  Thank you for allowing us to participate in the care of your patient.     This patient has consented to a telehealth visit via phone. The visit was scheduled as a phone visit to comply with patient safety concerns in accordance with CDC recommendations.  All vitals recorded within this visit are reported by the patient.  I spent 15 minutes in total including but not limited to the 8 minutes spent in direct conversation with this patient.       Grady Garcia MD, FACC, [facility]      Please note that portions of this note may have been completed with a voice recognition program. Efforts were made to edit the dictations, but occasionally words are mistranscribed.      "

## 2020-06-11 ENCOUNTER — TRANSCRIBE ORDERS (OUTPATIENT)
Dept: ADMINISTRATIVE | Facility: HOSPITAL | Age: 70
End: 2020-06-11

## 2020-06-11 ENCOUNTER — HOSPITAL ENCOUNTER (OUTPATIENT)
Dept: GENERAL RADIOLOGY | Facility: HOSPITAL | Age: 70
Discharge: HOME | End: 2020-06-11
Admitting: INTERNAL MEDICINE

## 2020-06-11 ENCOUNTER — HOSPITAL ENCOUNTER (OUTPATIENT)
Dept: GENERAL RADIOLOGY | Facility: HOSPITAL | Age: 70
Discharge: HOME | End: 2020-06-11

## 2020-06-11 DIAGNOSIS — M25.571 RIGHT ANKLE PAIN, UNSPECIFIED CHRONICITY: Primary | ICD-10-CM

## 2020-06-11 DIAGNOSIS — M25.571 RIGHT ANKLE PAIN, UNSPECIFIED CHRONICITY: ICD-10-CM

## 2020-06-11 PROCEDURE — 73630 X-RAY EXAM OF FOOT: CPT

## 2020-06-11 PROCEDURE — 73600 X-RAY EXAM OF ANKLE: CPT | Performed by: RADIOLOGY

## 2020-06-11 PROCEDURE — 73630 X-RAY EXAM OF FOOT: CPT | Performed by: RADIOLOGY

## 2020-06-11 PROCEDURE — 73600 X-RAY EXAM OF ANKLE: CPT

## 2020-06-12 ENCOUNTER — TRANSCRIBE ORDERS (OUTPATIENT)
Dept: ADMINISTRATIVE | Facility: HOSPITAL | Age: 70
End: 2020-06-12

## 2020-06-22 ENCOUNTER — TRANSCRIBE ORDERS (OUTPATIENT)
Dept: ADMINISTRATIVE | Facility: HOSPITAL | Age: 70
End: 2020-06-22

## 2020-06-22 ENCOUNTER — HOSPITAL ENCOUNTER (OUTPATIENT)
Dept: GENERAL RADIOLOGY | Facility: HOSPITAL | Age: 70
Discharge: HOME | End: 2020-06-22

## 2020-06-22 ENCOUNTER — HOSPITAL ENCOUNTER (OUTPATIENT)
Dept: MRI IMAGING | Facility: HOSPITAL | Age: 70
Discharge: HOME | End: 2020-06-22
Admitting: NEUROLOGICAL SURGERY

## 2020-06-22 DIAGNOSIS — M54.50 LOW BACK PAIN, UNSPECIFIED BACK PAIN LATERALITY, UNSPECIFIED CHRONICITY, UNSPECIFIED WHETHER SCIATICA PRESENT: ICD-10-CM

## 2020-06-22 DIAGNOSIS — M54.50 LOW BACK PAIN, UNSPECIFIED BACK PAIN LATERALITY, UNSPECIFIED CHRONICITY, UNSPECIFIED WHETHER SCIATICA PRESENT: Primary | ICD-10-CM

## 2020-06-22 PROCEDURE — 72100 X-RAY EXAM L-S SPINE 2/3 VWS: CPT

## 2020-06-22 PROCEDURE — 72148 MRI LUMBAR SPINE W/O DYE: CPT | Performed by: RADIOLOGY

## 2020-06-22 PROCEDURE — 72100 X-RAY EXAM L-S SPINE 2/3 VWS: CPT | Performed by: RADIOLOGY

## 2020-06-22 PROCEDURE — 72148 MRI LUMBAR SPINE W/O DYE: CPT

## 2020-06-23 ENCOUNTER — TRANSCRIBE ORDERS (OUTPATIENT)
Dept: ADMINISTRATIVE | Facility: HOSPITAL | Age: 70
End: 2020-06-23

## 2020-06-23 DIAGNOSIS — M25.562 ACUTE PAIN OF LEFT KNEE: Primary | ICD-10-CM

## 2020-06-30 ENCOUNTER — APPOINTMENT (OUTPATIENT)
Dept: MRI IMAGING | Facility: HOSPITAL | Age: 70
End: 2020-06-30

## 2020-07-09 ENCOUNTER — HOSPITAL ENCOUNTER (OUTPATIENT)
Dept: MRI IMAGING | Facility: HOSPITAL | Age: 70
Discharge: HOME | End: 2020-07-09
Admitting: ORTHOPAEDIC SURGERY

## 2020-07-09 DIAGNOSIS — M25.562 ACUTE PAIN OF LEFT KNEE: ICD-10-CM

## 2020-07-09 PROCEDURE — 73721 MRI JNT OF LWR EXTRE W/O DYE: CPT

## 2020-07-09 PROCEDURE — 73721 MRI JNT OF LWR EXTRE W/O DYE: CPT | Performed by: RADIOLOGY

## 2020-12-01 ENCOUNTER — OFFICE VISIT (OUTPATIENT)
Dept: CARDIOLOGY | Facility: CLINIC | Age: 70
End: 2020-12-01

## 2020-12-01 VITALS
HEIGHT: 70 IN | WEIGHT: 170 LBS | HEART RATE: 60 BPM | DIASTOLIC BLOOD PRESSURE: 60 MMHG | SYSTOLIC BLOOD PRESSURE: 104 MMHG | BODY MASS INDEX: 24.34 KG/M2 | OXYGEN SATURATION: 97 %

## 2020-12-01 DIAGNOSIS — I25.10 CORONARY ARTERY DISEASE INVOLVING NATIVE CORONARY ARTERY OF NATIVE HEART WITHOUT ANGINA PECTORIS: Primary | ICD-10-CM

## 2020-12-01 DIAGNOSIS — R09.89 BILATERAL CAROTID BRUITS: ICD-10-CM

## 2020-12-01 DIAGNOSIS — E78.5 DYSLIPIDEMIA: ICD-10-CM

## 2020-12-01 DIAGNOSIS — I10 ESSENTIAL HYPERTENSION: ICD-10-CM

## 2020-12-01 PROCEDURE — 99214 OFFICE O/P EST MOD 30 MIN: CPT | Performed by: INTERNAL MEDICINE

## 2020-12-01 NOTE — PROGRESS NOTES
[facility] Cardiology    Encounter Date: 2020    Patient ID: Alfredito Marcus is a 70 y.o. male.  : 1950     PCP: Pastora Kulkarni MD       Chief Complaint: Coronary artery disease of native artery of native heart wit      PROBLEM LIST:  1. Coronary artery disease:  a. Unstable angina with abnormal Cardiolite stress test, 2008.  b. [facility], 2008, Dr. Garcia: 99% LAD stenosis s/p 2 overlapping Cypher LIZZETH with balloon angioplasty of D1. Rest of the vessels is free from significant disease and LV function was normal.  c. Cardiolite stress test, 2011: Mild apical ischemia. EF 64%.  d. [facility], 2011, Dr. Garcia: Patent stents of the LAD, no significant disease. Normal EF.  e. [facility], 2016, Dr. Edouard: Non-flow-limiting CAD. Widely patent LAD stents. Mild (20%-30%) disease in the LAD and RCA. Normal EF.  2. Hypertension.  3. Dyslipidemia.  4. Osteoarthritis.  5. Gout.  6. History of gluten intolerance.  7. H/O degenerative disc disease.  8. Closed fracture of proximal end of left humerus.    History of Present Illness  Patient presents today for a 6 month follow-up with a history of coronary artery disease and cardiac risk factors. Since last visit, he has been feeling well overall from a cardiovascular standpoint. He has been staying busy and active by working on projects around the house. He has had a few episodes of pressure in his neck when he exerts himself for an extended period of time. The pressure improves with rest. He has never checked his blood pressure during these episodes. Patient denies chest pain, shortness of breath, palpitations, edema, dizziness, and syncope.       Allergies   Allergen Reactions   • Iodine    • Shrimp (Diagnostic)          Current Outpatient Medications:   •  amLODIPine (NORVASC) 10 MG tablet, Take 10 mg by mouth daily., Disp: , Rfl:   •  aspirin 325 MG tablet, Take 325 mg by mouth Daily., Disp: , Rfl:   •  cholecalciferol (VITAMIN  D3) 1000 units tablet, Take 1,000 Units by mouth 2 (Two) Times a Day., Disp: , Rfl:   •  Coenzyme Q10 (COQ10 PO), Take 100 mg by mouth daily., Disp: , Rfl:   •  docusate sodium (COLACE) 100 MG capsule, Take 300 mg by mouth every night., Disp: , Rfl:   •  folic acid (FOLVITE) 1 MG tablet, Take 1 mg by mouth Daily., Disp: , Rfl:   •  JANUVIA 100 MG tablet, Take 100 mg by mouth Daily., Disp: , Rfl:   •  lansoprazole (PREVACID) 30 MG capsule, Take 30 mg by mouth daily., Disp: , Rfl:   •  latanoprost (XALATAN) 0.005 % ophthalmic solution, Administer 1 drop to the right eye every night., Disp: , Rfl:   •  loratadine (CLARITIN) 10 MG tablet, Take 10 mg by mouth Daily., Disp: , Rfl:   •  olmesartan (BENICAR) 40 MG tablet, Take 40 mg by mouth Daily., Disp: , Rfl:   •  Omega-3-Acid Eth Est, Dietary, 1 G capsule, Take 1 capsule by mouth 2 (two) times a day., Disp: , Rfl:   •  pravastatin (PRAVACHOL) 40 MG tablet, Take 40 mg by mouth Every Night., Disp: , Rfl:   •  ranitidine (ZANTAC) 150 MG tablet, Take 150 mg by mouth every night., Disp: , Rfl:   •  traMADol (ULTRAM) 50 MG tablet, Take 200 mg by mouth Daily., Disp: , Rfl:     The following portions of the patient's history were reviewed and updated as appropriate: allergies, current medications, past family history, past medical history, past social history, past surgical history and problem list.    ROS  Review of Systems   Constitution: Negative for chills, fever, fatigue, generalized weakness.   Cardiovascular: Negative for chest pain, dyspnea on exertion, leg swelling, palpitations, orthopnea, and syncope.   Respiratory: Negative for cough, shortness of breath, and wheezing.  HENT: Negative for ear pain, nosebleeds, and tinnitus.  Gastrointestinal: Negative for abdominal pain, constipation, diarrhea, nausea and vomiting.   Genitourinary: No urinary symptoms.  Musculoskeletal: Negative for muscle cramps.  Neurological: Negative for dizziness, headaches, loss of balance,  "numbness, and symptoms of stroke.  Psychiatric: Normal mental status.     All other systems reviewed and are negative.        Objective:     /60 (BP Location: Left arm, Patient Position: Sitting)   Pulse 60   Ht 177.8 cm (70\")   Wt 77.1 kg (170 lb)   SpO2 97%   BMI 24.39 kg/m²      Physical Exam  Constitutional: Patient appears well-developed and well-nourished.   HENT: HEENT exam unremarkable.   Neck: Neck supple. No JVD present. Faint bilateral carotid bruits.   Cardiovascular: Normal rate, regular rhythm and normal heart sounds. No murmur heard.   2+ symmetric pulses.   Pulmonary/Chest: Breath sounds normal. Does not exhibit tenderness.   Abdominal: Abdomen benign.   Musculoskeletal: Does not exhibit edema.   Neurological: Neurological exam unremarkable.   Vitals reviewed.    Data Review:   [facility], 06/28/2019:  TC - 120  TG - 129  HDL - 33  LDL - 61     Procedures       Assessment:      Diagnosis Plan   1. Coronary artery disease involving native coronary artery of native heart without angina pectoris  Echocardiogram ordered; continue current medications.    2. Bilateral carotid bruits  Carotid ultrasound ordered.   3. Essential hypertension  Well-controlled; continue current medications.    4. Dyslipidemia  Monitored by Dr. Kulkarni; continue pravastatin 40 mg nightly.      Plan:   Echocardiogram and carotid ultrasound ordered in [place].   Symptoms of neck pressure are atypical for angina as he does not have any chest discomfort or shortness of breath.  I have recommended that he should check his blood pressure at the time of the symptoms and try nitroglycerin.  I also recommended a stress test, patient has had previous bad experience and is very reluctant and does not wish to have any sort of stress test at this time.    We will get an echocardiogram to assess his EF and continue management with medical therapy, I explained to him if the symptoms worsens he should contact us as we may need further " evaluation for progression of CAD.    Continue current medications.   FU in 6 MO, sooner as needed.  Thank you for allowing us to participate in the care of your patient.     Scribed for Grady Garcia MD by Christina Mon. 12/1/2020  13:58 EST      I, Grady Garcia MD, personally performed the services described in this documentation as scribed by the above named individual in my presence, and it is both accurate and complete.  12/1/2020  15:34 EST        Please note that portions of this note may have been completed with a voice recognition program. Efforts were made to edit the dictations, but occasionally words are mistranscribed.

## 2020-12-14 ENCOUNTER — HOSPITAL ENCOUNTER (OUTPATIENT)
Dept: CARDIOLOGY | Facility: HOSPITAL | Age: 70
Discharge: HOME | End: 2020-12-14

## 2020-12-14 DIAGNOSIS — I25.10 CORONARY ARTERY DISEASE INVOLVING NATIVE CORONARY ARTERY OF NATIVE HEART WITHOUT ANGINA PECTORIS: ICD-10-CM

## 2020-12-14 DIAGNOSIS — R09.89 BILATERAL CAROTID BRUITS: ICD-10-CM

## 2020-12-14 PROCEDURE — 93306 TTE W/DOPPLER COMPLETE: CPT | Performed by: INTERNAL MEDICINE

## 2020-12-14 PROCEDURE — 93306 TTE W/DOPPLER COMPLETE: CPT

## 2020-12-14 PROCEDURE — 93880 EXTRACRANIAL BILAT STUDY: CPT | Performed by: RADIOLOGY

## 2020-12-14 PROCEDURE — 93880 EXTRACRANIAL BILAT STUDY: CPT

## 2020-12-15 LAB
BH CV ECHO MEAS - ACS: 2.3 CM
BH CV ECHO MEAS - AO ROOT AREA (BSA CORRECTED): 1.7
BH CV ECHO MEAS - AO ROOT AREA: 8.6 CM^2
BH CV ECHO MEAS - AO ROOT DIAM: 3.3 CM
BH CV ECHO MEAS - BSA(HAYCOCK): 2 M^2
BH CV ECHO MEAS - BSA: 1.9 M^2
BH CV ECHO MEAS - BZI_BMI: 24.4 KILOGRAMS/M^2
BH CV ECHO MEAS - BZI_METRIC_HEIGHT: 177.8 CM
BH CV ECHO MEAS - BZI_METRIC_WEIGHT: 77.1 KG
BH CV ECHO MEAS - EDV(CUBED): 74.6 ML
BH CV ECHO MEAS - EDV(MOD-SP4): 54.2 ML
BH CV ECHO MEAS - EDV(TEICH): 79 ML
BH CV ECHO MEAS - EF(CUBED): 59.3 %
BH CV ECHO MEAS - EF(MOD-SP4): 57.7 %
BH CV ECHO MEAS - EF(TEICH): 51.3 %
BH CV ECHO MEAS - ESV(CUBED): 30.4 ML
BH CV ECHO MEAS - ESV(MOD-SP4): 22.9 ML
BH CV ECHO MEAS - ESV(TEICH): 38.5 ML
BH CV ECHO MEAS - FS: 25.9 %
BH CV ECHO MEAS - IVS/LVPW: 1.1
BH CV ECHO MEAS - IVSD: 1.4 CM
BH CV ECHO MEAS - LA DIMENSION: 4.3 CM
BH CV ECHO MEAS - LA/AO: 1.3
BH CV ECHO MEAS - LV DIASTOLIC VOL/BSA (35-75): 27.8 ML/M^2
BH CV ECHO MEAS - LV MASS(C)D: 217.8 GRAMS
BH CV ECHO MEAS - LV MASS(C)DI: 111.8 GRAMS/M^2
BH CV ECHO MEAS - LV SYSTOLIC VOL/BSA (12-30): 11.8 ML/M^2
BH CV ECHO MEAS - LVIDD: 4.2 CM
BH CV ECHO MEAS - LVIDS: 3.1 CM
BH CV ECHO MEAS - LVLD AP4: 6.1 CM
BH CV ECHO MEAS - LVLS AP4: 6.9 CM
BH CV ECHO MEAS - LVOT AREA (M): 3.5 CM^2
BH CV ECHO MEAS - LVOT AREA: 3.5 CM^2
BH CV ECHO MEAS - LVOT DIAM: 2.1 CM
BH CV ECHO MEAS - LVPWD: 1.3 CM
BH CV ECHO MEAS - MV A MAX VEL: 81 CM/SEC
BH CV ECHO MEAS - MV E MAX VEL: 71.6 CM/SEC
BH CV ECHO MEAS - MV E/A: 0.88
BH CV ECHO MEAS - PA ACC TIME: 0.11 SEC
BH CV ECHO MEAS - PA PR(ACCEL): 31.3 MMHG
BH CV ECHO MEAS - SI(CUBED): 22.7 ML/M^2
BH CV ECHO MEAS - SI(MOD-SP4): 16.1 ML/M^2
BH CV ECHO MEAS - SI(TEICH): 20.8 ML/M^2
BH CV ECHO MEAS - SV(CUBED): 44.2 ML
BH CV ECHO MEAS - SV(MOD-SP4): 31.3 ML
BH CV ECHO MEAS - SV(TEICH): 40.5 ML
MAXIMAL PREDICTED HEART RATE: 148 BPM
STRESS TARGET HR: 126 BPM

## 2020-12-16 PROBLEM — R09.89 BILATERAL CAROTID BRUITS: Status: ACTIVE | Noted: 2020-12-16

## 2021-01-25 ENCOUNTER — IMMUNIZATION (OUTPATIENT)
Dept: VACCINE CLINIC | Facility: HOSPITAL | Age: 71
End: 2021-01-25

## 2021-01-25 PROCEDURE — 91300 HC SARSCOV02 VAC 30MCG/0.3ML IM: CPT | Performed by: FAMILY MEDICINE

## 2021-01-25 PROCEDURE — 0001A: CPT | Performed by: FAMILY MEDICINE

## 2021-02-15 ENCOUNTER — APPOINTMENT (OUTPATIENT)
Dept: VACCINE CLINIC | Facility: HOSPITAL | Age: 71
End: 2021-02-15

## 2021-02-17 ENCOUNTER — IMMUNIZATION (OUTPATIENT)
Dept: VACCINE CLINIC | Facility: HOSPITAL | Age: 71
End: 2021-02-17

## 2021-02-17 PROCEDURE — 91300 HC SARSCOV02 VAC 30MCG/0.3ML IM: CPT | Performed by: INTERNAL MEDICINE

## 2021-02-17 PROCEDURE — 0002A: CPT | Performed by: INTERNAL MEDICINE

## 2021-04-23 ENCOUNTER — TELEPHONE (OUTPATIENT)
Dept: CARDIOLOGY | Facility: CLINIC | Age: 71
End: 2021-04-23

## 2021-04-23 DIAGNOSIS — R07.89 CHEST PAIN, ATYPICAL: Primary | ICD-10-CM

## 2021-04-23 NOTE — TELEPHONE ENCOUNTER
----- Message from Grady Garcia MD sent at 4/23/2021  2:27 PM EDT -----  Just heard from his doctor, he is having frequent chest pain with features of unstable angina, I am starting him on Imdur 30 mg daily, please set him up for cardiac cath next week.

## 2021-04-26 ENCOUNTER — TRANSCRIBE ORDERS (OUTPATIENT)
Dept: ADMINISTRATIVE | Facility: HOSPITAL | Age: 71
End: 2021-04-26

## 2021-04-26 ENCOUNTER — LAB (OUTPATIENT)
Dept: LAB | Facility: HOSPITAL | Age: 71
End: 2021-04-26

## 2021-04-26 DIAGNOSIS — Z01.818 OTHER SPECIFIED PRE-OPERATIVE EXAMINATION: Primary | ICD-10-CM

## 2021-04-26 DIAGNOSIS — Z01.818 OTHER SPECIFIED PRE-OPERATIVE EXAMINATION: ICD-10-CM

## 2021-04-26 PROBLEM — R07.89 CHEST PAIN, ATYPICAL: Status: ACTIVE | Noted: 2021-04-26

## 2021-04-26 PROCEDURE — C9803 HOPD COVID-19 SPEC COLLECT: HCPCS

## 2021-04-26 PROCEDURE — U0005 INFEC AGEN DETEC AMPLI PROBE: HCPCS

## 2021-04-26 PROCEDURE — U0004 COV-19 TEST NON-CDC HGH THRU: HCPCS

## 2021-04-27 LAB — SARS-COV-2 RNA NOSE QL NAA+PROBE: NOT DETECTED

## 2021-04-28 ENCOUNTER — HOSPITAL ENCOUNTER (OUTPATIENT)
Facility: HOSPITAL | Age: 71
Discharge: HOME | End: 2021-04-28
Attending: INTERNAL MEDICINE | Admitting: INTERNAL MEDICINE

## 2021-04-28 VITALS
BODY MASS INDEX: 24.11 KG/M2 | RESPIRATION RATE: 18 BRPM | WEIGHT: 168.4 LBS | HEIGHT: 70 IN | OXYGEN SATURATION: 97 % | SYSTOLIC BLOOD PRESSURE: 111 MMHG | HEART RATE: 57 BPM | TEMPERATURE: 97.5 F | DIASTOLIC BLOOD PRESSURE: 76 MMHG

## 2021-04-28 DIAGNOSIS — R07.89 CHEST PAIN, ATYPICAL: ICD-10-CM

## 2021-04-28 PROBLEM — I20.9 ANGINA PECTORIS: Status: ACTIVE | Noted: 2021-04-26

## 2021-04-28 LAB
ACT BLD: 230 SECONDS (ref 82–152)
ANION GAP SERPL CALCULATED.3IONS-SCNC: 9 MMOL/L (ref 5–15)
BUN SERPL-MCNC: 16 MG/DL (ref 8–23)
BUN/CREAT SERPL: 14.5 (ref 7–25)
CALCIUM SPEC-SCNC: 8.5 MG/DL (ref 8.6–10.5)
CHLORIDE SERPL-SCNC: 103 MMOL/L (ref 98–107)
CO2 SERPL-SCNC: 29 MMOL/L (ref 22–29)
CREAT SERPL-MCNC: 1.1 MG/DL (ref 0.76–1.27)
DEPRECATED RDW RBC AUTO: 42.9 FL (ref 37–54)
ERYTHROCYTE [DISTWIDTH] IN BLOOD BY AUTOMATED COUNT: 12.4 % (ref 12.3–15.4)
GFR SERPL CREATININE-BSD FRML MDRD: 66 ML/MIN/1.73
GLUCOSE SERPL-MCNC: 144 MG/DL (ref 65–99)
HCT VFR BLD AUTO: 40.3 % (ref 37.5–51)
HGB BLD-MCNC: 13.7 G/DL (ref 13–17.7)
MCH RBC QN AUTO: 31.8 PG (ref 26.6–33)
MCHC RBC AUTO-ENTMCNC: 34 G/DL (ref 31.5–35.7)
MCV RBC AUTO: 93.5 FL (ref 79–97)
PLATELET # BLD AUTO: 180 10*3/MM3 (ref 140–450)
PMV BLD AUTO: 11.1 FL (ref 6–12)
POTASSIUM SERPL-SCNC: 4 MMOL/L (ref 3.5–5.2)
RBC # BLD AUTO: 4.31 10*6/MM3 (ref 4.14–5.8)
SODIUM SERPL-SCNC: 141 MMOL/L (ref 136–145)
WBC # BLD AUTO: 6.4 10*3/MM3 (ref 3.4–10.8)

## 2021-04-28 PROCEDURE — 0 IOPAMIDOL PER 1 ML: Performed by: INTERNAL MEDICINE

## 2021-04-28 PROCEDURE — 85027 COMPLETE CBC AUTOMATED: CPT | Performed by: INTERNAL MEDICINE

## 2021-04-28 PROCEDURE — 82565 ASSAY OF CREATININE: CPT

## 2021-04-28 PROCEDURE — 63710000001 CLOPIDOGREL 75 MG TABLET: Performed by: INTERNAL MEDICINE

## 2021-04-28 PROCEDURE — 25010000002 HEPARIN (PORCINE) PER 1000 UNITS: Performed by: INTERNAL MEDICINE

## 2021-04-28 PROCEDURE — 25010000002 FENTANYL CITRATE (PF) 100 MCG/2ML SOLUTION: Performed by: INTERNAL MEDICINE

## 2021-04-28 PROCEDURE — C1769 GUIDE WIRE: HCPCS | Performed by: INTERNAL MEDICINE

## 2021-04-28 PROCEDURE — 85347 COAGULATION TIME ACTIVATED: CPT

## 2021-04-28 PROCEDURE — C1725 CATH, TRANSLUMIN NON-LASER: HCPCS | Performed by: INTERNAL MEDICINE

## 2021-04-28 PROCEDURE — C1887 CATHETER, GUIDING: HCPCS | Performed by: INTERNAL MEDICINE

## 2021-04-28 PROCEDURE — 93458 L HRT ARTERY/VENTRICLE ANGIO: CPT | Performed by: INTERNAL MEDICINE

## 2021-04-28 PROCEDURE — C9600 PERC DRUG-EL COR STENT SING: HCPCS | Performed by: INTERNAL MEDICINE

## 2021-04-28 PROCEDURE — 80048 BASIC METABOLIC PNL TOTAL CA: CPT | Performed by: INTERNAL MEDICINE

## 2021-04-28 PROCEDURE — C1894 INTRO/SHEATH, NON-LASER: HCPCS | Performed by: INTERNAL MEDICINE

## 2021-04-28 PROCEDURE — A9270 NON-COVERED ITEM OR SERVICE: HCPCS | Performed by: INTERNAL MEDICINE

## 2021-04-28 PROCEDURE — C1874 STENT, COATED/COV W/DEL SYS: HCPCS | Performed by: INTERNAL MEDICINE

## 2021-04-28 PROCEDURE — 25010000002 MIDAZOLAM PER 1 MG: Performed by: INTERNAL MEDICINE

## 2021-04-28 PROCEDURE — 92928 PRQ TCAT PLMT NTRAC ST 1 LES: CPT | Performed by: INTERNAL MEDICINE

## 2021-04-28 PROCEDURE — S0260 H&P FOR SURGERY: HCPCS | Performed by: PHYSICIAN ASSISTANT

## 2021-04-28 DEVICE — XIENCE SIERRA™ EVEROLIMUS ELUTING CORONARY STENT SYSTEM 2.50 MM X 18 MM / RAPID-EXCHANGE
Type: IMPLANTABLE DEVICE | Status: FUNCTIONAL
Brand: XIENCE SIERRA™

## 2021-04-28 RX ORDER — REPAGLINIDE 1 MG/1
1 TABLET ORAL
COMMUNITY

## 2021-04-28 RX ORDER — CLOPIDOGREL BISULFATE 75 MG/1
TABLET ORAL AS NEEDED
Status: DISCONTINUED | OUTPATIENT
Start: 2021-04-28 | End: 2021-04-28 | Stop reason: HOSPADM

## 2021-04-28 RX ORDER — PHENOL 1.4 %
600 AEROSOL, SPRAY (ML) MUCOUS MEMBRANE 2 TIMES DAILY WITH MEALS
COMMUNITY

## 2021-04-28 RX ORDER — LANOLIN ALCOHOL/MO/W.PET/CERES
1000 CREAM (GRAM) TOPICAL DAILY
COMMUNITY

## 2021-04-28 RX ORDER — SODIUM CHLORIDE 9 MG/ML
100 INJECTION, SOLUTION INTRAVENOUS CONTINUOUS
Status: CANCELLED | OUTPATIENT
Start: 2021-04-28 | End: 2021-04-28

## 2021-04-28 RX ORDER — ACETAMINOPHEN 325 MG/1
650 TABLET ORAL EVERY 4 HOURS PRN
Status: CANCELLED | OUTPATIENT
Start: 2021-04-28

## 2021-04-28 RX ORDER — TRAMADOL HYDROCHLORIDE 300 MG/1
300 CAPSULE ORAL DAILY
COMMUNITY

## 2021-04-28 RX ORDER — HEPARIN SODIUM 1000 [USP'U]/ML
INJECTION, SOLUTION INTRAVENOUS; SUBCUTANEOUS AS NEEDED
Status: DISCONTINUED | OUTPATIENT
Start: 2021-04-28 | End: 2021-04-28 | Stop reason: HOSPADM

## 2021-04-28 RX ORDER — METOPROLOL SUCCINATE 25 MG/1
50 TABLET, EXTENDED RELEASE ORAL DAILY
Qty: 90 TABLET | Refills: 3 | Status: SHIPPED | OUTPATIENT
Start: 2021-04-28 | End: 2021-06-15

## 2021-04-28 RX ORDER — CLOPIDOGREL BISULFATE 75 MG/1
75 TABLET ORAL DAILY
Qty: 90 TABLET | Refills: 3 | Status: SHIPPED | OUTPATIENT
Start: 2021-04-28 | End: 2022-04-15 | Stop reason: SDUPTHER

## 2021-04-28 RX ORDER — LIDOCAINE HYDROCHLORIDE 10 MG/ML
INJECTION, SOLUTION EPIDURAL; INFILTRATION; INTRACAUDAL; PERINEURAL AS NEEDED
Status: DISCONTINUED | OUTPATIENT
Start: 2021-04-28 | End: 2021-04-28 | Stop reason: HOSPADM

## 2021-04-28 RX ORDER — FENTANYL CITRATE 50 UG/ML
INJECTION, SOLUTION INTRAMUSCULAR; INTRAVENOUS AS NEEDED
Status: DISCONTINUED | OUTPATIENT
Start: 2021-04-28 | End: 2021-04-28 | Stop reason: HOSPADM

## 2021-04-28 RX ORDER — NITROGLYCERIN 0.4 MG/1
TABLET SUBLINGUAL
Qty: 100 TABLET | Refills: 1 | Status: SHIPPED | OUTPATIENT
Start: 2021-04-28

## 2021-04-28 RX ORDER — GABAPENTIN 300 MG/1
300 CAPSULE ORAL DAILY
COMMUNITY
End: 2023-02-14

## 2021-04-28 RX ORDER — MIDAZOLAM HYDROCHLORIDE 1 MG/ML
INJECTION INTRAMUSCULAR; INTRAVENOUS AS NEEDED
Status: DISCONTINUED | OUTPATIENT
Start: 2021-04-28 | End: 2021-04-28 | Stop reason: HOSPADM

## 2021-04-29 ENCOUNTER — DOCUMENTATION (OUTPATIENT)
Dept: CARDIAC REHAB | Facility: HOSPITAL | Age: 71
End: 2021-04-29

## 2021-04-29 LAB — CREAT BLDA-MCNC: 1.1 MG/DL (ref 0.6–1.3)

## 2021-05-19 ENCOUNTER — TELEPHONE (OUTPATIENT)
Dept: CARDIOLOGY | Facility: CLINIC | Age: 71
End: 2021-05-19

## 2021-05-19 NOTE — TELEPHONE ENCOUNTER
Pt calling w complaints of low HR & BP. HR 50s, -110/70s. Pt currently taking 50 mg metoprolol QD. Encouraged pt to half to 25 mg QD and call back in 1 week w readings. Pt verbalized understanding and is  Agreeable.

## 2021-05-21 ENCOUNTER — DOCUMENTATION (OUTPATIENT)
Dept: CARDIAC REHAB | Facility: HOSPITAL | Age: 71
End: 2021-05-21

## 2021-05-21 NOTE — PROGRESS NOTES
Cardiac Rehab staff mailed referral letter to patient regarding Phase II Cardiac Rehab program. Instruction for patient to contact [facility] Cardiac Rehab Department for additional program information and to forward referral to closest facility.

## 2021-06-14 NOTE — PROGRESS NOTES
[facility] Cardiology    Encounter Date: 06/15/2021    Patient ID: Alfredito Marcus is a 71 y.o. male.  : 1950     PCP: Pastora Kulkarni MD       Chief Complaint: Coronary Artery Disease      PROBLEM LIST:  1. Coronary artery disease:  a. Unstable angina with abnormal Cardiolite stress test, 2008.  b. [facility], 2008, Dr. Garcia: 99% LAD stenosis s/p 2 overlapping Cypher LIZZETH with balloon angioplasty of D1. Rest of the vessels is free from significant disease and LV function was normal.  c. Cardiolite stress test, 2011: Mild apical ischemia. EF 64%.  d. [facility], 2011, Dr. Garcia: Patent stents of the LAD, no significant disease. Normal EF.  e. [facility], 2016, Dr. Edouard: Non-flow-limiting CAD. Widely patent LAD stents. Mild (20%-30%) disease in the LAD and RCA. Normal EF.  f. Echocardiogram, 2020: EF 56-60%. LVDF consistent with impaired relaxation. No significant valvular heart disease. No pericardial effusion.   g. [facility], 2021, Dr. Garcia: EF 65%. 90% stenosis of mid LAD stented with 2.5 x 18 mm LIZZETH and reduced to 0%. Patent stents of the proximal to mid LAD. Residual nonobstructive disease of the distal LAD, the first marginal and the RCA.   2. Bilateral carotid bruits  a. Carotid duplex, 2020: No hemodynamically single plaques or stenoses were demonstrated in carotid systems. Both vertebral artery flows are antegrade.   3. Hypertension.  4. Dyslipidemia.  5. Osteoarthritis.  6. Gout.  7. History of gluten intolerance.  8. H/O degenerative disc disease.  9. Closed fracture of proximal end of left humerus.    History of Present Illness  Patient presents today for a 6 month follow-up with a history of coronary artery disease, bilateral carotid bruits, and cardiac risk factors. Since last visit, the patient reports experiencing a slow pulse and constant fatigue. He occasionally feels as if his heart is fluttering but this is not brought on by physical  activity. He can golf and mow his lawn without any issues. He has not tried nitroglycerin to relieve these symptoms. Patient reports taking a quarter of his metoprolol daily. He also takes Coenzyme Q-10 and vitamin D but states that the change in his cholesterol medication from Pravachol to Crestor is causing myalgias.  Patient denies significant chest pain, shortness of breath, edema, dizziness, and syncope. The patient asks about his stenting history and what the outcome would be if stenosis worsened; this was this discussed with him.       Allergies   Allergen Reactions   • Shrimp (Diagnostic) Other (See Comments)     Lip swelling and hives   • Iodine Unknown - Low Severity         Current Outpatient Medications:   •  amLODIPine (NORVASC) 10 MG tablet, Take 10 mg by mouth daily., Disp: , Rfl:   •  aspirin 325 MG tablet, Take 325 mg by mouth Daily., Disp: , Rfl:   •  calcium carbonate (OS-HIRAM) 600 MG tablet, Take 600 mg by mouth 2 (Two) Times a Day With Meals., Disp: , Rfl:   •  cholecalciferol (VITAMIN D3) 1000 units tablet, Take 1,000 Units by mouth 2 (Two) Times a Day., Disp: , Rfl:   •  clopidogrel (PLAVIX) 75 MG tablet, Take 1 tablet by mouth Daily., Disp: 90 tablet, Rfl: 3  •  Coenzyme Q10 (COQ10 PO), Take 100 mg by mouth daily., Disp: , Rfl:   •  docusate sodium (COLACE) 100 MG capsule, Take 300 mg by mouth every night., Disp: , Rfl:   •  folic acid (FOLVITE) 1 MG tablet, Take 1 mg by mouth Daily., Disp: , Rfl:   •  gabapentin (NEURONTIN) 300 MG capsule, Take 300 mg by mouth Daily., Disp: , Rfl:   •  JANUVIA 100 MG tablet, Take 100 mg by mouth Daily., Disp: , Rfl:   •  lansoprazole (PREVACID) 30 MG capsule, Take 30 mg by mouth daily., Disp: , Rfl:   •  latanoprost (XALATAN) 0.005 % ophthalmic solution, Administer 1 drop to the right eye every night., Disp: , Rfl:   •  metoprolol succinate XL (TOPROL-XL) 50 MG 24 hr tablet, Take 12.5 mg by mouth Daily., Disp: , Rfl:   •  nitroglycerin (NITROSTAT) 0.4 MG SL  "tablet, 1 under the tongue as needed for angina, may repeat q5mins for up three doses, Disp: 100 tablet, Rfl: 1  •  olmesartan (BENICAR) 40 MG tablet, Take 40 mg by mouth Daily., Disp: , Rfl:   •  Omega-3-Acid Eth Est, Dietary, 1 G capsule, Take 1 capsule by mouth 2 (two) times a day., Disp: , Rfl:   •  repaglinide (PRANDIN) 1 MG tablet, Take 1 mg by mouth 2 (Two) Times a Day Before Meals., Disp: , Rfl:   •  rosuvastatin (CRESTOR) 20 MG tablet, Take 20 mg by mouth Daily., Disp: , Rfl:   •  traMADol HCl  MG capsule sustained-release 24 hr, Take 300 mg by mouth Daily., Disp: , Rfl:   •  vitamin B-12 (CYANOCOBALAMIN) 1000 MCG tablet, Take 1,000 mcg by mouth Daily., Disp: , Rfl:     The following portions of the patient's history were reviewed and updated as appropriate: allergies, current medications, past family history, past medical history, past social history, past surgical history and problem list.    ROS  Review of Systems   Constitution: Negative for chills, fever, generalized weakness. Positive for fatigue  Cardiovascular: Negative for chest pain, dyspnea on exertion, leg swelling, orthopnea, and syncope. Positive for Palpitations  Respiratory: Negative for cough, shortness of breath, and wheezing.  HENT: Negative for ear pain, nosebleeds, and tinnitus.  Gastrointestinal: Negative for abdominal pain, constipation, diarrhea, nausea and vomiting.   Genitourinary: No urinary symptoms.  Musculoskeletal: Negative for muscle cramps.  Neurological: Negative for dizziness, headaches, loss of balance, numbness, and symptoms of stroke.  Psychiatric: Normal mental status.     All other systems reviewed and are negative.        Objective:     /72 (BP Location: Left arm, Patient Position: Sitting)   Pulse 58   Ht 177.8 cm (70\")   Wt 74.8 kg (165 lb)   SpO2 98%   BMI 23.68 kg/m²      Physical Exam  Constitutional: Patient appears well-developed and well-nourished.   HENT: HEENT exam unremarkable.   Neck: " Neck supple. No JVD present. No carotid bruits.   Cardiovascular: Normal rate, regular rhythm and normal heart sounds. No murmur heard.   2+ symmetric pulses.   Pulmonary/Chest: Breath sounds normal. Does not exhibit tenderness.   Abdominal: Abdomen benign.   Musculoskeletal: Does not exhibit edema.   Neurological: Neurological exam unremarkable.   Vitals reviewed.    Data Review:   Lab Results   Component Value Date    GLUCOSE 144 (H) 04/28/2021    BUN 16 04/28/2021    CREATININE 1.10 04/28/2021    CREATININE 1.10 04/28/2021    EGFRIFNONA 66 04/28/2021    BCR 14.5 04/28/2021    K 4.0 04/28/2021    CO2 29.0 04/28/2021    CALCIUM 8.5 (L) 04/28/2021      Lab Results   Component Value Date    WBC 6.40 04/28/2021    RBC 4.31 04/28/2021    HGB 13.7 04/28/2021    HCT 40.3 04/28/2021    MCV 93.5 04/28/2021     04/28/2021        Procedures       Assessment:      Diagnosis Plan   1. Palpitations and reported bradycardia/fatigue 48 hour holter placed on patient and discontinue metoprolol    2. Coronary artery disease involving native coronary artery of native heart without angina pectoris  Stable without angina; continue aspirin and Plavix daily and nitroglycerin as needed   3. Essential hypertension  Well controlled; continue current medications   4. Dyslipidemia  Monitored by PCP; if cholesterol becomes uncontrolled may change statin therapy to injections     Plan:   Discontinue metoprolol due to low pulse and symptoms of fatigue.  48h Holter monitor to evaluate palpitations.  May initiate PCSK9 injection therapy if not able to tolerate Crestor, he was advised to discuss this further with PCP.    Overall cardiac status is stable with no complaints of exertional symptoms.  Continue current medications.   FU in 6 MO, sooner as needed.  Thank you for allowing us to participate in the care of your patient.     Scribed for Grady Garcia MD by Hailey Carrillo. 6/15/2021 13:32 EDT    I, Grady Garcia MD, personally performed  the services described in this documentation as scribed by the above named individual in my presence, and it is both accurate and complete.  6/15/2021  14:12 EDT        Please note that portions of this note may have been completed with a voice recognition program. Efforts were made to edit the dictations, but occasionally words are mistranscribed.

## 2021-06-15 ENCOUNTER — OFFICE VISIT (OUTPATIENT)
Dept: CARDIOLOGY | Facility: CLINIC | Age: 71
End: 2021-06-15

## 2021-06-15 VITALS
HEART RATE: 58 BPM | BODY MASS INDEX: 23.62 KG/M2 | WEIGHT: 165 LBS | OXYGEN SATURATION: 98 % | HEIGHT: 70 IN | SYSTOLIC BLOOD PRESSURE: 124 MMHG | DIASTOLIC BLOOD PRESSURE: 72 MMHG

## 2021-06-15 DIAGNOSIS — R00.2 PALPITATIONS: Primary | ICD-10-CM

## 2021-06-15 DIAGNOSIS — E78.5 DYSLIPIDEMIA: ICD-10-CM

## 2021-06-15 DIAGNOSIS — I25.10 CORONARY ARTERY DISEASE INVOLVING NATIVE CORONARY ARTERY OF NATIVE HEART WITHOUT ANGINA PECTORIS: ICD-10-CM

## 2021-06-15 DIAGNOSIS — I10 ESSENTIAL HYPERTENSION: ICD-10-CM

## 2021-06-15 PROCEDURE — 99214 OFFICE O/P EST MOD 30 MIN: CPT | Performed by: INTERNAL MEDICINE

## 2021-06-15 RX ORDER — ROSUVASTATIN CALCIUM 20 MG/1
20 TABLET, COATED ORAL DAILY
COMMUNITY

## 2021-06-15 RX ORDER — METOPROLOL SUCCINATE 50 MG/1
50 TABLET, EXTENDED RELEASE ORAL DAILY
COMMUNITY
End: 2023-02-14

## 2021-08-27 ENCOUNTER — TRANSCRIBE ORDERS (OUTPATIENT)
Dept: ADMINISTRATIVE | Facility: HOSPITAL | Age: 71
End: 2021-08-27

## 2021-08-27 ENCOUNTER — HOSPITAL ENCOUNTER (OUTPATIENT)
Dept: CT IMAGING | Facility: HOSPITAL | Age: 71
Discharge: HOME | End: 2021-08-27
Admitting: INTERNAL MEDICINE

## 2021-08-27 DIAGNOSIS — R10.31 ABDOMINAL PAIN, RIGHT LOWER QUADRANT: Primary | ICD-10-CM

## 2021-08-27 DIAGNOSIS — R10.31 ABDOMINAL PAIN, RIGHT LOWER QUADRANT: ICD-10-CM

## 2021-08-27 PROCEDURE — 74176 CT ABD & PELVIS W/O CONTRAST: CPT | Performed by: RADIOLOGY

## 2021-08-27 PROCEDURE — 74176 CT ABD & PELVIS W/O CONTRAST: CPT

## 2021-10-05 ENCOUNTER — IMMUNIZATION (OUTPATIENT)
Dept: VACCINE CLINIC | Facility: HOSPITAL | Age: 71
End: 2021-10-05

## 2021-10-05 PROCEDURE — 0003A: CPT | Performed by: INTERNAL MEDICINE

## 2021-10-05 PROCEDURE — 91300 HC SARSCOV02 VAC 30MCG/0.3ML IM: CPT | Performed by: INTERNAL MEDICINE

## 2021-10-05 PROCEDURE — 0004A ADM SARSCOV2 30MCG/0.3ML BOOSTER: CPT | Performed by: INTERNAL MEDICINE

## 2022-03-07 ENCOUNTER — TRANSCRIBE ORDERS (OUTPATIENT)
Dept: ADMINISTRATIVE | Facility: HOSPITAL | Age: 72
End: 2022-03-07

## 2022-03-07 DIAGNOSIS — E07.89 HEMORRHAGE AND INFARCTION OF THYROID: Primary | ICD-10-CM

## 2022-03-17 ENCOUNTER — HOSPITAL ENCOUNTER (OUTPATIENT)
Dept: ULTRASOUND IMAGING | Facility: HOSPITAL | Age: 72
Discharge: HOME | End: 2022-03-17
Admitting: INTERNAL MEDICINE

## 2022-03-17 ENCOUNTER — TRANSCRIBE ORDERS (OUTPATIENT)
Dept: ADMINISTRATIVE | Facility: HOSPITAL | Age: 72
End: 2022-03-17

## 2022-03-17 DIAGNOSIS — E07.89 HEMORRHAGE AND INFARCTION OF THYROID: ICD-10-CM

## 2022-03-17 DIAGNOSIS — E07.89 HEMORRHAGE AND INFARCTION OF THYROID: Primary | ICD-10-CM

## 2022-03-17 PROCEDURE — 76536 US EXAM OF HEAD AND NECK: CPT

## 2022-03-17 PROCEDURE — 76536 US EXAM OF HEAD AND NECK: CPT | Performed by: RADIOLOGY

## 2022-03-18 ENCOUNTER — HOSPITAL ENCOUNTER (OUTPATIENT)
Dept: CT IMAGING | Facility: HOSPITAL | Age: 72
Discharge: HOME | End: 2022-03-18
Admitting: INTERNAL MEDICINE

## 2022-03-18 DIAGNOSIS — E07.89 HEMORRHAGE AND INFARCTION OF THYROID: ICD-10-CM

## 2022-03-18 PROCEDURE — 70490 CT SOFT TISSUE NECK W/O DYE: CPT | Performed by: RADIOLOGY

## 2022-03-18 PROCEDURE — 70490 CT SOFT TISSUE NECK W/O DYE: CPT

## 2022-04-15 RX ORDER — CLOPIDOGREL BISULFATE 75 MG/1
75 TABLET ORAL DAILY
Qty: 90 TABLET | Refills: 3 | Status: SHIPPED | OUTPATIENT
Start: 2022-04-15 | End: 2023-03-31

## 2022-04-22 ENCOUNTER — APPOINTMENT (OUTPATIENT)
Dept: GENERAL RADIOLOGY | Facility: HOSPITAL | Age: 72
End: 2022-04-22

## 2022-04-22 ENCOUNTER — HOSPITAL ENCOUNTER (EMERGENCY)
Facility: HOSPITAL | Age: 72
Discharge: HOME | End: 2022-04-22
Attending: EMERGENCY MEDICINE | Admitting: EMERGENCY MEDICINE

## 2022-04-22 ENCOUNTER — APPOINTMENT (OUTPATIENT)
Dept: CT IMAGING | Facility: HOSPITAL | Age: 72
End: 2022-04-22

## 2022-04-22 VITALS
RESPIRATION RATE: 16 BRPM | TEMPERATURE: 97.9 F | HEART RATE: 70 BPM | DIASTOLIC BLOOD PRESSURE: 62 MMHG | HEIGHT: 70 IN | SYSTOLIC BLOOD PRESSURE: 107 MMHG | OXYGEN SATURATION: 95 % | BODY MASS INDEX: 21.76 KG/M2 | WEIGHT: 152 LBS

## 2022-04-22 DIAGNOSIS — S00.03XA CONTUSION OF SCALP, INITIAL ENCOUNTER: ICD-10-CM

## 2022-04-22 DIAGNOSIS — M25.511 ACUTE PAIN OF RIGHT SHOULDER: Primary | ICD-10-CM

## 2022-04-22 PROCEDURE — 73030 X-RAY EXAM OF SHOULDER: CPT

## 2022-04-22 PROCEDURE — 70450 CT HEAD/BRAIN W/O DYE: CPT

## 2022-04-22 PROCEDURE — 72125 CT NECK SPINE W/O DYE: CPT

## 2022-04-22 PROCEDURE — 99282 EMERGENCY DEPT VISIT SF MDM: CPT

## 2022-04-23 NOTE — ED PROVIDER NOTES
Subjective   Patient is a 72-year-old male with GERD, coronary artery disease, hypertension, hyperlipidemia, and gout that presents to the ED with acute injury to the right shoulder and head.  He states he was at a baseball game when the injury occurred.  He states a baseball came up and hit the right shoulder and then ricocheted off of his shoulder and hit the back of his head.  He is complaining of pain along the right shoulder as well as the back of the head.  He denies loss of consciousness.  He denies chest pain, shortness of breath, fever, chills, nausea, vomiting, headache, dizziness, abdominal pain, extremity weakness or paresthesias, dysuria, diarrhea, or constipation.      History provided by:  Patient   used: No        Review of Systems   Constitutional: Negative.  Negative for chills, diaphoresis, fatigue and fever.   HENT: Negative.  Negative for congestion, drooling, ear discharge, ear pain, facial swelling, postnasal drip, rhinorrhea, sinus pressure, sinus pain, sneezing, sore throat, tinnitus and trouble swallowing.    Eyes: Negative.  Negative for photophobia, pain, discharge, redness and itching.   Respiratory: Negative.  Negative for cough and shortness of breath.    Cardiovascular: Negative.  Negative for chest pain.   Gastrointestinal: Negative.  Negative for abdominal distention, abdominal pain, constipation, diarrhea, nausea and vomiting.   Endocrine: Negative.    Genitourinary: Negative.  Negative for difficulty urinating, dysuria, flank pain, frequency and urgency.   Musculoskeletal: Positive for arthralgias. Negative for back pain, gait problem, joint swelling, myalgias, neck pain and neck stiffness.   Skin: Negative.    Neurological: Negative.  Negative for dizziness, tremors, seizures, syncope, facial asymmetry, speech difficulty, weakness, light-headedness, numbness and headaches.   Psychiatric/Behavioral: Negative.  Negative for confusion.   All other systems  reviewed and are negative.      Past Medical History:   Diagnosis Date   • CAD (coronary artery disease)    • Closed fracture of proximal end of left humerus 9/3/2016   • Dyslipidemia    • GERD (gastroesophageal reflux disease)    • Gout    • H/O degenerative disc disease     Degenerative disc disease of the cervical and lumbar spine/chronic back pain   • History of gluten intolerance    • Hypertension    • Myocardial infarction (CMS/HCC)    • Osteoarthritis        Allergies   Allergen Reactions   • Shrimp (Diagnostic) Other (See Comments)     Lip swelling and hives   • Iodine Unknown - Low Severity       Past Surgical History:   Procedure Laterality Date   • APPENDECTOMY     • CARDIAC CATHETERIZATION     • CARDIAC CATHETERIZATION Left 4/28/2021    Procedure: Left Heart Cath;  Surgeon: Grady Garcia MD;  Location: [facility] CATH INVASIVE LOCATION;  Service: Cardiology;  Laterality: Left;   • CORONARY ANGIOPLASTY WITH STENT PLACEMENT     • CYSTOSCOPY TRANSURETHRAL RESECTION OF PROSTATE     • LUMBAR FUSION     • REPLACEMENT TOTAL HIP LATERAL POSITION Right 2018       Family History   Problem Relation Age of Onset   • No Known Problems Mother    • No Known Problems Father        Social History     Socioeconomic History   • Marital status:    Tobacco Use   • Smoking status: Never Smoker   • Smokeless tobacco: Never Used   Vaping Use   • Vaping Use: Never used   Substance and Sexual Activity   • Alcohol use: No   • Drug use: No   • Sexual activity: Defer           Objective   Physical Exam  Vitals and nursing note reviewed.   Constitutional:       General: He is not in acute distress.     Appearance: He is well-developed. He is not diaphoretic.   HENT:      Head: Normocephalic. Contusion present.      Right Ear: External ear normal.      Left Ear: External ear normal.      Nose: Nose normal.      Mouth/Throat:      Mouth: Mucous membranes are moist.      Pharynx: Oropharynx is clear.   Eyes:      Extraocular  Movements: Extraocular movements intact.      Conjunctiva/sclera: Conjunctivae normal.      Pupils: Pupils are equal, round, and reactive to light.   Neck:      Vascular: No JVD.      Trachea: No tracheal deviation.   Cardiovascular:      Rate and Rhythm: Normal rate and regular rhythm.      Pulses: Normal pulses.      Heart sounds: Normal heart sounds. No murmur heard.  Pulmonary:      Effort: Pulmonary effort is normal. No respiratory distress.      Breath sounds: Normal breath sounds. No wheezing.   Abdominal:      General: Bowel sounds are normal. There is no distension.      Palpations: Abdomen is soft.      Tenderness: There is no abdominal tenderness. There is no guarding or rebound.   Musculoskeletal:         General: No deformity. Normal range of motion.      Right shoulder: Tenderness present. No swelling, deformity, effusion, laceration, bony tenderness or crepitus. Normal range of motion. Normal strength. Normal pulse.      Left shoulder: Normal.      Cervical back: Normal range of motion and neck supple.   Skin:     General: Skin is warm and dry.      Coloration: Skin is not pale.      Findings: No erythema or rash.   Neurological:      General: No focal deficit present.      Mental Status: He is alert and oriented to person, place, and time.      Cranial Nerves: No cranial nerve deficit.      Sensory: No sensory deficit.      Motor: No weakness.      Coordination: Coordination normal.      Gait: Gait normal.   Psychiatric:         Mood and Affect: Mood normal.         Behavior: Behavior normal.         Thought Content: Thought content normal.         Procedures           ED Course  ED Course as of 04/22/22 2302 Fri Apr 22, 2022   9381 XR Shoulder 2+ View Right  IMPRESSION:     1. No acute fracture or malalignment.  2. AC joint arthropathy with progressive glenohumeral arthropathy.     Signer Name: Burak Jung MD   Signed: 4/22/2022 10:52 PM []   4013 CT Head Without  Contrast  IMPRESSION:  Normal, negative unenhanced head CT.              Signer Name: SAMSON Garcia MD   Signed: 4/22/2022 10:17 PM []   2300 Endorsed []   2301 CT Cervical Spine Without Contrast    IMPRESSION:     1. No acute cervical spine fracture.  2. Stable appearance of multilevel degenerative disc disease and facet arthropathy in addition to spondylolisthesis.     Signer Name: Burak Jung MD   Signed: 4/22/2022 10:57 PM []   2301 Discussed plan of care with patient.  Advised if symptoms worsen return to ED.  Advised follow-up with PCP. []      ED Course User Index  [] Kate Arenas PA-C                                                 [facility]    Final diagnoses:   Acute pain of right shoulder   Contusion of scalp, initial encounter       ED Disposition  ED Disposition     ED Disposition   Discharge    Condition   Stable    Comment   --             Pastora Kulkarni MD  [address] [place] [address]  [id number]  [facility] [place] [address]  [phone]    Schedule an appointment as soon as possible for a visit in 1 day           Medication List      No changes were made to your prescriptions during this visit.          Kate Arenas PA-C  04/22/22 2302

## 2022-04-29 ENCOUNTER — TRANSCRIBE ORDERS (OUTPATIENT)
Dept: ADMINISTRATIVE | Facility: HOSPITAL | Age: 72
End: 2022-04-29

## 2022-04-29 DIAGNOSIS — I95.9 HYPOTENSION, UNSPECIFIED HYPOTENSION TYPE: Primary | ICD-10-CM

## 2022-05-04 ENCOUNTER — OFFICE VISIT (OUTPATIENT)
Dept: SURGERY | Facility: CLINIC | Age: 72
End: 2022-05-04

## 2022-05-04 VITALS
DIASTOLIC BLOOD PRESSURE: 62 MMHG | WEIGHT: 152 LBS | HEIGHT: 70 IN | HEART RATE: 62 BPM | SYSTOLIC BLOOD PRESSURE: 108 MMHG | BODY MASS INDEX: 21.76 KG/M2

## 2022-05-04 DIAGNOSIS — R09.89 LYMPH NODE SYMPTOM: Primary | ICD-10-CM

## 2022-05-04 PROCEDURE — 99203 OFFICE O/P NEW LOW 30 MIN: CPT | Performed by: SURGERY

## 2022-05-04 NOTE — PROGRESS NOTES
Subjective   Alfredito Marcus is a 72 y.o. male is being seen for consultation today at the request of Pastora Kulkarni MD    Alfredito Marcus is a 72 y.o. male With bilateral cervical lymphadenopathy left greater than right.  Due to the persistent nature of the enlarged lymph nodes and some recent swelling of the left parotid region he will undergo image guided fine-needle aspiration radiology.  We will consult with his cardiologist to see if it is safe for him to stop Plavix as he has a history of coronary artery stents with the last one being placed just over 1 year ago.  He has stopped Plavix in the past.      Past Medical History:   Diagnosis Date   • CAD (coronary artery disease)    • Closed fracture of proximal end of left humerus 9/3/2016   • Dyslipidemia    • GERD (gastroesophageal reflux disease)    • Gout    • H/O degenerative disc disease     Degenerative disc disease of the cervical and lumbar spine/chronic back pain   • History of gluten intolerance    • Hypertension    • Myocardial infarction (HCC)    • Osteoarthritis        Family History   Problem Relation Age of Onset   • No Known Problems Mother    • Cancer Father    • Cancer Son        Social History     Socioeconomic History   • Marital status:    Tobacco Use   • Smoking status: Never Smoker   • Smokeless tobacco: Never Used   Vaping Use   • Vaping Use: Never used   Substance and Sexual Activity   • Alcohol use: No   • Drug use: No   • Sexual activity: Defer       Past Surgical History:   Procedure Laterality Date   • APPENDECTOMY     • CARDIAC CATHETERIZATION     • CARDIAC CATHETERIZATION Left 4/28/2021    Procedure: Left Heart Cath;  Surgeon: Grady Garcia MD;  [facility];  Service: Cardiology;  Laterality: Left;   • CORONARY ANGIOPLASTY WITH STENT PLACEMENT     • CYSTOSCOPY TRANSURETHRAL RESECTION OF PROSTATE     • LUMBAR FUSION     • REPLACEMENT TOTAL HIP LATERAL POSITION Right 2018       Review of Systems  "  Constitutional: Negative for activity change, appetite change, chills and fever.   HENT: Negative for sore throat and trouble swallowing.    Eyes: Negative for visual disturbance.   Respiratory: Negative for cough and shortness of breath.    Cardiovascular: Negative for chest pain and palpitations.   Gastrointestinal: Negative for abdominal distention, abdominal pain, blood in stool, constipation, diarrhea, nausea and vomiting.   Endocrine: Negative for cold intolerance and heat intolerance.   Genitourinary: Negative for dysuria.   Musculoskeletal: Negative for joint swelling.   Skin: Negative for color change, rash and wound.   Allergic/Immunologic: Negative for immunocompromised state.   Neurological: Negative for dizziness, seizures, weakness and headaches.   Hematological: Positive for adenopathy. Does not bruise/bleed easily.   Psychiatric/Behavioral: Negative for agitation and confusion.         /62 (BP Location: Left arm)   Pulse 62   Ht 177.8 cm (70\")   Wt 68.9 kg (152 lb)   BMI 21.81 kg/m²   Objective   Physical Exam  Constitutional:       Appearance: He is well-developed.   HENT:      Head: Normocephalic and atraumatic.   Eyes:      Conjunctiva/sclera: Conjunctivae normal.      Pupils: Pupils are equal, round, and reactive to light.   Neck:      Thyroid: No thyromegaly.      Vascular: No JVD.      Trachea: No tracheal deviation.   Cardiovascular:      Rate and Rhythm: Normal rate and regular rhythm.      Heart sounds: No murmur heard.    No friction rub. No gallop.   Pulmonary:      Effort: Pulmonary effort is normal.      Breath sounds: Normal breath sounds.   Abdominal:      General: There is no distension.      Palpations: Abdomen is soft. There is no hepatomegaly or splenomegaly.      Tenderness: There is no abdominal tenderness.      Hernia: No hernia is present.   Musculoskeletal:         General: No deformity. Normal range of motion.      Cervical back: Neck supple.   Lymphadenopathy:    "   Cervical: Cervical adenopathy present.      Right cervical: Superficial cervical adenopathy present.      Left cervical: Superficial cervical adenopathy present.   Skin:     General: Skin is warm and dry.   Neurological:      Mental Status: He is alert and oriented to person, place, and time.               Assessment   Diagnoses and all orders for this visit:    1. Lymph node symptom (Primary)  -     US Guided Lymph Node Biopsy; Future      Alfredito Marcus is a 72 y.o. male with bilateral but left greater than right cervical lymphadenopathy.  Image guided biopsy will be performed and we will obtain cardiac clearance to see if he can hold Plavix prior to the procedure.  Follow-up after pathology returns.    BMI is within normal parameters. No follow-up required.

## 2022-05-06 DIAGNOSIS — R09.89 LYMPH NODE SYMPTOM: Primary | ICD-10-CM

## 2022-05-26 ENCOUNTER — HOSPITAL ENCOUNTER (OUTPATIENT)
Dept: CARDIOLOGY | Facility: HOSPITAL | Age: 72
End: 2022-05-26

## 2022-05-27 ENCOUNTER — HOSPITAL ENCOUNTER (OUTPATIENT)
Dept: ULTRASOUND IMAGING | Facility: HOSPITAL | Age: 72
Discharge: HOME | End: 2022-05-27
Admitting: SURGERY

## 2022-05-27 DIAGNOSIS — R09.89 LYMPH NODE SYMPTOM: ICD-10-CM

## 2022-05-27 PROCEDURE — 76536 US EXAM OF HEAD AND NECK: CPT | Performed by: RADIOLOGY

## 2022-05-27 PROCEDURE — 76536 US EXAM OF HEAD AND NECK: CPT

## 2022-05-31 ENCOUNTER — HOSPITAL ENCOUNTER (OUTPATIENT)
Dept: CARDIOLOGY | Facility: HOSPITAL | Age: 72
Discharge: HOME | End: 2022-05-31
Admitting: INTERNAL MEDICINE

## 2022-05-31 DIAGNOSIS — I95.9 HYPOTENSION, UNSPECIFIED HYPOTENSION TYPE: ICD-10-CM

## 2022-05-31 PROCEDURE — 93306 TTE W/DOPPLER COMPLETE: CPT

## 2022-05-31 PROCEDURE — 93306 TTE W/DOPPLER COMPLETE: CPT | Performed by: INTERNAL MEDICINE

## 2022-06-01 LAB
BH CV ECHO MEAS - ACS: 2.3 CM
BH CV ECHO MEAS - AO MAX PG: 8 MMHG
BH CV ECHO MEAS - AO MEAN PG: 4 MMHG
BH CV ECHO MEAS - AO ROOT DIAM: 3.2 CM
BH CV ECHO MEAS - AO V2 MAX: 141 CM/SEC
BH CV ECHO MEAS - AO V2 VTI: 25.1 CM
BH CV ECHO MEAS - EDV(CUBED): 85.2 ML
BH CV ECHO MEAS - EDV(MOD-SP4): 61.4 ML
BH CV ECHO MEAS - EF(MOD-SP4): 63.8 %
BH CV ECHO MEAS - ESV(CUBED): 39.3 ML
BH CV ECHO MEAS - ESV(MOD-SP4): 22.2 ML
BH CV ECHO MEAS - FS: 22.7 %
BH CV ECHO MEAS - IVS/LVPW: 0.9 CM
BH CV ECHO MEAS - IVSD: 0.9 CM
BH CV ECHO MEAS - LA DIMENSION: 2.5 CM
BH CV ECHO MEAS - LAT PEAK E' VEL: 8.2 CM/SEC
BH CV ECHO MEAS - LV DIASTOLIC VOL/BSA (35-75): 33.1 CM2
BH CV ECHO MEAS - LV MASS(C)D: 137.8 GRAMS
BH CV ECHO MEAS - LV SYSTOLIC VOL/BSA (12-30): 12 CM2
BH CV ECHO MEAS - LVIDD: 4.4 CM
BH CV ECHO MEAS - LVIDS: 3.4 CM
BH CV ECHO MEAS - LVOT AREA: 2.8 CM2
BH CV ECHO MEAS - LVOT DIAM: 1.9 CM
BH CV ECHO MEAS - LVPWD: 1 CM
BH CV ECHO MEAS - MED PEAK E' VEL: 7.5 CM/SEC
BH CV ECHO MEAS - MV A MAX VEL: 102 CM/SEC
BH CV ECHO MEAS - MV E MAX VEL: 64.3 CM/SEC
BH CV ECHO MEAS - MV E/A: 0.63
BH CV ECHO MEAS - PA ACC TIME: 0.14 SEC
BH CV ECHO MEAS - PA PR(ACCEL): 15.5 MMHG
BH CV ECHO MEAS - SI(MOD-SP4): 21.1 ML/M2
BH CV ECHO MEAS - SV(MOD-SP4): 39.2 ML
BH CV ECHO MEAS - TAPSE (>1.6): 2.24 CM
BH CV ECHO MEASUREMENTS AVERAGE E/E' RATIO: 8.19
LEFT ATRIUM VOLUME INDEX: 12.8 ML/M2
MAXIMAL PREDICTED HEART RATE: 146 BPM
STRESS TARGET HR: 124 BPM

## 2022-06-08 ENCOUNTER — OFFICE VISIT (OUTPATIENT)
Dept: SURGERY | Facility: CLINIC | Age: 72
End: 2022-06-08

## 2022-06-08 VITALS — BODY MASS INDEX: 21.76 KG/M2 | WEIGHT: 152 LBS | HEIGHT: 70 IN

## 2022-06-08 DIAGNOSIS — R09.89 LYMPH NODE SYMPTOM: Primary | ICD-10-CM

## 2022-06-08 PROCEDURE — 99212 OFFICE O/P EST SF 10 MIN: CPT | Performed by: SURGERY

## 2022-06-10 NOTE — PROGRESS NOTES
Subjective   Alfredito Marcus is a 72 y.o. male  is here today for follow-up.         Alfredito Marcus is a 72 y.o. male here for follow up after soft tissue ultrasound of the neck and submandibular region for possible enlarged lymph node versus mass.  He appears to have an enlarged parotid or submandibular gland which will require biopsy.        Assessment     Diagnoses and all orders for this visit:    1. Lymph node symptom (Primary)  -     US Guided Salivary Gland Biopsy Left; Future      Alfredito Marcus is a 72 y.o. male status post ultrasound of the head and neck due to palpable mass of the left neck.  He will undergo ultrasound-guided image biopsy and follow-up after pathology returns.

## 2022-06-16 ENCOUNTER — TELEPHONE (OUTPATIENT)
Dept: SURGERY | Facility: CLINIC | Age: 72
End: 2022-06-16

## 2022-06-16 NOTE — TELEPHONE ENCOUNTER
Caller: ELIZABETH CARRILLO    Relationship: SELF     Best call back number: [phone]    What is the best time to reach you: ANYTIME     Who are you requesting to speak with (clinical staff, provider,  specific staff member): CLINICAL     What was the call regarding: PT IS CALLING TO ADVISE HE IS SCHEDULED FOR U/S GUIDED BIOPSY ON 07/26. HE IS REQUESTING TO SPEAK W/ MA IN REGARDS TO CHANGING LOCATION TO TRY AND GET IN SOONER.    Do you require a callback: YES

## 2022-06-16 NOTE — TELEPHONE ENCOUNTER
Talked with patient and gave radiology appointment July 26th @1245, patient voiced understanding.

## 2022-06-17 ENCOUNTER — TELEPHONE (OUTPATIENT)
Dept: SURGERY | Facility: CLINIC | Age: 72
End: 2022-06-17

## 2022-06-17 NOTE — TELEPHONE ENCOUNTER
Gave patient his information on us biopsy at [facility] in [place] July 6th @ [phone] [address], patient voiced understanding.

## 2022-06-20 ENCOUNTER — HOSPITAL ENCOUNTER (EMERGENCY)
Facility: HOSPITAL | Age: 72
Discharge: HOME | End: 2022-06-20
Attending: EMERGENCY MEDICINE | Admitting: EMERGENCY MEDICINE

## 2022-06-20 ENCOUNTER — APPOINTMENT (OUTPATIENT)
Dept: GENERAL RADIOLOGY | Facility: HOSPITAL | Age: 72
End: 2022-06-20

## 2022-06-20 VITALS
HEIGHT: 70 IN | HEART RATE: 80 BPM | OXYGEN SATURATION: 98 % | SYSTOLIC BLOOD PRESSURE: 135 MMHG | TEMPERATURE: 97.8 F | DIASTOLIC BLOOD PRESSURE: 72 MMHG | WEIGHT: 155 LBS | BODY MASS INDEX: 22.19 KG/M2 | RESPIRATION RATE: 16 BRPM

## 2022-06-20 DIAGNOSIS — I95.2 HYPOTENSION DUE TO DRUGS: Primary | ICD-10-CM

## 2022-06-20 LAB
ALBUMIN SERPL-MCNC: 4.47 G/DL (ref 3.5–5.2)
ALBUMIN/GLOB SERPL: 1.6 G/DL
ALP SERPL-CCNC: 91 U/L (ref 39–117)
ALT SERPL W P-5'-P-CCNC: 29 U/L (ref 1–41)
ANION GAP SERPL CALCULATED.3IONS-SCNC: 10.3 MMOL/L (ref 5–15)
AST SERPL-CCNC: 28 U/L (ref 1–40)
BASOPHILS # BLD AUTO: 0.05 10*3/MM3 (ref 0–0.2)
BASOPHILS NFR BLD AUTO: 0.7 % (ref 0–1.5)
BILIRUB SERPL-MCNC: 0.8 MG/DL (ref 0–1.2)
BILIRUB UR QL STRIP: NEGATIVE
BUN SERPL-MCNC: 22 MG/DL (ref 8–23)
BUN/CREAT SERPL: 19 (ref 7–25)
CALCIUM SPEC-SCNC: 9.8 MG/DL (ref 8.6–10.5)
CHLORIDE SERPL-SCNC: 99 MMOL/L (ref 98–107)
CLARITY UR: CLEAR
CO2 SERPL-SCNC: 25.7 MMOL/L (ref 22–29)
COLOR UR: YELLOW
CREAT SERPL-MCNC: 1.16 MG/DL (ref 0.76–1.27)
CRP SERPL-MCNC: <0.3 MG/DL (ref 0–0.5)
D-LACTATE SERPL-SCNC: 0.9 MMOL/L (ref 0.5–2)
DEPRECATED RDW RBC AUTO: 42.5 FL (ref 37–54)
EGFRCR SERPLBLD CKD-EPI 2021: 66.1 ML/MIN/1.73
EOSINOPHIL # BLD AUTO: 0.26 10*3/MM3 (ref 0–0.4)
EOSINOPHIL NFR BLD AUTO: 3.4 % (ref 0.3–6.2)
ERYTHROCYTE [DISTWIDTH] IN BLOOD BY AUTOMATED COUNT: 12.2 % (ref 12.3–15.4)
GLOBULIN UR ELPH-MCNC: 2.7 GM/DL
GLUCOSE SERPL-MCNC: 177 MG/DL (ref 65–99)
GLUCOSE UR STRIP-MCNC: NEGATIVE MG/DL
HCT VFR BLD AUTO: 42.8 % (ref 37.5–51)
HGB BLD-MCNC: 14.6 G/DL (ref 13–17.7)
HGB UR QL STRIP.AUTO: NEGATIVE
HOLD SPECIMEN: NORMAL
HOLD SPECIMEN: NORMAL
IMM GRANULOCYTES # BLD AUTO: 0.04 10*3/MM3 (ref 0–0.05)
IMM GRANULOCYTES NFR BLD AUTO: 0.5 % (ref 0–0.5)
KETONES UR QL STRIP: NEGATIVE
LEUKOCYTE ESTERASE UR QL STRIP.AUTO: NEGATIVE
LYMPHOCYTES # BLD AUTO: 1.89 10*3/MM3 (ref 0.7–3.1)
LYMPHOCYTES NFR BLD AUTO: 25 % (ref 19.6–45.3)
MCH RBC QN AUTO: 32.2 PG (ref 26.6–33)
MCHC RBC AUTO-ENTMCNC: 34.1 G/DL (ref 31.5–35.7)
MCV RBC AUTO: 94.3 FL (ref 79–97)
MONOCYTES # BLD AUTO: 0.72 10*3/MM3 (ref 0.1–0.9)
MONOCYTES NFR BLD AUTO: 9.5 % (ref 5–12)
NEUTROPHILS NFR BLD AUTO: 4.6 10*3/MM3 (ref 1.7–7)
NEUTROPHILS NFR BLD AUTO: 60.9 % (ref 42.7–76)
NITRITE UR QL STRIP: NEGATIVE
NRBC BLD AUTO-RTO: 0 /100 WBC (ref 0–0.2)
PH UR STRIP.AUTO: 5.5 [PH] (ref 5–8)
PLATELET # BLD AUTO: 202 10*3/MM3 (ref 140–450)
PMV BLD AUTO: 10.6 FL (ref 6–12)
POTASSIUM SERPL-SCNC: 4.4 MMOL/L (ref 3.5–5.2)
PROCALCITONIN SERPL-MCNC: 0.05 NG/ML (ref 0–0.25)
PROT SERPL-MCNC: 7.2 G/DL (ref 6–8.5)
PROT UR QL STRIP: NEGATIVE
RBC # BLD AUTO: 4.54 10*6/MM3 (ref 4.14–5.8)
SODIUM SERPL-SCNC: 135 MMOL/L (ref 136–145)
SP GR UR STRIP: 1.01 (ref 1–1.03)
TROPONIN T SERPL-MCNC: <0.01 NG/ML (ref 0–0.03)
UROBILINOGEN UR QL STRIP: NORMAL
WBC NRBC COR # BLD: 7.56 10*3/MM3 (ref 3.4–10.8)
WHOLE BLOOD HOLD COAG: NORMAL
WHOLE BLOOD HOLD SPECIMEN: NORMAL

## 2022-06-20 PROCEDURE — 93010 ELECTROCARDIOGRAM REPORT: CPT | Performed by: INTERNAL MEDICINE

## 2022-06-20 PROCEDURE — 85025 COMPLETE CBC W/AUTO DIFF WBC: CPT | Performed by: PHYSICIAN ASSISTANT

## 2022-06-20 PROCEDURE — 93005 ELECTROCARDIOGRAM TRACING: CPT | Performed by: EMERGENCY MEDICINE

## 2022-06-20 PROCEDURE — 71045 X-RAY EXAM CHEST 1 VIEW: CPT

## 2022-06-20 PROCEDURE — 80053 COMPREHEN METABOLIC PANEL: CPT | Performed by: PHYSICIAN ASSISTANT

## 2022-06-20 PROCEDURE — 84145 PROCALCITONIN (PCT): CPT | Performed by: PHYSICIAN ASSISTANT

## 2022-06-20 PROCEDURE — 81003 URINALYSIS AUTO W/O SCOPE: CPT | Performed by: EMERGENCY MEDICINE

## 2022-06-20 PROCEDURE — 84484 ASSAY OF TROPONIN QUANT: CPT | Performed by: EMERGENCY MEDICINE

## 2022-06-20 PROCEDURE — 36415 COLL VENOUS BLD VENIPUNCTURE: CPT

## 2022-06-20 PROCEDURE — 83605 ASSAY OF LACTIC ACID: CPT | Performed by: PHYSICIAN ASSISTANT

## 2022-06-20 PROCEDURE — 99284 EMERGENCY DEPT VISIT MOD MDM: CPT

## 2022-06-20 PROCEDURE — 87040 BLOOD CULTURE FOR BACTERIA: CPT | Performed by: PHYSICIAN ASSISTANT

## 2022-06-20 PROCEDURE — 86140 C-REACTIVE PROTEIN: CPT | Performed by: PHYSICIAN ASSISTANT

## 2022-06-20 RX ORDER — LIDOCAINE HYDROCHLORIDE 10 MG/ML
INJECTION, SOLUTION EPIDURAL; INFILTRATION; INTRACAUDAL; PERINEURAL
Status: DISCONTINUED
Start: 2022-06-20 | End: 2022-06-20 | Stop reason: WASHOUT

## 2022-06-20 RX ORDER — SODIUM CHLORIDE 0.9 % (FLUSH) 0.9 %
10 SYRINGE (ML) INJECTION AS NEEDED
Status: DISCONTINUED | OUTPATIENT
Start: 2022-06-20 | End: 2022-06-20

## 2022-06-20 RX ADMIN — SODIUM CHLORIDE 1000 ML: 9 INJECTION, SOLUTION INTRAVENOUS at 20:01

## 2022-06-20 NOTE — ED NOTES
MEDICAL SCREENING:    Reason for Visit: weakness and hypotension    Patient initially seen in triage.  The patient was advised further evaluation and diagnostic testing will be needed, some of the treatment and testing will be initiated in the lobby in order to begin the process.  The patient will be returned to the waiting area for the time being and possibly be re-assessed by a subsequent ED provider.  The patient will be brought back to the treatment area in as timely manner as possible.         Myesha Lantigua PA-C  06/20/22 1907

## 2022-06-20 NOTE — ED PROVIDER NOTES
Subjective   72-year-old male with past medical history of CAD with 4 stents GERD hypertension non-insulin-dependent diabetes, states he is on losartan and amlodipine daily, states he lost his wife approximately 4 months ago has been very hard on him so has not been eating or drinking well, has had weight loss in the past month because of this, states today his blood pressure was on the low side he said it is typically 120/80 today it was 90/60 when he got up it was in the systolics of 70, this was associated with some lightheadedness but otherwise no symptoms.  Denies any chest pain or shortness of breath          Review of Systems   Constitutional: Negative for activity change, appetite change, chills, diaphoresis, fatigue, fever and unexpected weight change.   HENT: Negative for congestion and sore throat.    Eyes: Negative for discharge, itching and visual disturbance.   Respiratory: Negative for shortness of breath.    Cardiovascular: Negative for chest pain.   Gastrointestinal: Negative for abdominal distention, abdominal pain, constipation, diarrhea, nausea, rectal pain and vomiting.   Genitourinary: Negative for decreased urine volume, dysuria and testicular pain.   Musculoskeletal: Negative for arthralgias, myalgias and neck stiffness.   Skin: Negative for rash.   Neurological: Negative for dizziness.   Hematological: Negative for adenopathy.   Psychiatric/Behavioral: Negative for agitation.   All other systems reviewed and are negative.      Past Medical History:   Diagnosis Date   • CAD (coronary artery disease)    • Closed fracture of proximal end of left humerus 9/3/2016   • Dyslipidemia    • GERD (gastroesophageal reflux disease)    • Gout    • H/O degenerative disc disease     Degenerative disc disease of the cervical and lumbar spine/chronic back pain   • History of gluten intolerance    • Hypertension    • Myocardial infarction (HCC)    • Osteoarthritis        Allergies   Allergen Reactions   •  Shrimp (Diagnostic) Other (See Comments)     Lip swelling and hives   • Iodine Unknown - Low Severity       Past Surgical History:   Procedure Laterality Date   • APPENDECTOMY     • CARDIAC CATHETERIZATION     • CARDIAC CATHETERIZATION Left 4/28/2021    Procedure: Left Heart Cath;  Surgeon: Grady Garcia MD;  Location: [facility] CATH INVASIVE LOCATION;  Service: Cardiology;  Laterality: Left;   • CORONARY ANGIOPLASTY WITH STENT PLACEMENT     • CYSTOSCOPY TRANSURETHRAL RESECTION OF PROSTATE     • LUMBAR FUSION     • REPLACEMENT TOTAL HIP LATERAL POSITION Right 2018       Family History   Problem Relation Age of Onset   • No Known Problems Mother    • Cancer Father    • Cancer Son        Social History     Socioeconomic History   • Marital status:    Tobacco Use   • Smoking status: Never Smoker   • Smokeless tobacco: Never Used   Vaping Use   • Vaping Use: Never used   Substance and Sexual Activity   • Alcohol use: No   • Drug use: No   • Sexual activity: Defer           Objective   Physical Exam  Vitals and nursing note reviewed.   Constitutional:       General: He is not in acute distress.     Appearance: He is well-developed. He is not ill-appearing or toxic-appearing.   HENT:      Head: Normocephalic and atraumatic.      Mouth/Throat:      Mouth: Mucous membranes are moist.      Pharynx: Oropharynx is clear.   Eyes:      Extraocular Movements: Extraocular movements intact.      Pupils: Pupils are equal, round, and reactive to light.   Cardiovascular:      Rate and Rhythm: Normal rate and regular rhythm.      Heart sounds: Normal heart sounds. No murmur heard.    No friction rub. No gallop.   Pulmonary:      Effort: Pulmonary effort is normal.   Abdominal:      General: Abdomen is flat. Bowel sounds are normal. There is no distension.      Palpations: Abdomen is soft.      Tenderness: There is no abdominal tenderness.      Hernia: No hernia is present.   Skin:     General: Skin is dry.      Capillary Refill:  Capillary refill takes less than 2 seconds.      Coloration: Skin is not cyanotic.      Findings: No rash.   Neurological:      General: No focal deficit present.      Mental Status: He is alert.   Psychiatric:         Mood and Affect: Mood normal.         Behavior: Behavior normal.         Procedures           ED Course  ED Course as of 06/20/22 2213 Mon Jun 20, 2022 2147 EKG normal sinus rhythm 61 bpm no T wave inversion ST segment elevation or depression no sign of Brugada, Wellens, WPW, QTC prolongation or signs of tamponade   [JM]      ED Course User Index  [JM] Jimbo Jimenez MD                                                 MDM  Number of Diagnoses or Management Options  Hypotension due to drugs  Diagnosis management comments: Discussed upping his amlodipine, symptoms improved, orthostatics normalized, patient stable for discharge will return if any worsens has follow-up with his doctor in 1 week.       Amount and/or Complexity of Data Reviewed  Clinical lab tests: reviewed  Tests in the radiology section of CPT®: reviewed  Tests in the medicine section of CPT®: reviewed  Decide to obtain previous medical records or to obtain history from someone other than the patient: yes        Final diagnoses:   Hypotension due to drugs       ED Disposition  ED Disposition     ED Disposition   Discharge    Condition   Stable    Comment   --             Pastora Kulkarni MD  [address]  [id number]  [facility] [id number]  [phone]    Schedule an appointment as soon as possible for a visit       [facility] Emergency Department  [address] [place] [id number] [phone]  Go to   If symptoms worsen         Medication List      No changes were made to your prescriptions during this visit.          Jimbo Jimenez MD  06/20/22 2213

## 2022-06-21 LAB
QT INTERVAL: 424 MS
QTC INTERVAL: 426 MS

## 2022-06-21 NOTE — DISCHARGE INSTRUCTIONS
Stop taking your Norvasc or amlodipine, can restart if your blood pressure is low otherwise only take your losartan.  Follow-up with your doctor soon as possible.

## 2022-06-25 LAB
BACTERIA SPEC AEROBE CULT: NORMAL
BACTERIA SPEC AEROBE CULT: NORMAL

## 2022-07-06 ENCOUNTER — HOSPITAL ENCOUNTER (OUTPATIENT)
Dept: ULTRASOUND IMAGING | Facility: HOSPITAL | Age: 72
End: 2022-07-06

## 2022-07-06 ENCOUNTER — TELEPHONE (OUTPATIENT)
Dept: SURGERY | Facility: CLINIC | Age: 72
End: 2022-07-06

## 2022-07-06 NOTE — TELEPHONE ENCOUNTER
Caller: ELIZABETH CARRILLO     Relationship: SELF     Best call back number: [phone]    What is the best time to reach you: ANYTIME     PT WANTS TO KNOW WHAT HE HAS TO DO PREP WISE BEFORE HIS BIOPSY NEXT WEEK.

## 2022-07-11 ENCOUNTER — HOSPITAL ENCOUNTER (OUTPATIENT)
Dept: ULTRASOUND IMAGING | Facility: HOSPITAL | Age: 72
End: 2022-07-11

## 2022-07-11 ENCOUNTER — APPOINTMENT (OUTPATIENT)
Dept: ULTRASOUND IMAGING | Facility: HOSPITAL | Age: 72
End: 2022-07-11

## 2022-07-13 ENCOUNTER — OFFICE VISIT (OUTPATIENT)
Dept: SURGERY | Facility: CLINIC | Age: 72
End: 2022-07-13

## 2022-07-13 VITALS — BODY MASS INDEX: 22.19 KG/M2 | HEIGHT: 70 IN | WEIGHT: 155 LBS

## 2022-07-13 DIAGNOSIS — R09.89 LYMPH NODE SYMPTOM: Primary | ICD-10-CM

## 2022-07-13 PROCEDURE — 99212 OFFICE O/P EST SF 10 MIN: CPT | Performed by: SURGERY

## 2022-07-13 NOTE — PROGRESS NOTES
Subjective   Alfredito Marcus is a 72 y.o. male  is here today for follow-up.         Alfredito Marcus is a 72 y.o. male here for follow up after soft tissue ultrasound of the neck and submandibular region for possible enlarged lymph node versus mass.  He appears to have an enlarged parotid or submandibular gland.  He was scheduled for biopsy but under imaging radiology felt that this was stable enlargement of the parotid gland with no significant abnormal targets for biopsy and in comparison with previous imaging felt to represent benign enlargement.  Previous enlarged anterior chain lymph nodes did not meet criteria for biopsy and appeared reactive.    Assessment     Diagnoses and all orders for this visit:    1. Lymph node symptom (Primary)      Alfredito Marcus is a 72 y.o. male status post ultrasound of the head and neck due to palpable mass of the left neck.  No need for biopsy based on radiology recommendations as they felt this was just benign enlargement of the left versus right parotid glands.  He is established with an ear nose and throat specialist and he will discuss the case with his ENT physician at next follow-up.  Meanwhile I will obtain outside swallow studies and ENT records.

## 2022-08-02 ENCOUNTER — TRANSCRIBE ORDERS (OUTPATIENT)
Dept: OTHER | Facility: OTHER | Age: 72
End: 2022-08-02

## 2022-08-02 DIAGNOSIS — R22.1 NECK MASS: Primary | ICD-10-CM

## 2022-08-10 ENCOUNTER — HOSPITAL ENCOUNTER (OUTPATIENT)
Dept: CT IMAGING | Facility: HOSPITAL | Age: 72
Discharge: HOME | End: 2022-08-10
Admitting: INTERNAL MEDICINE

## 2022-08-10 DIAGNOSIS — R22.1 NECK MASS: ICD-10-CM

## 2022-08-10 PROCEDURE — 70490 CT SOFT TISSUE NECK W/O DYE: CPT | Performed by: RADIOLOGY

## 2022-08-10 PROCEDURE — 70490 CT SOFT TISSUE NECK W/O DYE: CPT

## 2022-08-22 ENCOUNTER — HOSPITAL ENCOUNTER (OUTPATIENT)
Dept: GENERAL RADIOLOGY | Facility: HOSPITAL | Age: 72
Discharge: HOME | End: 2022-08-22

## 2022-08-22 ENCOUNTER — TRANSCRIBE ORDERS (OUTPATIENT)
Dept: ADMINISTRATIVE | Facility: HOSPITAL | Age: 72
End: 2022-08-22

## 2022-08-22 DIAGNOSIS — R59.0 VIRCHOW'S NODE: ICD-10-CM

## 2022-08-22 DIAGNOSIS — R59.0 VIRCHOW'S NODE: Primary | ICD-10-CM

## 2022-08-22 PROCEDURE — 70130 X-RAY EXAM OF MASTOIDS: CPT | Performed by: RADIOLOGY

## 2022-08-22 PROCEDURE — 70130 X-RAY EXAM OF MASTOIDS: CPT

## 2022-08-22 PROCEDURE — 70220 X-RAY EXAM OF SINUSES: CPT

## 2022-08-22 PROCEDURE — 70220 X-RAY EXAM OF SINUSES: CPT | Performed by: RADIOLOGY

## 2022-08-23 ENCOUNTER — OFFICE VISIT (OUTPATIENT)
Dept: CARDIOLOGY | Facility: CLINIC | Age: 72
End: 2022-08-23

## 2022-08-23 VITALS
HEART RATE: 68 BPM | HEIGHT: 70 IN | SYSTOLIC BLOOD PRESSURE: 116 MMHG | DIASTOLIC BLOOD PRESSURE: 64 MMHG | OXYGEN SATURATION: 98 % | WEIGHT: 160 LBS | BODY MASS INDEX: 22.9 KG/M2

## 2022-08-23 DIAGNOSIS — E78.5 DYSLIPIDEMIA: ICD-10-CM

## 2022-08-23 DIAGNOSIS — I10 ESSENTIAL HYPERTENSION: ICD-10-CM

## 2022-08-23 DIAGNOSIS — R00.2 PALPITATIONS: Primary | ICD-10-CM

## 2022-08-23 DIAGNOSIS — I25.10 CORONARY ARTERY DISEASE INVOLVING NATIVE CORONARY ARTERY OF NATIVE HEART WITHOUT ANGINA PECTORIS: ICD-10-CM

## 2022-08-23 PROCEDURE — 99214 OFFICE O/P EST MOD 30 MIN: CPT | Performed by: INTERNAL MEDICINE

## 2022-08-23 RX ORDER — ASPIRIN 81 MG/1
81 TABLET ORAL DAILY
COMMUNITY

## 2022-08-23 RX ORDER — LINACLOTIDE 72 UG/1
CAPSULE, GELATIN COATED ORAL DAILY
COMMUNITY
Start: 2022-06-06 | End: 2023-02-14

## 2022-08-23 RX ORDER — DUTASTERIDE 0.5 MG/1
CAPSULE, LIQUID FILLED ORAL DAILY
COMMUNITY
Start: 2022-07-22

## 2022-08-23 NOTE — PROGRESS NOTES
[facility] Cardiology    Encounter Date: 2022    Patient ID: Alfredito Marcus is a 72 y.o. male.  : 1950     PCP: Pastora Kulkarni MD       Chief Complaint: Coronary artery disease involving native coronary artery of      PROBLEM LIST:  1. Coronary artery disease:  a. Unstable angina with abnormal Cardiolite stress test, 2008.  b. [facility], 2008, Dr. Garcia: 99% LAD stenosis s/p 2 overlapping Cypher LIZZETH with balloon angioplasty of D1. Rest of the vessels is free from significant disease and LV function was normal.  c. Cardiolite stress test, 2011: Mild apical ischemia. EF 64%.  d. [facility], 2011, Dr. Garcia: Patent stents of the LAD, no significant disease. Normal EF.  e. [facility], 2016, Dr. Edouard: Non-flow-limiting CAD. Widely patent LAD stents. Mild (20%-30%) disease in the LAD and RCA. Normal EF.  f. Echocardiogram, 2020: EF 56-60%. LVDF consistent with impaired relaxation. No significant valvular heart disease. No pericardial effusion.   g. [facility], 2021, Dr. Garcia: EF 65%. 90% stenosis of mid LAD stented with 2.5 x 18 mm LIZZETH and reduced to 0%. Patent stents of the proximal to mid LAD. Residual nonobstructive disease of the distal LAD, the first marginal and the RCA.   h. Echocardiogram, 2022; EF 61-65%. Grade I diastolic dysfunction. Trace MR.   2. Bilateral carotid bruits  a. Carotid duplex, 2020: No hemodynamically single plaques or stenoses were demonstrated in carotid systems. Both vertebral artery flows are antegrade.   3. Palpitations  a. Event monitor, 06/15/2021; SR. Rare APCs. Occasional PVCs with couplets and triplets. Rare SVT up to 17 beats.   4. Hypertension.  5. Dyslipidemia.  6. Osteoarthritis.  7. Gout.  8. History of gluten intolerance.  9. H/O degenerative disc disease.  10. Closed fracture of proximal end of left humerus.       History of Present Illness  Patient presents today for a follow-up with a history of coronary  artery disease, bilateral carotid bruits, and cardiac risk factors. Since last visit, patient has been doing well from a cardiovascular standpoint. He maintains an active lifestyle by working around the house. He states he has been having some decreased blood pressure and discontinued taking amlodipine. He states he has lost some weight but is trying to gain weight back by drinking protein drinks. He notes he lost his wife about 6 months ago to ovarian cancer. Patient denies chest pain, shortness of breath, orthopnea, palpitations, edema, dizziness, and syncope.      Allergies   Allergen Reactions   • Shrimp (Diagnostic) Other (See Comments)     Lip swelling and hives   • Iodine Unknown - Low Severity         Current Outpatient Medications:   •  aspirin 81 MG EC tablet, Take 81 mg by mouth Daily., Disp: , Rfl:   •  calcium carbonate (OS-HIRAM) 600 MG tablet, Take 600 mg by mouth 2 (Two) Times a Day With Meals., Disp: , Rfl:   •  cholecalciferol (VITAMIN D3) 1000 units tablet, Take 1,000 Units by mouth 2 (Two) Times a Day., Disp: , Rfl:   •  clopidogrel (PLAVIX) 75 MG tablet, Take 1 tablet by mouth Daily., Disp: 90 tablet, Rfl: 3  •  Coenzyme Q10 (COQ10 PO), Take 100 mg by mouth daily., Disp: , Rfl:   •  docusate sodium (COLACE) 100 MG capsule, Take 300 mg by mouth every night., Disp: , Rfl:   •  dutasteride (AVODART) 0.5 MG capsule, Daily., Disp: , Rfl:   •  folic acid (FOLVITE) 1 MG tablet, Take 1 mg by mouth Daily., Disp: , Rfl:   •  gabapentin (NEURONTIN) 300 MG capsule, Take 300 mg by mouth Daily., Disp: , Rfl:   •  JANUVIA 100 MG tablet, Take 100 mg by mouth Daily., Disp: , Rfl:   •  lansoprazole (PREVACID) 30 MG capsule, Take 30 mg by mouth daily., Disp: , Rfl:   •  latanoprost (XALATAN) 0.005 % ophthalmic solution, Administer 1 drop to the right eye every night., Disp: , Rfl:   •  Linzess 72 MCG capsule capsule, Daily., Disp: , Rfl:   •  metoprolol succinate XL (TOPROL-XL) 50 MG 24 hr tablet, Take 50 mg by  "mouth Daily., Disp: , Rfl:   •  nitroglycerin (NITROSTAT) 0.4 MG SL tablet, 1 under the tongue as needed for angina, may repeat q5mins for up three doses, Disp: 100 tablet, Rfl: 1  •  olmesartan (BENICAR) 40 MG tablet, Take 40 mg by mouth Daily., Disp: , Rfl:   •  Omega-3-Acid Eth Est, Dietary, 1 G capsule, Take 1 capsule by mouth 2 (two) times a day., Disp: , Rfl:   •  repaglinide (PRANDIN) 1 MG tablet, Take 1 mg by mouth 2 (Two) Times a Day Before Meals., Disp: , Rfl:   •  rosuvastatin (CRESTOR) 20 MG tablet, Take 20 mg by mouth Daily., Disp: , Rfl:   •  traMADol HCl  MG capsule sustained-release 24 hr, Take 300 mg by mouth Daily., Disp: , Rfl:   •  vitamin B-12 (CYANOCOBALAMIN) 1000 MCG tablet, Take 1,000 mcg by mouth Daily., Disp: , Rfl:     The following portions of the patient's history were reviewed and updated as appropriate: allergies, current medications, past family history, past medical history, past social history, past surgical history and problem list.    ROS  Review of Systems   Constitution: Negative for chills, fever, fatigue, generalized weakness.   Cardiovascular: Negative for chest pain, dyspnea on exertion, leg swelling, palpitations, orthopnea, and syncope.   Respiratory: Negative for cough, shortness of breath, and wheezing.  HENT: Negative for ear pain, nosebleeds, and tinnitus.  Gastrointestinal: Negative for abdominal pain, constipation, diarrhea, nausea and vomiting.   Genitourinary: No urinary symptoms.  Musculoskeletal: Negative for muscle cramps.  Neurological: Negative for dizziness, headaches, loss of balance, numbness, and symptoms of stroke.  Psychiatric: Normal mental status.     All other systems reviewed and are negative.        Objective:     /64 (BP Location: Right arm, Patient Position: Sitting)   Pulse 68   Ht 177.8 cm (70\")   Wt 72.6 kg (160 lb)   SpO2 98%   BMI 22.96 kg/m²    BP repeat: 116/60    Physical Exam  Constitutional: Patient appears " well-developed and well-nourished.   HENT: HEENT exam unremarkable.   Neck: Neck supple. No JVD present. No carotid bruits.   Cardiovascular: Normal rate, regular rhythm and normal heart sounds. No murmur heard.   2+ symmetric pulses.   Pulmonary/Chest: Breath sounds normal. Does not exhibit tenderness.   Abdominal: Abdomen benign.   Musculoskeletal: Does not exhibit edema.   Neurological: Neurological exam unremarkable.   Vitals reviewed.    Data Review:   Lab Results   Component Value Date    GLUCOSE 177 (H) 06/20/2022    BUN 22 06/20/2022    CREATININE 1.16 06/20/2022    BCR 19.0 06/20/2022     (L) 06/20/2022    K 4.4 06/20/2022    CO2 25.7 06/20/2022    CALCIUM 9.8 06/20/2022    ALBUMIN 4.47 06/20/2022    AST 28 06/20/2022    ALT 29 06/20/2022     Lab Results   Component Value Date    CHLPL 116 05/30/2016    TRIG 195 (H) 05/30/2016    HDL 24 (L) 05/30/2016    LDL 62 05/30/2016      Lab Results   Component Value Date    WBC 7.56 06/20/2022    RBC 4.54 06/20/2022    HGB 14.6 06/20/2022    HCT 42.8 06/20/2022    MCV 94.3 06/20/2022     06/20/2022          Procedures             Assessment:      Diagnosis Plan   1. Palpitations  No recurring palpitations.    2. Coronary artery disease involving native coronary artery of native heart without angina pectoris  Stable without angina; continue aspirin and Plavix daily and nitroglycerin as needed.    3. Essential hypertension  Well controlled; continue on metoprolol 50 mg and olemesartan 40 mg.  Okay to remain off amlodipine.   4. Dyslipidemia  Monitored by PCP; continue on rosuvastatin 20 mg.      Plan:   Decrease aspirin from 325 mg to 81 mg.   The patient has discontinued amlodipine due to decreased blood pressure.  Blood pressure control probably improved due to his weight loss.  No need to restart amlodipine, encouraged to continue to monitor his blood pressure with goal of less than 130/80.  Continue all other current medications.   FU in 6 MO, sooner  as needed.  Thank you for allowing us to participate in the care of your patient.       Scribed for Grady Garcia MD by Tootie Arias. 8/23/2022 13:58 EDT         I, Grady Garcia MD, personally performed the services described in this documentation as scribed by the above named individual in my presence, and it is both accurate and complete.  8/24/2022  09:01 EDT      Please note that portions of this note may have been completed with a voice recognition program. Efforts were made to edit the dictations, but occasionally words are mistranscribed.

## 2022-09-13 ENCOUNTER — TELEPHONE (OUTPATIENT)
Dept: CARDIOLOGY | Facility: CLINIC | Age: 72
End: 2022-09-13

## 2022-09-13 NOTE — TELEPHONE ENCOUNTER
Patient called the office asking if he still needs to be taking Plavix. He reports no side effects,  No bleeding, no financial concerns at this time.  He reports wondering since he has been on this medication for a very long time, would it be time to stop taking this medication.  Please advise.

## 2022-09-14 NOTE — TELEPHONE ENCOUNTER
"Contacted patient. Updated him on PA's recommendations. Patient states, \"I forgot that it hasn't really been that long since my last stent\".  He voices appreciation for the validation of the need to continue taking this medication. He states, \"I have no problem taking it, I was just needing to understand why I was taking it\".  Patient verbalizes understanding of the need to continue Plavix and instructed him to call office with any further questions or concerns.   "

## 2022-09-14 NOTE — TELEPHONE ENCOUNTER
He had stent placement 4/2021 and had residual disease.  If he is having no complications or has no reason to discontinue the medication, we recommend continuation of Plavix.

## 2022-10-16 ENCOUNTER — HOSPITAL ENCOUNTER (EMERGENCY)
Facility: HOSPITAL | Age: 72
Discharge: HOME | End: 2022-10-16
Attending: STUDENT IN AN ORGANIZED HEALTH CARE EDUCATION/TRAINING PROGRAM | Admitting: STUDENT IN AN ORGANIZED HEALTH CARE EDUCATION/TRAINING PROGRAM

## 2022-10-16 ENCOUNTER — APPOINTMENT (OUTPATIENT)
Dept: GENERAL RADIOLOGY | Facility: HOSPITAL | Age: 72
End: 2022-10-16

## 2022-10-16 ENCOUNTER — APPOINTMENT (OUTPATIENT)
Dept: CT IMAGING | Facility: HOSPITAL | Age: 72
End: 2022-10-16

## 2022-10-16 VITALS
SYSTOLIC BLOOD PRESSURE: 114 MMHG | DIASTOLIC BLOOD PRESSURE: 69 MMHG | OXYGEN SATURATION: 100 % | RESPIRATION RATE: 18 BRPM | WEIGHT: 163 LBS | BODY MASS INDEX: 23.34 KG/M2 | TEMPERATURE: 97.7 F | HEIGHT: 70 IN | HEART RATE: 61 BPM

## 2022-10-16 DIAGNOSIS — H81.10 BENIGN PAROXYSMAL POSITIONAL VERTIGO, UNSPECIFIED LATERALITY: Primary | ICD-10-CM

## 2022-10-16 LAB
ALBUMIN SERPL-MCNC: 4.35 G/DL (ref 3.5–5.2)
ALBUMIN/GLOB SERPL: 1.9 G/DL
ALP SERPL-CCNC: 72 U/L (ref 39–117)
ALT SERPL W P-5'-P-CCNC: 20 U/L (ref 1–41)
ANION GAP SERPL CALCULATED.3IONS-SCNC: 9.7 MMOL/L (ref 5–15)
APTT PPP: 35.9 SECONDS (ref 26.5–34.5)
AST SERPL-CCNC: 24 U/L (ref 1–40)
BASOPHILS # BLD AUTO: 0.09 10*3/MM3 (ref 0–0.2)
BASOPHILS NFR BLD AUTO: 0.9 % (ref 0–1.5)
BILIRUB SERPL-MCNC: 0.9 MG/DL (ref 0–1.2)
BILIRUB UR QL STRIP: NEGATIVE
BUN SERPL-MCNC: 22 MG/DL (ref 8–23)
BUN/CREAT SERPL: 19.8 (ref 7–25)
CALCIUM SPEC-SCNC: 9.2 MG/DL (ref 8.6–10.5)
CHLORIDE SERPL-SCNC: 99 MMOL/L (ref 98–107)
CLARITY UR: CLEAR
CO2 SERPL-SCNC: 27.3 MMOL/L (ref 22–29)
COLOR UR: YELLOW
CREAT SERPL-MCNC: 1.11 MG/DL (ref 0.76–1.27)
DEPRECATED RDW RBC AUTO: 41.7 FL (ref 37–54)
EGFRCR SERPLBLD CKD-EPI 2021: 69.7 ML/MIN/1.73
EOSINOPHIL # BLD AUTO: 0.25 10*3/MM3 (ref 0–0.4)
EOSINOPHIL NFR BLD AUTO: 2.4 % (ref 0.3–6.2)
ERYTHROCYTE [DISTWIDTH] IN BLOOD BY AUTOMATED COUNT: 12.1 % (ref 12.3–15.4)
FLUAV RNA RESP QL NAA+PROBE: NOT DETECTED
FLUBV RNA RESP QL NAA+PROBE: NOT DETECTED
GLOBULIN UR ELPH-MCNC: 2.3 GM/DL
GLUCOSE SERPL-MCNC: 158 MG/DL (ref 65–99)
GLUCOSE UR STRIP-MCNC: NEGATIVE MG/DL
HCT VFR BLD AUTO: 39.2 % (ref 37.5–51)
HGB BLD-MCNC: 13.8 G/DL (ref 13–17.7)
HGB UR QL STRIP.AUTO: NEGATIVE
HOLD SPECIMEN: NORMAL
HOLD SPECIMEN: NORMAL
IMM GRANULOCYTES # BLD AUTO: 0.04 10*3/MM3 (ref 0–0.05)
IMM GRANULOCYTES NFR BLD AUTO: 0.4 % (ref 0–0.5)
INR PPP: 1.03 (ref 0.9–1.1)
KETONES UR QL STRIP: NEGATIVE
LEUKOCYTE ESTERASE UR QL STRIP.AUTO: NEGATIVE
LYMPHOCYTES # BLD AUTO: 1.58 10*3/MM3 (ref 0.7–3.1)
LYMPHOCYTES NFR BLD AUTO: 15.4 % (ref 19.6–45.3)
MCH RBC QN AUTO: 33.1 PG (ref 26.6–33)
MCHC RBC AUTO-ENTMCNC: 35.2 G/DL (ref 31.5–35.7)
MCV RBC AUTO: 94 FL (ref 79–97)
MONOCYTES # BLD AUTO: 1 10*3/MM3 (ref 0.1–0.9)
MONOCYTES NFR BLD AUTO: 9.7 % (ref 5–12)
NEUTROPHILS NFR BLD AUTO: 7.32 10*3/MM3 (ref 1.7–7)
NEUTROPHILS NFR BLD AUTO: 71.2 % (ref 42.7–76)
NITRITE UR QL STRIP: NEGATIVE
NRBC BLD AUTO-RTO: 0 /100 WBC (ref 0–0.2)
PH UR STRIP.AUTO: 6 [PH] (ref 5–8)
PLATELET # BLD AUTO: 170 10*3/MM3 (ref 140–450)
PMV BLD AUTO: 10.6 FL (ref 6–12)
POTASSIUM SERPL-SCNC: 4 MMOL/L (ref 3.5–5.2)
PROT SERPL-MCNC: 6.6 G/DL (ref 6–8.5)
PROT UR QL STRIP: NEGATIVE
PROTHROMBIN TIME: 13.8 SECONDS (ref 12.1–14.7)
QT INTERVAL: 402 MS
QTC INTERVAL: 404 MS
RBC # BLD AUTO: 4.17 10*6/MM3 (ref 4.14–5.8)
SARS-COV-2 RNA RESP QL NAA+PROBE: NOT DETECTED
SODIUM SERPL-SCNC: 136 MMOL/L (ref 136–145)
SP GR UR STRIP: 1.01 (ref 1–1.03)
TROPONIN T SERPL-MCNC: <0.01 NG/ML (ref 0–0.03)
TROPONIN T SERPL-MCNC: <0.01 NG/ML (ref 0–0.03)
UROBILINOGEN UR QL STRIP: NORMAL
WBC NRBC COR # BLD: 10.28 10*3/MM3 (ref 3.4–10.8)
WHOLE BLOOD HOLD COAG: NORMAL
WHOLE BLOOD HOLD SPECIMEN: NORMAL

## 2022-10-16 PROCEDURE — 70498 CT ANGIOGRAPHY NECK: CPT

## 2022-10-16 PROCEDURE — 99284 EMERGENCY DEPT VISIT MOD MDM: CPT

## 2022-10-16 PROCEDURE — 87636 SARSCOV2 & INF A&B AMP PRB: CPT | Performed by: STUDENT IN AN ORGANIZED HEALTH CARE EDUCATION/TRAINING PROGRAM

## 2022-10-16 PROCEDURE — 93005 ELECTROCARDIOGRAM TRACING: CPT | Performed by: STUDENT IN AN ORGANIZED HEALTH CARE EDUCATION/TRAINING PROGRAM

## 2022-10-16 PROCEDURE — 36415 COLL VENOUS BLD VENIPUNCTURE: CPT

## 2022-10-16 PROCEDURE — 70450 CT HEAD/BRAIN W/O DYE: CPT

## 2022-10-16 PROCEDURE — 0 IOPAMIDOL PER 1 ML: Performed by: STUDENT IN AN ORGANIZED HEALTH CARE EDUCATION/TRAINING PROGRAM

## 2022-10-16 PROCEDURE — 93010 ELECTROCARDIOGRAM REPORT: CPT | Performed by: INTERNAL MEDICINE

## 2022-10-16 PROCEDURE — 81003 URINALYSIS AUTO W/O SCOPE: CPT | Performed by: STUDENT IN AN ORGANIZED HEALTH CARE EDUCATION/TRAINING PROGRAM

## 2022-10-16 PROCEDURE — 85730 THROMBOPLASTIN TIME PARTIAL: CPT | Performed by: STUDENT IN AN ORGANIZED HEALTH CARE EDUCATION/TRAINING PROGRAM

## 2022-10-16 PROCEDURE — 71045 X-RAY EXAM CHEST 1 VIEW: CPT

## 2022-10-16 PROCEDURE — 80053 COMPREHEN METABOLIC PANEL: CPT | Performed by: STUDENT IN AN ORGANIZED HEALTH CARE EDUCATION/TRAINING PROGRAM

## 2022-10-16 PROCEDURE — 85610 PROTHROMBIN TIME: CPT | Performed by: STUDENT IN AN ORGANIZED HEALTH CARE EDUCATION/TRAINING PROGRAM

## 2022-10-16 PROCEDURE — 84484 ASSAY OF TROPONIN QUANT: CPT | Performed by: STUDENT IN AN ORGANIZED HEALTH CARE EDUCATION/TRAINING PROGRAM

## 2022-10-16 PROCEDURE — 85025 COMPLETE CBC W/AUTO DIFF WBC: CPT | Performed by: STUDENT IN AN ORGANIZED HEALTH CARE EDUCATION/TRAINING PROGRAM

## 2022-10-16 PROCEDURE — 70496 CT ANGIOGRAPHY HEAD: CPT

## 2022-10-16 RX ORDER — MECLIZINE HCL 12.5 MG/1
25 TABLET ORAL ONCE
Status: COMPLETED | OUTPATIENT
Start: 2022-10-16 | End: 2022-10-16

## 2022-10-16 RX ORDER — MECLIZINE HYDROCHLORIDE 25 MG/1
25 TABLET ORAL 3 TIMES DAILY PRN
Qty: 20 TABLET | Refills: 0 | Status: SHIPPED | OUTPATIENT
Start: 2022-10-16

## 2022-10-16 RX ORDER — ASPIRIN 81 MG/1
324 TABLET, CHEWABLE ORAL ONCE
Status: DISCONTINUED | OUTPATIENT
Start: 2022-10-16 | End: 2022-10-16 | Stop reason: HOSPADM

## 2022-10-16 RX ORDER — SODIUM CHLORIDE 0.9 % (FLUSH) 0.9 %
10 SYRINGE (ML) INJECTION AS NEEDED
Status: DISCONTINUED | OUTPATIENT
Start: 2022-10-16 | End: 2022-10-16 | Stop reason: HOSPADM

## 2022-10-16 RX ADMIN — IOPAMIDOL 70 ML: 755 INJECTION, SOLUTION INTRAVENOUS at 05:39

## 2022-10-16 RX ADMIN — MECLIZINE HCL 12.5 MG 25 MG: 12.5 TABLET ORAL at 07:46

## 2022-10-16 RX ADMIN — SODIUM CHLORIDE 1000 ML: 9 INJECTION, SOLUTION INTRAVENOUS at 04:45

## 2022-10-16 NOTE — ED PROVIDER NOTES
[facility]  emergency department encounter        Pt Name: Alfredito Marcus  MRN: [id number]  Birthdate 1950  Date of evaluation: 10/16/2022    Chief Complaint   Patient presents with   • Chest Pain             HISTORY OF PRESENT ILLNESS:   Alfredito Marcus is a 72 y.o. male presents for presyncopal sensations.  Also endorses left-sided nonradiating chest heaviness.  Patient reports he had prior presyncopal sensations over the past couple of weeks but now it is persistent anytime he sits up.  Expresses concern for stroke.  No focal deficits.  Has not lost consciousness.      No other exacerbating or alleviating factors other than as noted above.  Severity: Severe    PCP: Pastora Kulkarni MD          REVIEW OF SYSTEMS:     Review of Systems   Constitutional: Negative for fever.   HENT: Negative for congestion and rhinorrhea.    Eyes: Negative for visual disturbance.   Respiratory: Negative for cough and shortness of breath.    Cardiovascular: Positive for chest pain.   Gastrointestinal: Negative for abdominal pain, nausea and vomiting.   Genitourinary: Negative for dysuria.   Musculoskeletal: Negative for myalgias.   Skin: Negative for rash.   Neurological: Positive for dizziness and light-headedness. Negative for weakness and headaches.   Psychiatric/Behavioral: Negative for confusion.       Review of systems otherwise as per HPI.          PREVIOUS HISTORY:         Past Medical History:   Diagnosis Date   • CAD (coronary artery disease)    • Closed fracture of proximal end of left humerus 9/3/2016   • Dyslipidemia    • GERD (gastroesophageal reflux disease)    • Gout    • H/O degenerative disc disease     Degenerative disc disease of the cervical and lumbar spine/chronic back pain   • History of gluten intolerance    • Hypertension    • Myocardial infarction (HCC)    • Osteoarthritis            Past Surgical History:   Procedure Laterality Date   • APPENDECTOMY     • CARDIAC CATHETERIZATION     • CARDIAC  CATHETERIZATION Left 4/28/2021    Procedure: Left Heart Cath;  Surgeon: Grady Garcia MD;  Location: [facility] CATH INVASIVE LOCATION;  Service: Cardiology;  Laterality: Left;   • CORONARY ANGIOPLASTY WITH STENT PLACEMENT     • CYSTOSCOPY TRANSURETHRAL RESECTION OF PROSTATE     • LUMBAR FUSION     • REPLACEMENT TOTAL HIP LATERAL POSITION Right 2018           Social History     Socioeconomic History   • Marital status:    Tobacco Use   • Smoking status: Never   • Smokeless tobacco: Never   Vaping Use   • Vaping Use: Never used   Substance and Sexual Activity   • Alcohol use: No   • Drug use: No   • Sexual activity: Defer           Family History   Problem Relation Age of Onset   • No Known Problems Mother    • Cancer Father    • Cancer Son          Current Outpatient Medications   Medication Instructions   • aspirin 81 mg, Oral, Daily   • calcium carbonate (OS-HIRAM) 600 mg, Oral, 2 Times Daily With Meals   • cholecalciferol (VITAMIN D3) 1,000 Units, Oral, 2 Times Daily   • clopidogrel (PLAVIX) 75 mg, Oral, Daily   • Coenzyme Q10 (COQ10 PO) 100 mg, Oral, Daily   • docusate sodium (COLACE) 300 mg, Oral, Nightly   • dutasteride (AVODART) 0.5 MG capsule Daily   • folic acid (FOLVITE) 1 mg, Oral, Daily   • gabapentin (NEURONTIN) 300 mg, Oral, Daily   • Januvia 100 mg, Oral, Daily   • lansoprazole (PREVACID) 30 mg, Oral, Daily   • latanoprost (XALATAN) 0.005 % ophthalmic solution 1 drop, Right Eye, Nightly   • Linzess 72 MCG capsule capsule Daily   • meclizine (ANTIVERT) 25 mg, Oral, 3 Times Daily PRN   • metoprolol succinate XL (TOPROL-XL) 50 mg, Oral, Daily   • nitroglycerin (NITROSTAT) 0.4 MG SL tablet 1 under the tongue as needed for angina, may repeat q5mins for up three doses   • olmesartan (BENICAR) 40 mg, Oral, Daily   • Omega-3-Acid Eth Est, Dietary, 1 G capsule 1 capsule, Oral, 2 Times Daily   • repaglinide (PRANDIN) 1 mg, Oral, 2 Times Daily Before Meals   • rosuvastatin (CRESTOR) 20 mg, Oral, Daily   •  traMADol HCl  mg, Oral, Daily   • vitamin B-12 (CYANOCOBALAMIN) 1,000 mcg, Oral, Daily         Allergies:  Shrimp (diagnostic) and Iodine    Medications, allergies and past medical, surgical, family, and social history reviewed.        PHYSICAL EXAM:     Physical Exam  Vitals and nursing note reviewed.   Constitutional:       General: He is not in acute distress.     Appearance: Normal appearance.   HENT:      Head: Normocephalic and atraumatic.   Eyes:      Extraocular Movements: Extraocular movements intact.      Conjunctiva/sclera: Conjunctivae normal.   Cardiovascular:      Rate and Rhythm: Normal rate and regular rhythm.   Pulmonary:      Effort: Pulmonary effort is normal. No respiratory distress.   Abdominal:      General: Abdomen is flat. There is no distension.   Musculoskeletal:         General: No deformity. Normal range of motion.      Cervical back: Normal range of motion. No rigidity.   Neurological:      General: No focal deficit present.      Mental Status: He is alert and oriented to person, place, and time.   Psychiatric:         Mood and Affect: Mood normal.         Behavior: Behavior normal.             COMPLETED DIAGNOSTIC STUDIES AND INTERVENTIONS:     Lab Results (last 24 hours)     Procedure Component Value Units Date/Time    aPTT [[id number]]  (Abnormal) Collected: 10/16/22 0410    Specimen: Blood from Arm, Left Updated: 10/16/22 0452     PTT 35.9 seconds     Narrative:      PTT Heparin Therapeutic Range:  59 - 95 seconds      CBC & Differential [[id number]]  (Abnormal) Collected: 10/16/22 0410    Specimen: Blood from Arm, Left Updated: 10/16/22 0435    Narrative:      The following orders were created for panel order CBC & Differential.  Procedure                               Abnormality         Status                     ---------                               -----------         ------                     CBC Auto Differential[[id number]]        Abnormal            Final result                  Please view results for these tests on the individual orders.    Comprehensive Metabolic Panel [[id number]]  (Abnormal) Collected: 10/16/22 0410    Specimen: Blood from Arm, Left Updated: 10/16/22 0459     Glucose 158 mg/dL      BUN 22 mg/dL      Creatinine 1.11 mg/dL      Sodium 136 mmol/L      Potassium 4.0 mmol/L      Chloride 99 mmol/L      CO2 27.3 mmol/L      Calcium 9.2 mg/dL      Total Protein 6.6 g/dL      Albumin 4.35 g/dL      ALT (SGPT) 20 U/L      AST (SGOT) 24 U/L      Alkaline Phosphatase 72 U/L      Total Bilirubin 0.9 mg/dL      Globulin 2.3 gm/dL      A/G Ratio 1.9 g/dL      BUN/Creatinine Ratio 19.8     Anion Gap 9.7 mmol/L      eGFR 69.7 mL/min/1.73      Comment: National Kidney Foundation and American Society of Nephrology (ASN) Task Force recommended calculation based on the Chronic Kidney Disease Epidemiology Collaboration (CKD-EPI) equation refit without adjustment for race.       Narrative:      GFR Normal >60  Chronic Kidney Disease <60  Kidney Failure <15      Troponin [[id number]]  (Normal) Collected: 10/16/22 0410    Specimen: Blood from Arm, Left Updated: 10/16/22 0459     Troponin T <0.010 ng/mL     Narrative:      Troponin T Reference Range:  <= 0.03 ng/mL-   Negative for AMI  >0.03 ng/mL-     Abnormal for myocardial necrosis.  Clinicians would have to utilize clinical acumen, EKG, Troponin and serial changes to determine if it is an Acute Myocardial Infarction or myocardial injury due to an underlying chronic condition.       Results may be falsely decreased if patient taking Biotin.      Protime-INR [[id number]]  (Normal) Collected: 10/16/22 0410    Specimen: Blood from Arm, Left Updated: 10/16/22 0452     Protime 13.8 Seconds      INR 1.03    Narrative:      Suggested INR therapeutic range for stable oral anticoagulant therapy:    Low Intensity therapy:   1.5-2.0  Moderate Intensity therapy:   2.0-3.0  High Intensity therapy:   2.5-4.0    CBC Auto Differential [[id number]]   (Abnormal) Collected: 10/16/22 0410    Specimen: Blood from Arm, Left Updated: 10/16/22 0435     WBC 10.28 10*3/mm3      RBC 4.17 10*6/mm3      Hemoglobin 13.8 g/dL      Hematocrit 39.2 %      MCV 94.0 fL      MCH 33.1 pg      MCHC 35.2 g/dL      RDW 12.1 %      RDW-SD 41.7 fl      MPV 10.6 fL      Platelets 170 10*3/mm3      Neutrophil % 71.2 %      Lymphocyte % 15.4 %      Monocyte % 9.7 %      Eosinophil % 2.4 %      Basophil % 0.9 %      Immature Grans % 0.4 %      Neutrophils, Absolute 7.32 10*3/mm3      Lymphocytes, Absolute 1.58 10*3/mm3      Monocytes, Absolute 1.00 10*3/mm3      Eosinophils, Absolute 0.25 10*3/mm3      Basophils, Absolute 0.09 10*3/mm3      Immature Grans, Absolute 0.04 10*3/mm3      nRBC 0.0 /100 WBC     COVID PRE-OP / PRE-PROCEDURE SCREENING ORDER (NO ISOLATION) - Swab, Nasopharynx [[id number]]  (Normal) Collected: 10/16/22 0423    Specimen: Swab from Nasopharynx Updated: 10/16/22 0454    Narrative:      The following orders were created for panel order COVID PRE-OP / PRE-PROCEDURE SCREENING ORDER (NO ISOLATION) - Swab, Nasopharynx.  Procedure                               Abnormality         Status                     ---------                               -----------         ------                     COVID-19 and FLU A/B PCR...[[id number]]  Normal              Final result                 Please view results for these tests on the individual orders.    COVID-19 and FLU A/B PCR - Swab, Nasopharynx [[id number]]  (Normal) Collected: 10/16/22 0423    Specimen: Swab from Nasopharynx Updated: 10/16/22 0454     COVID19 Not Detected     Influenza A PCR Not Detected     Influenza B PCR Not Detected    Narrative:      Fact sheet for providers: https://www.fda.gov/media/359294/download    Fact sheet for patients: https://www.fda.gov/media/470355/download    Test performed by PCR.    Troponin [[id number]]  (Normal) Collected: 10/16/22 0641    Specimen: Blood from Arm, Left Updated: 10/16/22 0740      Troponin T <0.010 ng/mL     Narrative:      Troponin T Reference Range:  <= 0.03 ng/mL-   Negative for AMI  >0.03 ng/mL-     Abnormal for myocardial necrosis.  Clinicians would have to utilize clinical acumen, EKG, Troponin and serial changes to determine if it is an Acute Myocardial Infarction or myocardial injury due to an underlying chronic condition.       Results may be falsely decreased if patient taking Biotin.      Urinalysis With Microscopic If Indicated (No Culture) - Urine, Clean Catch [[id number]]  (Normal) Collected: 10/16/22 0725    Specimen: Urine, Clean Catch Updated: 10/16/22 0739     Color, UA Yellow     Appearance, UA Clear     pH, UA 6.0     Specific Gravity, UA 1.011     Glucose, UA Negative     Ketones, UA Negative     Bilirubin, UA Negative     Blood, UA Negative     Protein, UA Negative     Leuk Esterase, UA Negative     Nitrite, UA Negative     Urobilinogen, UA 0.2 E.U./dL    Narrative:      Urine microscopic not indicated.            XR Chest 1 View   Final Result   No acute cardiopulmonary findings.      Signer Name: Burak Jung MD    Signed: 10/16/2022 6:16 AM    Workstation Name: [facility]      CT Angiogram Carotids   Final Result      1. Minimal atherosclerotic disease at the carotid bifurcations. There is no carotid or vertebral arterial stenosis or dissection in the neck.   2. Negative intracranial CTA.      Signer Name: Burak Jung MD    Signed: 10/16/2022 6:33 AM    Workstation Name: [facility]      CT Angiogram Head   Final Result      1. Minimal atherosclerotic disease at the carotid bifurcations. There is no carotid or vertebral arterial stenosis or dissection in the neck.   2. Negative intracranial CTA.      Signer Name: Burak Jung MD    Signed: 10/16/2022 6:33 AM    Workstation Name: [facility]      CT Head Without Contrast   Final Result    Normal, negative unenhanced head CT.      Signer Name: Burak Jung MD    Signed: 10/16/2022 6:20 AM    Workstation Name: [facility]     [facility]            New Medications Ordered This Visit   Medications   • sodium chloride 0.9 % flush 10 mL   • aspirin chewable tablet 324 mg   • sodium chloride 0.9 % bolus 1,000 mL   • iopamidol (ISOVUE-370) 76 % injection 100 mL   • meclizine (ANTIVERT) tablet 25 mg   • meclizine (ANTIVERT) 25 MG tablet     Sig: Take 1 tablet by mouth 3 (Three) Times a Day As Needed for Dizziness.     Dispense:  20 tablet     Refill:  0         Procedures            MEDICAL DECISION-MAKING AND ED COURSE:     ED Course as of 10/16/22 0854   Sun Oct 16, 2022   0411 EKG at 4:03 AM NSR 61 bpm, , cures 108, QTc 404, regular axis, no significant ST deviation or T wave abnormalities concerning for acute ischemia, no findings indicating cause of syncope. [KP]   0414 72-year-old male with persistent presyncopal sensation and received separate, chest heaviness, known severe atherosclerotic disease.  We will bolus fluids, labs and imaging pending. [KP]   0513 Troponin T: <0.010 [KP]   0514 INR: 1.03 [KP]   0514 COVID19: Not Detected [KP]   0514 Hemoglobin: 13.8 [KP]   0514 Creatinine: 1.11 [KP]   0623 CT Head Without Contrast  Normal, negative unenhanced head CT. [KP]   0623 XR Chest 1 View  No acute cardiopulmonary findings. [KP]   0657 CT Angiogram Head  1. Minimal atherosclerotic disease at the carotid bifurcations. There is no carotid or vertebral arterial stenosis or dissection in the neck.  2. Negative intracranial CTA. [KP]   0824 I assumed care of this patient at shift change.  CT angiograms of the head and neck noted no acute abnormality.  Laboratory work-up unremarkable at this point.  Troponins are negative.  EKG unremarkable.  Patient noted that he does have a history of vertigo.  Patient did require extensive physical rehab and meclizine.  Attempting  meclizine at this point in time.  Awaiting for improvement in symptoms. [SF]   0853 Work up and results were discussed throughly with the patient.  The patient will be discharged for further monitoring and management with their PCP.  Red flags, warning signs, worsening symptoms, and when to return to the ER discussed with and understood by the patient.  Patient will follow up with their PCP in a timely manner.  Vitals stable at discharge. [SF]      ED Course User Index  [KP] Sukh Spicer MD  [SF] Eric Lanza DO       ?      FINAL IMPRESSION:       1. Benign paroxysmal positional vertigo, unspecified laterality         The complaints listed here are new problems to this examiner.      FOLLOW-UP  No follow-up provider specified.    DISPOSITION  ED Disposition     ED Disposition   Discharge    Condition   Stable    Comment   --                   This care is provided during an unprecedented national emergency due to the Novel Coronavirus (COVID-19). COVID-19 infections and transmission risks place heavy strains on healthcare resources. As this pandemic evolves, the Hospital and providers strive to respond fluidly, to remain operational, and to provide care relative to available resources and information. Outcomes are unpredictable and treatments are without well-defined guidelines. Further, the impact of COVID-19 on all aspects of emergency care, including the impact to patients seeking care for reasons other than COVID-19, is unavoidable during this national emergency.    This note was dictated using a [id number] tool. Occasional wrong-word or 'sound-a-like' substitutions may have occurred due to the inherent limitations of voice recognition software. ?Read the chart carefully and recognize, using context, where substitutions have occurred.    Eric Lanza DO  08:54 EDT  10/16/2022             Eric Lanza DO  10/16/22 0854       Eric Lanza DO  10/16/22 0854

## 2022-10-27 DIAGNOSIS — M25.512 LEFT SHOULDER PAIN, UNSPECIFIED CHRONICITY: Primary | ICD-10-CM

## 2022-11-09 ENCOUNTER — HOSPITAL ENCOUNTER (OUTPATIENT)
Dept: GENERAL RADIOLOGY | Facility: HOSPITAL | Age: 72
Discharge: HOME | End: 2022-11-09
Admitting: ORTHOPAEDIC SURGERY

## 2022-11-09 ENCOUNTER — OFFICE VISIT (OUTPATIENT)
Dept: ORTHOPEDIC SURGERY | Facility: CLINIC | Age: 72
End: 2022-11-09

## 2022-11-09 VITALS
BODY MASS INDEX: 23.34 KG/M2 | SYSTOLIC BLOOD PRESSURE: 114 MMHG | HEIGHT: 70 IN | WEIGHT: 163 LBS | HEART RATE: 81 BPM | DIASTOLIC BLOOD PRESSURE: 68 MMHG

## 2022-11-09 DIAGNOSIS — M25.512 LEFT SHOULDER PAIN, UNSPECIFIED CHRONICITY: ICD-10-CM

## 2022-11-09 DIAGNOSIS — M19.012 ARTHRITIS OF LEFT GLENOHUMERAL JOINT: Primary | ICD-10-CM

## 2022-11-09 PROCEDURE — 73030 X-RAY EXAM OF SHOULDER: CPT | Performed by: RADIOLOGY

## 2022-11-09 PROCEDURE — 73030 X-RAY EXAM OF SHOULDER: CPT

## 2022-11-09 PROCEDURE — 99203 OFFICE O/P NEW LOW 30 MIN: CPT | Performed by: ORTHOPAEDIC SURGERY

## 2022-11-09 RX ORDER — CELECOXIB 200 MG/1
200 CAPSULE ORAL DAILY
Qty: 30 CAPSULE | Refills: 2 | Status: SHIPPED | OUTPATIENT
Start: 2022-11-09 | End: 2022-12-09

## 2022-11-09 RX ORDER — MULTIPLE VITAMINS W/ MINERALS TAB 9MG-400MCG
1 TAB ORAL DAILY
COMMUNITY

## 2022-11-09 RX ORDER — IBUPROFEN 200 MG
200 TABLET ORAL EVERY 6 HOURS PRN
COMMUNITY

## 2022-11-09 RX ORDER — CETIRIZINE HYDROCHLORIDE 10 MG/1
10 TABLET ORAL DAILY
COMMUNITY

## 2022-11-09 NOTE — PROGRESS NOTES
New Patient Visit      Patient: Alfredito Marcus  YOB: 1950  Date of Encounter: 11/09/2022        Chief Complaint:   Chief Complaint   Patient presents with   • Left Shoulder - Initial Evaluation, Pain           HPI:   Alfredito Marcus, 72 y.o. male, referred by Pastora Kulkarni MD presents for evaluation of left shoulder pain over several years.  He sustained an injury to his left arm about 6 years ago he traveled to [facility] and had plating of his left humerus.  He thinks he may have jammed his shoulder when this happened.  He had significant injury to his left arm and has some residual pain and numbness.  He recently did some painting work on the ceiling and this seems to have made his shoulder pain worse.  He also has chronic neck issues.  Medical history includes gout , coronary artery disease, previous MI with stent placement social history is negative for tobacco.        Active Problem List:  Patient Active Problem List   Diagnosis   • CAD (coronary artery disease)   • Hypertension   • Dyslipidemia   • Osteoarthritis   • Gout   • History of gluten intolerance   • H/O degenerative disc disease   • Iron deficiency anemia, unspecified   • GERD (gastroesophageal reflux disease)   • Lymph node symptom   • Angina pectoris (HCC)           Past Medical History:  Past Medical History:   Diagnosis Date   • Arthritis    • CAD (coronary artery disease)    • Closed fracture of proximal end of left humerus 09/03/2016   • Diabetes (HCC)    • Dyslipidemia    • GERD (gastroesophageal reflux disease)    • Gout    • H/O degenerative disc disease     Degenerative disc disease of the cervical and lumbar spine/chronic back pain   • Heart disease    • History of gluten intolerance    • Hypertension    • Myocardial infarction (HCC)    • Osteoarthritis            Past Surgical History:  Past Surgical History:   Procedure Laterality Date   • APPENDECTOMY     • CARDIAC CATHETERIZATION     • CARDIAC  CATHETERIZATION Left 04/28/2021    Procedure: Left Heart Cath;  Surgeon: Grady Garcia MD;  Location: [facility] CATH INVASIVE LOCATION;  Service: Cardiology;  Laterality: Left;   • CORONARY ANGIOPLASTY WITH STENT PLACEMENT     • CYSTOSCOPY TRANSURETHRAL RESECTION OF PROSTATE     • HEMORRHOIDECTOMY     • LUMBAR FUSION     • REPLACEMENT TOTAL HIP LATERAL POSITION Right 2018           Family History:  Family History   Problem Relation Age of Onset   • Arthritis Mother    • Hypertension Mother    • Arthritis Father    • Cancer Father    • Diabetes Sister    • Cancer Son          Social History:  Social History     Socioeconomic History   • Marital status:    Tobacco Use   • Smoking status: Never   • Smokeless tobacco: Never   Vaping Use   • Vaping Use: Never used   Substance and Sexual Activity   • Alcohol use: No   • Drug use: No   • Sexual activity: Defer     Body mass index is 23.39 kg/m².      Medications:  Current Outpatient Medications   Medication Sig Dispense Refill   • aspirin 81 MG EC tablet Take 81 mg by mouth Daily.     • calcium carbonate (OS-HIRAM) 600 MG tablet Take 600 mg by mouth 2 (Two) Times a Day With Meals.     • cetirizine (zyrTEC) 10 MG tablet Take 1 tablet by mouth Daily.     • cholecalciferol (VITAMIN D3) 1000 units tablet Take 1,000 Units by mouth 2 (Two) Times a Day.     • clopidogrel (PLAVIX) 75 MG tablet Take 1 tablet by mouth Daily. 90 tablet 3   • Coenzyme Q10 (COQ10 PO) Take 100 mg by mouth daily.     • docusate sodium (COLACE) 100 MG capsule Take 300 mg by mouth every night.     • dutasteride (AVODART) 0.5 MG capsule Daily.     • folic acid (FOLVITE) 1 MG tablet Take 1 mg by mouth Daily.     • gabapentin (NEURONTIN) 300 MG capsule Take 300 mg by mouth Daily.     • ibuprofen (ADVIL,MOTRIN) 200 MG tablet Take 1 tablet by mouth Every 6 (Six) Hours As Needed for Mild Pain.     • JANUVIA 100 MG tablet Take 100 mg by mouth Daily.     • lansoprazole (PREVACID) 30 MG capsule Take 30 mg by  mouth daily.     • latanoprost (XALATAN) 0.005 % ophthalmic solution Administer 1 drop to the right eye every night.     • meclizine (ANTIVERT) 25 MG tablet Take 1 tablet by mouth 3 (Three) Times a Day As Needed for Dizziness. 20 tablet 0   • metoprolol succinate XL (TOPROL-XL) 50 MG 24 hr tablet Take 50 mg by mouth Daily.     • multivitamin with minerals (CENTRUM ADULTS PO) Take 1 tablet by mouth Daily.     • nitroglycerin (NITROSTAT) 0.4 MG SL tablet 1 under the tongue as needed for angina, may repeat q5mins for up three doses 100 tablet 1   • olmesartan (BENICAR) 40 MG tablet Take 40 mg by mouth Daily.     • Omega-3-Acid Eth Est, Dietary, 1 G capsule Take 1 capsule by mouth 2 (two) times a day.     • repaglinide (PRANDIN) 1 MG tablet Take 1 mg by mouth 2 (Two) Times a Day Before Meals.     • rosuvastatin (CRESTOR) 20 MG tablet Take 20 mg by mouth Daily.     • traMADol HCl  MG capsule sustained-release 24 hr Take 300 mg by mouth Daily.     • vitamin B-12 (CYANOCOBALAMIN) 1000 MCG tablet Take 1,000 mcg by mouth Daily.     • celecoxib (CeleBREX) 200 MG capsule Take 1 capsule by mouth Daily for 30 days. 30 capsule 2   • Linzess 72 MCG capsule capsule Daily.       No current facility-administered medications for this visit.         Allergies:  Allergies   Allergen Reactions   • Shrimp (Diagnostic) Other (See Comments)     Lip swelling and hives   • Iodine Unknown - Low Severity         Review of Systems:   Review of Systems   Constitutional: Positive for activity change and fatigue.   HENT: Positive for hearing loss, postnasal drip and tinnitus.    Eyes: Negative.    Respiratory: Negative.    Cardiovascular: Positive for chest pain.   Gastrointestinal: Positive for constipation.   Endocrine: Negative.    Genitourinary: Positive for frequency.   Musculoskeletal: Positive for arthralgias, back pain, neck pain and neck stiffness.   Skin: Negative.    Allergic/Immunologic: Positive for food allergies.  "  Neurological: Negative.    Hematological: Bruises/bleeds easily.   Psychiatric/Behavioral: Negative.          Physical Exam:   Physical Exam  GENERAL: 72 y.o. male, alert and oriented X 3 in no acute distress.   Visit Vitals  /68   Pulse 81   Ht 177.8 cm (70\")   Wt 73.9 kg (163 lb)   BMI 23.39 kg/m²         Musculoskeletal:   Examination shoulder reveals limited flexion to 90 degrees with moderate pain.  20 degree arc of internal and external rotation with associated pain and crepitance.  Neurovascular exam is grossly intact.  With Jobes maneuver he has good strength.  Acromioclavicular joint with mild tenderness.        Radiology/Labs:     XR Shoulder 2+ View Left    Result Date: 11/10/2022  1.  Glenohumeral narrowing. 2.  No acute fracture.  This report was finalized on 11/10/2022 7:24 AM by Dr. Gerard Salinas MD.        Radiographs left shoulder show midshaft humeral plate in good position and alignment.  There is advanced osteoarthritis of the glenohumeral joint with large osteophytes.        Assessment & Plan:   72 y.o. male presents with advanced osteoarthritis left shoulder glenohumeral joint.  We discussed options including intra-articular steroid injection however he reports he has had problems in the past with elevated blood pressure following steroid injections.  He also question the possibility of NSAIDs as he is currently not receiving.  I think Celebrex is a reasonable choice.  He is placed on Celebrex 200 mg daily this may help his other plaints including low back neck and hips.  We will follow-up in the future as needed if he wishes to receive intra-articular steroid injection.  We briefly discussed the possibility of total older arthroplasty he wishes to postpone for now.        ICD-10-CM ICD-9-CM   1. Arthritis of left glenohumeral joint  M19.012 716.91         Cc:   Pastora Kulkarni MD                This document has been electronically signed by Chris Gu MD   November 12, " 2022 10:36 EST

## 2022-11-30 ENCOUNTER — HOSPITAL ENCOUNTER (OUTPATIENT)
Dept: GENERAL RADIOLOGY | Facility: HOSPITAL | Age: 72
Discharge: HOME | End: 2022-11-30
Admitting: ORTHOPAEDIC SURGERY

## 2022-11-30 ENCOUNTER — OFFICE VISIT (OUTPATIENT)
Dept: ORTHOPEDIC SURGERY | Facility: CLINIC | Age: 72
End: 2022-11-30

## 2022-11-30 VITALS — BODY MASS INDEX: 23.34 KG/M2 | WEIGHT: 163 LBS | HEIGHT: 70 IN

## 2022-11-30 DIAGNOSIS — M25.551 RIGHT HIP PAIN: ICD-10-CM

## 2022-11-30 DIAGNOSIS — M25.551 RIGHT HIP PAIN: Primary | ICD-10-CM

## 2022-11-30 PROCEDURE — 73502 X-RAY EXAM HIP UNI 2-3 VIEWS: CPT

## 2022-11-30 PROCEDURE — 73502 X-RAY EXAM HIP UNI 2-3 VIEWS: CPT | Performed by: RADIOLOGY

## 2022-11-30 PROCEDURE — 99213 OFFICE O/P EST LOW 20 MIN: CPT | Performed by: ORTHOPAEDIC SURGERY

## 2023-01-25 ENCOUNTER — TRANSCRIBE ORDERS (OUTPATIENT)
Dept: ADMINISTRATIVE | Facility: HOSPITAL | Age: 73
End: 2023-01-25
Payer: MEDICARE

## 2023-01-25 DIAGNOSIS — R22.1 NECK MASS: Primary | ICD-10-CM

## 2023-01-25 DIAGNOSIS — R17 JAUNDICE: ICD-10-CM

## 2023-01-31 ENCOUNTER — TRANSCRIBE ORDERS (OUTPATIENT)
Dept: ADMINISTRATIVE | Facility: HOSPITAL | Age: 73
End: 2023-01-31
Payer: MEDICARE

## 2023-01-31 DIAGNOSIS — R17 JAUNDICE: Primary | ICD-10-CM

## 2023-01-31 DIAGNOSIS — R22.1 NECK MASS: ICD-10-CM

## 2023-02-14 ENCOUNTER — OFFICE VISIT (OUTPATIENT)
Dept: CARDIOLOGY | Facility: CLINIC | Age: 73
End: 2023-02-14
Payer: MEDICARE

## 2023-02-14 VITALS
SYSTOLIC BLOOD PRESSURE: 96 MMHG | HEIGHT: 70 IN | WEIGHT: 165 LBS | HEART RATE: 75 BPM | DIASTOLIC BLOOD PRESSURE: 62 MMHG | OXYGEN SATURATION: 98 % | BODY MASS INDEX: 23.62 KG/M2

## 2023-02-14 DIAGNOSIS — I10 ESSENTIAL HYPERTENSION: ICD-10-CM

## 2023-02-14 DIAGNOSIS — I25.10 CORONARY ARTERY DISEASE INVOLVING NATIVE CORONARY ARTERY OF NATIVE HEART WITHOUT ANGINA PECTORIS: ICD-10-CM

## 2023-02-14 DIAGNOSIS — R00.2 PALPITATIONS: Primary | ICD-10-CM

## 2023-02-14 DIAGNOSIS — E78.5 DYSLIPIDEMIA: ICD-10-CM

## 2023-02-14 PROCEDURE — 99214 OFFICE O/P EST MOD 30 MIN: CPT | Performed by: INTERNAL MEDICINE

## 2023-02-14 RX ORDER — GABAPENTIN 100 MG/1
200 CAPSULE ORAL DAILY
COMMUNITY
Start: 2023-01-27

## 2023-02-14 NOTE — PROGRESS NOTES
[facility] Cardiology    Encounter Date: 2023    Patient ID: Alfredito Marcus is a 73 y.o. male.  : 1950     PCP: Pastora Kulkarni MD       Chief Complaint: Palpitations and Coronary artery disease involving native coronary artery of      PROBLEM LIST:  1. Coronary artery disease:  a. Unstable angina with abnormal Cardiolite stress test, 2008.  b. [facility], 2008, Dr. Garcia: 99% LAD stenosis s/p 2 overlapping Cypher LIZZETH with balloon angioplasty of D1. Rest of the vessels is free from significant disease and LV function was normal.  c. Cardiolite stress test, 2011: Mild apical ischemia. EF 64%.  d. [facility], 2011, Dr. Garcia: Patent stents of the LAD, no significant disease. Normal EF.  e. [facility], 2016, Dr. Edouard: Non-flow-limiting CAD. Widely patent LAD stents. Mild (20%-30%) disease in the LAD and RCA. Normal EF.  f. Echocardiogram, 2020: EF 56-60%. LVDF consistent with impaired relaxation. No significant valvular heart disease. No pericardial effusion.   g. [facility], 2021, Dr. Garcia: EF 65%. 90% stenosis of mid LAD stented with 2.5 x 18 mm LIZZETH and reduced to 0%. Patent stents of the proximal to mid LAD. Residual nonobstructive disease of the distal LAD, the first marginal and the RCA.   h. Echocardiogram, 2022; EF 61-65%. Grade I diastolic dysfunction. Trace MR.   2. Bilateral carotid bruits  a. Carotid duplex, 2020: No hemodynamically single plaques or stenoses were demonstrated in carotid systems. Both vertebral artery flows are antegrade.  b. Carotid CT Angiogram, 10/16/2022; Minimal atherosclerotic disease at the carotid bifurcations. There is no carotid or vertebral arterial stenosis or dissection in the neck. Negative intracranial CTA.  3. Palpitations  a. Event monitor, 06/15/2021; SR. Rare APCs. Occasional PVCs with couplets and triplets. Rare SVT up to 17 beats.    4. Hypertension.  5. Dyslipidemia.  6. Osteoarthritis.  7. Gout.  8. History of gluten intolerance.  9. H/O degenerative disc disease.  10. Closed fracture of proximal end of left humerus.  11. Surgical history  a. Cholecystectomy  b. Total replacement of rt hip  c. Appendectomy     History of Present Illness  Patient presents today for a follow-up with a history of coronary artery disease, bilateral carotid bruits, and cardiac risk factors. Since last visit, patient states that he has been doing well from a cardiovascular standpoint. He reports that he does not have an exercise routine during the winter months. He reports that he does try to stay active in the summer months. Patient reports that he occasionally has a heart flutter or skipped beat but notes it is only one or two beats. He states it does not bother him. Patient notes his last lab work showed his cholesterol was low-normal. Patient denies chest pain, shortness of breath, edema, dizziness, and syncope.     Allergies   Allergen Reactions   • Shrimp (Diagnostic) Other (See Comments)     Lip swelling and hives   • Iodine Unknown - Low Severity         Current Outpatient Medications:   •  aspirin 81 MG EC tablet, Take 81 mg by mouth Daily., Disp: , Rfl:   •  calcium carbonate (OS-HIRAM) 600 MG tablet, Take 600 mg by mouth 2 (Two) Times a Day With Meals., Disp: , Rfl:   •  cetirizine (zyrTEC) 10 MG tablet, Take 1 tablet by mouth Daily., Disp: , Rfl:   •  cholecalciferol (VITAMIN D3) 1000 units tablet, Take 1,000 Units by mouth 2 (Two) Times a Day., Disp: , Rfl:   •  clopidogrel (PLAVIX) 75 MG tablet, Take 1 tablet by mouth Daily., Disp: 90 tablet, Rfl: 3  •  Coenzyme Q10 (COQ10 PO), Take 100 mg by mouth daily., Disp: , Rfl:   •  docusate sodium (COLACE) 100 MG capsule, Take 300 mg by mouth every night., Disp: , Rfl:   •  dutasteride (AVODART) 0.5 MG capsule, Daily., Disp: , Rfl:   •  folic acid (FOLVITE) 1 MG tablet, Take 1 mg by mouth Daily., Disp: , Rfl:  "  •  gabapentin (NEURONTIN) 100 MG capsule, 200 mg Daily., Disp: , Rfl:   •  ibuprofen (ADVIL,MOTRIN) 200 MG tablet, Take 1 tablet by mouth Every 6 (Six) Hours As Needed for Mild Pain., Disp: , Rfl:   •  JANUVIA 100 MG tablet, Take 100 mg by mouth Daily., Disp: , Rfl:   •  lansoprazole (PREVACID) 30 MG capsule, Take 30 mg by mouth daily., Disp: , Rfl:   •  latanoprost (XALATAN) 0.005 % ophthalmic solution, Administer 1 drop to the right eye every night., Disp: , Rfl:   •  meclizine (ANTIVERT) 25 MG tablet, Take 1 tablet by mouth 3 (Three) Times a Day As Needed for Dizziness., Disp: 20 tablet, Rfl: 0  •  multivitamin with minerals tablet tablet, Take 1 tablet by mouth Daily., Disp: , Rfl:   •  nitroglycerin (NITROSTAT) 0.4 MG SL tablet, 1 under the tongue as needed for angina, may repeat q5mins for up three doses, Disp: 100 tablet, Rfl: 1  •  olmesartan (BENICAR) 40 MG tablet, Take 40 mg by mouth Daily., Disp: , Rfl:   •  Omega-3-Acid Eth Est, Dietary, 1 G capsule, Take 1 capsule by mouth 2 (two) times a day., Disp: , Rfl:   •  repaglinide (PRANDIN) 1 MG tablet, Take 1 mg by mouth 2 (Two) Times a Day Before Meals., Disp: , Rfl:   •  rosuvastatin (CRESTOR) 20 MG tablet, Take 20 mg by mouth Daily., Disp: , Rfl:   •  traMADol HCl  MG capsule sustained-release 24 hr, Take 300 mg by mouth Daily., Disp: , Rfl:   •  vitamin B-12 (CYANOCOBALAMIN) 1000 MCG tablet, Take 1,000 mcg by mouth Daily., Disp: , Rfl:     The following portions of the patient's history were reviewed and updated as appropriate: allergies, current medications, past family history, past medical history, past social history, past surgical history and problem list.    ROS  Review of Systems   14 point ROS negative except for that listed in the HPI.          Objective:     BP 96/62 (BP Location: Left arm, Patient Position: Sitting)   Pulse 75   Ht 177.8 cm (70\")   Wt 74.8 kg (165 lb)   SpO2 98%   BMI 23.68 kg/m²      Physical Exam  Constitutional: " Patient appears well-developed and well-nourished.   HENT: HEENT exam unremarkable.   Neck: Neck supple. No JVD present. No carotid bruits.   Cardiovascular: Normal rate, regular rhythm and normal heart sounds. No murmur heard.   2+ symmetric pulses.   Pulmonary/Chest: Breath sounds normal. Does not exhibit tenderness.   Abdominal: Abdomen benign.   Musculoskeletal: Does not exhibit edema.   Neurological: Neurological exam unremarkable.   Vitals reviewed.    Data Review:   Lab Results   Component Value Date    GLUCOSE 158 (H) 10/16/2022    BUN 22 10/16/2022    CREATININE 1.11 10/16/2022    EGFRIFNONA 66 04/28/2021    BCR 19.8 10/16/2022     10/16/2022    K 4.0 10/16/2022    CO2 27.3 10/16/2022    CALCIUM 9.2 10/16/2022    ALBUMIN 4.35 10/16/2022    AST 24 10/16/2022    ALT 20 10/16/2022      Lab Results   Component Value Date    WBC 10.28 10/16/2022    RBC 4.17 10/16/2022    HGB 13.8 10/16/2022    HCT 39.2 10/16/2022    MCV 94.0 10/16/2022     10/16/2022        Procedures     Assessment:      Diagnosis Plan   1. Palpitations  Stable and asymptomatic with occasional recurrence.       2. Coronary artery disease involving native coronary artery of native heart without angina pectoris  Stable without angina on current activity. Continue on aspirin 81 mg and Plavix 75 mg daily for DAPT and nitroglycerin 0.4 mg as needed for angina.        3. Essential hypertension  Well controlled. Continue on olmesartan 40 mg daily for hypertension.        4. Dyslipidemia  Well controlled, managed by PCP. Continue on rosuvastatin 20 mg daily for dyslipidemia.          Plan:   Stable cardiac status. No current angina or CHF symptoms.   Continue current medications.   FU in 6 MO, sooner as needed.  Thank you for allowing us to participate in the care of your patient.       Scribed for Grady Garcia MD by Tootie Arias. 2/14/2023 13:37 EST         IGrady MD, personally performed the services described in this  documentation as scribed by the above named individual in my presence, and it is both accurate and complete.  2/15/2023  05:59 EST      Please note that portions of this note may have been completed with a voice recognition program. Efforts were made to edit the dictations, but occasionally words are mistranscribed.

## 2023-02-28 ENCOUNTER — HOSPITAL ENCOUNTER (OUTPATIENT)
Dept: CT IMAGING | Facility: HOSPITAL | Age: 73
Discharge: HOME | End: 2023-02-28
Payer: MEDICARE

## 2023-02-28 ENCOUNTER — HOSPITAL ENCOUNTER (OUTPATIENT)
Dept: ULTRASOUND IMAGING | Facility: HOSPITAL | Age: 73
Discharge: HOME | End: 2023-02-28
Payer: MEDICARE

## 2023-02-28 DIAGNOSIS — R22.1 NECK MASS: ICD-10-CM

## 2023-02-28 DIAGNOSIS — R17 JAUNDICE: ICD-10-CM

## 2023-02-28 PROCEDURE — 76700 US EXAM ABDOM COMPLETE: CPT

## 2023-02-28 PROCEDURE — 76700 US EXAM ABDOM COMPLETE: CPT | Performed by: RADIOLOGY

## 2023-02-28 PROCEDURE — 70490 CT SOFT TISSUE NECK W/O DYE: CPT

## 2023-02-28 PROCEDURE — 70490 CT SOFT TISSUE NECK W/O DYE: CPT | Performed by: RADIOLOGY

## 2023-03-09 ENCOUNTER — TRANSCRIBE ORDERS (OUTPATIENT)
Dept: ADMINISTRATIVE | Facility: HOSPITAL | Age: 73
End: 2023-03-09
Payer: MEDICARE

## 2023-03-09 DIAGNOSIS — R10.84 ABDOMINAL PAIN, GENERALIZED: Primary | ICD-10-CM

## 2023-03-30 ENCOUNTER — HOSPITAL ENCOUNTER (OUTPATIENT)
Dept: MRI IMAGING | Facility: HOSPITAL | Age: 73
Discharge: HOME | End: 2023-03-30
Admitting: INTERNAL MEDICINE
Payer: MEDICARE

## 2023-03-30 DIAGNOSIS — R10.84 ABDOMINAL PAIN, GENERALIZED: ICD-10-CM

## 2023-03-30 PROCEDURE — 74183 MRI ABD W/O CNTR FLWD CNTR: CPT | Performed by: RADIOLOGY

## 2023-03-30 PROCEDURE — 74183 MRI ABD W/O CNTR FLWD CNTR: CPT

## 2023-03-30 PROCEDURE — A9577 INJ MULTIHANCE: HCPCS | Performed by: INTERNAL MEDICINE

## 2023-03-30 PROCEDURE — 0 GADOBENATE DIMEGLUMINE 529 MG/ML SOLUTION: Performed by: INTERNAL MEDICINE

## 2023-03-30 RX ADMIN — GADOBENATE DIMEGLUMINE 15 ML: 529 INJECTION, SOLUTION INTRAVENOUS at 10:29

## 2023-03-31 RX ORDER — CLOPIDOGREL BISULFATE 75 MG/1
75 TABLET ORAL DAILY
Qty: 90 TABLET | Refills: 3 | Status: SHIPPED | OUTPATIENT
Start: 2023-03-31

## 2023-04-12 ENCOUNTER — OFFICE VISIT (OUTPATIENT)
Dept: ORTHOPEDIC SURGERY | Facility: CLINIC | Age: 73
End: 2023-04-12
Payer: MEDICARE

## 2023-04-12 VITALS — BODY MASS INDEX: 23.62 KG/M2 | WEIGHT: 165 LBS | HEIGHT: 70 IN

## 2023-04-12 DIAGNOSIS — M19.012 PRIMARY OSTEOARTHRITIS OF LEFT SHOULDER: Primary | ICD-10-CM

## 2023-04-12 RX ADMIN — METHYLPREDNISOLONE ACETATE 40 MG: 40 INJECTION, SUSPENSION INTRA-ARTICULAR; INTRALESIONAL; INTRAMUSCULAR; SOFT TISSUE at 12:34

## 2023-04-12 RX ADMIN — LIDOCAINE HYDROCHLORIDE 3 ML: 10 INJECTION, SOLUTION EPIDURAL; INFILTRATION; INTRACAUDAL; PERINEURAL at 12:34

## 2023-04-12 NOTE — PROGRESS NOTES
Follow-up Visit         Patient: Alfredito Marcus  YOB: 1950  Date of Encounter: 04/12/2023      Chief  Complaint:   Chief Complaint   Patient presents with   • Left Shoulder - Follow-up, Pain         HPI:  Alfredito Marcus, 73 y.o. male presents in follow-up left shoulder pain is now increasing in severity and he describes additional pain radiating to the axillary and chest region.  He has had no new injuries and does not experience weakness or numbness to his left arm.  He was last seen November 2022.  Does have chronic neck issues but denies worsening neck pain.  His medical history also includes angina and gout.  Has undergone cardiac catheterization he describes pain radiating medial aspect as sharp only lasting a few seconds.  He does not experience cardiac symptoms with his new chest wall pain.        Medical History:  Patient Active Problem List   Diagnosis   • CAD (coronary artery disease)   • Hypertension   • Dyslipidemia   • Osteoarthritis   • Gout   • History of gluten intolerance   • H/O degenerative disc disease   • Iron deficiency anemia, unspecified   • GERD (gastroesophageal reflux disease)   • Lymph node symptom   • Angina pectoris     Past Medical History:   Diagnosis Date   • Arthritis    • CAD (coronary artery disease)    • Closed fracture of proximal end of left humerus 09/03/2016   • Diabetes    • Dyslipidemia    • GERD (gastroesophageal reflux disease)    • Gout    • H/O degenerative disc disease     Degenerative disc disease of the cervical and lumbar spine/chronic back pain   • Heart disease    • History of gluten intolerance    • Hypertension    • Myocardial infarction    • Osteoarthritis          Social History:  Social History     Socioeconomic History   • Marital status:    Tobacco Use   • Smoking status: Never   • Smokeless tobacco: Never   Vaping Use   • Vaping Use: Never used   Substance and Sexual Activity   • Alcohol use: No   • Drug use: No   • Sexual activity:  Defer         Current Medications:    Current Outpatient Medications:   •  aspirin 81 MG EC tablet, Take 1 tablet by mouth Daily., Disp: , Rfl:   •  calcium carbonate (OS-HIRAM) 600 MG tablet, Take 1 tablet by mouth 2 (Two) Times a Day With Meals., Disp: , Rfl:   •  cetirizine (zyrTEC) 10 MG tablet, Take 1 tablet by mouth Daily., Disp: , Rfl:   •  cholecalciferol (VITAMIN D3) 1000 units tablet, Take 1 tablet by mouth 2 (Two) Times a Day., Disp: , Rfl:   •  clopidogrel (PLAVIX) 75 MG tablet, TAKE 1 TABLET BY MOUTH DAILY., Disp: 90 tablet, Rfl: 3  •  Coenzyme Q10 (COQ10 PO), Take 100 mg by mouth daily., Disp: , Rfl:   •  docusate sodium (COLACE) 100 MG capsule, Take 3 capsules by mouth Every Night., Disp: , Rfl:   •  folic acid (FOLVITE) 1 MG tablet, Take 1 tablet by mouth Daily., Disp: , Rfl:   •  JANUVIA 100 MG tablet, Take 1 tablet by mouth Daily., Disp: , Rfl:   •  lansoprazole (PREVACID) 30 MG capsule, Take 1 capsule by mouth Daily., Disp: , Rfl:   •  latanoprost (XALATAN) 0.005 % ophthalmic solution, Administer 1 drop to the right eye Every Night., Disp: , Rfl:   •  meclizine (ANTIVERT) 25 MG tablet, Take 1 tablet by mouth 3 (Three) Times a Day As Needed for Dizziness., Disp: 20 tablet, Rfl: 0  •  multivitamin with minerals tablet tablet, Take 1 tablet by mouth Daily., Disp: , Rfl:   •  nitroglycerin (NITROSTAT) 0.4 MG SL tablet, 1 under the tongue as needed for angina, may repeat q5mins for up three doses, Disp: 100 tablet, Rfl: 1  •  olmesartan (BENICAR) 40 MG tablet, Take 1 tablet by mouth Daily., Disp: , Rfl:   •  Omega-3-Acid Eth Est, Dietary, 1 G capsule, Take 1 capsule by mouth 2 (Two) Times a Day., Disp: , Rfl:   •  repaglinide (PRANDIN) 1 MG tablet, Take 1 tablet by mouth 2 (Two) Times a Day Before Meals., Disp: , Rfl:   •  rosuvastatin (CRESTOR) 20 MG tablet, Take 1 tablet by mouth Daily., Disp: , Rfl:   •  traMADol HCl, ER Biphasic, (RYZOLT) 200 MG tablet sustained-release 24 hour, Take 1 tablet by  mouth Daily., Disp: , Rfl:   •  vitamin B-12 (CYANOCOBALAMIN) 1000 MCG tablet, Take 1 tablet by mouth Daily., Disp: , Rfl:   •  ibuprofen (ADVIL,MOTRIN) 200 MG tablet, Take 1 tablet by mouth Every 6 (Six) Hours As Needed for Mild Pain. (Patient not taking: Reported on 4/12/2023), Disp: , Rfl:       Allergies:  Allergies   Allergen Reactions   • Shrimp (Diagnostic) Other (See Comments)     Lip swelling and hives   • Iodine Unknown - Low Severity         Family History:  Family History   Problem Relation Age of Onset   • Arthritis Mother    • Hypertension Mother    • Arthritis Father    • Cancer Father    • Diabetes Sister    • Cancer Son          Surgical History:  Past Surgical History:   Procedure Laterality Date   • APPENDECTOMY     • CARDIAC CATHETERIZATION     • CARDIAC CATHETERIZATION Left 04/28/2021    Procedure: Left Heart Cath;  Surgeon: Grady Garcia MD;  Location: [facility] CATH INVASIVE LOCATION;  Service: Cardiology;  Laterality: Left;   • CORONARY ANGIOPLASTY WITH STENT PLACEMENT     • CYSTOSCOPY TRANSURETHRAL RESECTION OF PROSTATE     • HEMORRHOIDECTOMY     • LUMBAR FUSION     • REPLACEMENT TOTAL HIP LATERAL POSITION Right 2018         Radiology:   MRI Abdomen With & Without Contrast    Result Date: 3/30/2023  1.  Again there is a multiseptated otherwise simple appearing cyst of the left kidney measuring 6.7 cm. 2.  No definite mass visualized.  This report was finalized on 3/30/2023 10:40 AM by Dr. Tito Gallagher MD.          Radiographs reviewed of left shoulder show advanced glenohumeral osteoarthritis with large osteophyte and absence of joint space.        Orthopedic Examination: Left shoulder reveals decreased mobility with forward elevation to 160 degrees internal rotational arc of 25 degrees.  Normal neurovascular status.        Assessment & Plan:   73 y.o. male presents with advanced osteoarthritis left shoulder glenohumeral joint with worsening shoulder pain now radiating medially we discussed  options he elected to receive and was provided intra-articular steroid injection Depo-Medrol 40 mg with lidocaine block.  10 minutes following the injection he had moderate improvement in his shoulder pain.  He will return in the future as needed.         Diagnosis Plan   1. Primary osteoarthritis of left shoulder              Large Joint Arthrocentesis: L glenohumeral  Date/Time: 4/12/2023 12:34 PM  Consent given by: patient  Site marked: site marked  Timeout: Immediately prior to procedure a time out was called to verify the correct patient, procedure, equipment, support staff and site/side marked as required   Supporting Documentation  Indications: pain   Procedure Details  Location: shoulder - L glenohumeral  Preparation: Patient was prepped and draped in the usual sterile fashion  Needle size: 25 G  Approach: anterior  Medications administered: 40 mg methylPREDNISolone acetate 40 MG/ML; 3 mL lidocaine PF 1% 1 %  Patient tolerance: patient tolerated the procedure well with no immediate complications              Cc:  Pastora Kulkarni MD              This document has been electronically signed by Chris Gu MD   April 14, 2023 12:33 EDT

## 2023-04-19 RX ORDER — METHYLPREDNISOLONE ACETATE 40 MG/ML
40 INJECTION, SUSPENSION INTRA-ARTICULAR; INTRALESIONAL; INTRAMUSCULAR; SOFT TISSUE
Status: COMPLETED | OUTPATIENT
Start: 2023-04-12 | End: 2023-04-12

## 2023-04-19 RX ORDER — LIDOCAINE HYDROCHLORIDE 10 MG/ML
3 INJECTION, SOLUTION EPIDURAL; INFILTRATION; INTRACAUDAL; PERINEURAL
Status: COMPLETED | OUTPATIENT
Start: 2023-04-12 | End: 2023-04-12

## 2023-04-21 ENCOUNTER — TELEPHONE (OUTPATIENT)
Dept: CARDIOLOGY | Facility: CLINIC | Age: 73
End: 2023-04-21
Payer: MEDICARE

## 2023-04-21 RX ORDER — NITROGLYCERIN 0.4 MG/1
TABLET SUBLINGUAL
Qty: 100 TABLET | Refills: 1 | Status: SHIPPED | OUTPATIENT
Start: 2023-04-21

## 2023-04-24 NOTE — TELEPHONE ENCOUNTER
Called patient to check on him. Patient denies any more pain lately but would still like to follow up sooner. Patient able to drive to [place] on May 4th. Will be added to the clinic.

## 2023-04-24 NOTE — TELEPHONE ENCOUNTER
Agree with addition of nitroglycerin.    Please call Mr. Marcus and check on him and see if he is continuing to have chest pain.  If he is having pain that is relieved with rest we can see him in the office on May 4.  If he has chest pain not relieved with rest and sublingual nitroglycerin, he should report to the ER.

## 2023-04-27 ENCOUNTER — OFFICE VISIT (OUTPATIENT)
Dept: INFECTIOUS DISEASES | Facility: CLINIC | Age: 73
End: 2023-04-27
Payer: MEDICARE

## 2023-04-27 VITALS
DIASTOLIC BLOOD PRESSURE: 59 MMHG | SYSTOLIC BLOOD PRESSURE: 99 MMHG | BODY MASS INDEX: 23.13 KG/M2 | OXYGEN SATURATION: 100 % | TEMPERATURE: 97.3 F | HEART RATE: 70 BPM | HEIGHT: 70 IN | WEIGHT: 161.6 LBS

## 2023-04-27 DIAGNOSIS — I88.9 LYMPHADENITIS: Primary | ICD-10-CM

## 2023-04-27 PROCEDURE — 86606 ASPERGILLUS ANTIBODY: CPT | Performed by: INTERNAL MEDICINE

## 2023-04-27 PROCEDURE — 86480 TB TEST CELL IMMUN MEASURE: CPT | Performed by: INTERNAL MEDICINE

## 2023-04-27 PROCEDURE — 86611 BARTONELLA ANTIBODY: CPT | Performed by: INTERNAL MEDICINE

## 2023-04-27 PROCEDURE — 85025 COMPLETE CBC W/AUTO DIFF WBC: CPT | Performed by: INTERNAL MEDICINE

## 2023-04-27 PROCEDURE — 86612 BLASTOMYCES ANTIBODY: CPT | Performed by: INTERNAL MEDICINE

## 2023-04-27 PROCEDURE — 86140 C-REACTIVE PROTEIN: CPT | Performed by: INTERNAL MEDICINE

## 2023-04-27 NOTE — PROGRESS NOTES
[facility]         Referring Provider: Pastora Kulkarni MD  [address] [place] [address]    Subjective      Chief Complaint  Adenopathy    History of Present Illness  Alfredito Marcus is a 73 y.o. male who presents today to [facility] for Initial Consultation  . Past medical history is significant for a year history of left-sided cervical lymphadenitis with pain of the cervical spine and left shoulder.  Patient had several CT scans of soft tissue cervical spine and shoulder with evidence of arthritis as well as tiny reactive lymph nodes without radiological adenopathy.    He denies any recent exposure to tuberculosis, moist moldy environments, cat scratch or bats.    Past Medical History:   Diagnosis Date   • Arthritis    • CAD (coronary artery disease)    • Closed fracture of proximal end of left humerus 09/03/2016   • Diabetes    • Dyslipidemia    • GERD (gastroesophageal reflux disease)    • Gout    • H/O degenerative disc disease     Degenerative disc disease of the cervical and lumbar spine/chronic back pain   • Heart disease    • History of gluten intolerance    • Hypertension    • Myocardial infarction    • Osteoarthritis        Past Surgical History:   Procedure Laterality Date   • APPENDECTOMY     • CARDIAC CATHETERIZATION     • CARDIAC CATHETERIZATION Left 04/28/2021    Procedure: Left Heart Cath;  Surgeon: Grady Garcia MD;  Location: Merged with [facility] INVASIVE LOCATION;  Service: Cardiology;  Laterality: Left;   • CORONARY ANGIOPLASTY WITH STENT PLACEMENT     • CYSTOSCOPY TRANSURETHRAL RESECTION OF PROSTATE     • HEMORRHOIDECTOMY     • LUMBAR FUSION     • REPLACEMENT TOTAL HIP LATERAL POSITION Right 2018       Social History     Socioeconomic History   • Marital status:    Tobacco Use   • Smoking status: Never   • Smokeless tobacco: Never   Vaping Use   • Vaping Use: Never used   Substance and Sexual Activity   • Alcohol use: No   • Drug  (0) Normal; no sensory loss use: No   • Sexual activity: Defer       Family History  family history includes Arthritis in his father and mother; Cancer in his father and son; Diabetes in his sister; Hypertension in his mother.    Immunization History   Administered Date(s) Administered   • COVID-19 (PFIZER) Purple Cap Monovalent 01/25/2021, 02/17/2021, 10/05/2021        Allergies  Allergies   Allergen Reactions   • Shrimp (Diagnostic) Other (See Comments)     Lip swelling and hives   • Iodine Unknown - Low Severity       The medication list has been reviewed and updated.   Current Medications    Current Outpatient Medications:   •  aspirin 81 MG EC tablet, Take 1 tablet by mouth Daily., Disp: , Rfl:   •  calcium carbonate (OS-HIRAM) 600 MG tablet, Take 1 tablet by mouth 2 (Two) Times a Day With Meals., Disp: , Rfl:   •  cetirizine (zyrTEC) 10 MG tablet, Take 1 tablet by mouth Daily., Disp: , Rfl:   •  cholecalciferol (VITAMIN D3) 1000 units tablet, Take 1 tablet by mouth 2 (Two) Times a Day., Disp: , Rfl:   •  clopidogrel (PLAVIX) 75 MG tablet, TAKE 1 TABLET BY MOUTH DAILY., Disp: 90 tablet, Rfl: 3  •  Coenzyme Q10 (COQ10 PO), Take 100 mg by mouth daily., Disp: , Rfl:   •  docusate sodium (COLACE) 100 MG capsule, Take 3 capsules by mouth Every Night., Disp: , Rfl:   •  folic acid (FOLVITE) 1 MG tablet, Take 1 tablet by mouth Daily., Disp: , Rfl:   •  JANUVIA 100 MG tablet, Take 1 tablet by mouth Daily., Disp: , Rfl:   •  lansoprazole (PREVACID) 30 MG capsule, Take 1 capsule by mouth Daily., Disp: , Rfl:   •  latanoprost (XALATAN) 0.005 % ophthalmic solution, Administer 1 drop to the right eye Every Night., Disp: , Rfl:   •  meclizine (ANTIVERT) 25 MG tablet, Take 1 tablet by mouth 3 (Three) Times a Day As Needed for Dizziness., Disp: 20 tablet, Rfl: 0  •  multivitamin with minerals tablet tablet, Take 1 tablet by mouth Daily., Disp: , Rfl:   •  nitroglycerin (NITROSTAT) 0.4 MG SL tablet, 1 under the tongue as needed for angina, may repeat q5mins  for up three doses, Disp: 100 tablet, Rfl: 1  •  olmesartan (BENICAR) 40 MG tablet, Take 1 tablet by mouth Daily., Disp: , Rfl:   •  Omega-3-Acid Eth Est, Dietary, 1 G capsule, Take 1 capsule by mouth 2 (Two) Times a Day., Disp: , Rfl:   •  repaglinide (PRANDIN) 1 MG tablet, Take 1 tablet by mouth 2 (Two) Times a Day Before Meals., Disp: , Rfl:   •  rosuvastatin (CRESTOR) 20 MG tablet, Take 1 tablet by mouth Daily., Disp: , Rfl:   •  traMADol HCl, ER Biphasic, (RYZOLT) 200 MG tablet sustained-release 24 hour, Take 1 tablet by mouth Daily., Disp: , Rfl:   •  vitamin B-12 (CYANOCOBALAMIN) 1000 MCG tablet, Take 1 tablet by mouth Daily., Disp: , Rfl:   •  ibuprofen (ADVIL,MOTRIN) 200 MG tablet, Take 1 tablet by mouth Every 6 (Six) Hours As Needed for Mild Pain. (Patient not taking: Reported on 4/12/2023), Disp: , Rfl:       Review of Systems    Review of Systems   Constitutional: Positive for fatigue. Negative for activity change, appetite change, chills, diaphoresis and fever.   HENT: Negative for congestion, dental problem, drooling, mouth sores, sinus pressure and sneezing.    Eyes: Negative for blurred vision, double vision, photophobia and discharge.   Respiratory: Negative for apnea, cough, choking, chest tightness, shortness of breath and wheezing.    Cardiovascular: Negative for chest pain, palpitations and leg swelling.   Gastrointestinal: Negative for abdominal distention, abdominal pain, diarrhea, nausea and vomiting.   Genitourinary: Negative for dysuria, flank pain and frequency.   Musculoskeletal: Positive for arthralgias and neck pain. Negative for gait problem and neck stiffness.   Skin: Negative for rash.   Neurological: Negative for dizziness, tremors, seizures, syncope, speech difficulty, numbness, headache and confusion.   Psychiatric/Behavioral: Negative for agitation, behavioral problems, hallucinations and suicidal ideas.        Objective     Vital Signs:  BP 99/59 (BP Location: Right arm, Patient  "Position: Sitting, Cuff Size: Small Adult)   Pulse 70   Temp 97.3 °F (36.3 °C) (Temporal)   Ht 177.8 cm (70\")   Wt 73.3 kg (161 lb 9.6 oz)   SpO2 100%   BMI 23.19 kg/m²   Estimated body mass index is 23.19 kg/m² as calculated from the following:    Height as of this encounter: 177.8 cm (70\").    Weight as of this encounter: 73.3 kg (161 lb 9.6 oz).    Physical Exam  Constitutional:       General: He is not in acute distress.     Appearance: Normal appearance. He is normal weight. He is not ill-appearing, toxic-appearing or diaphoretic.   HENT:      Head: Normocephalic and atraumatic.      Nose: Nose normal. No congestion or rhinorrhea.      Mouth/Throat:      Mouth: Mucous membranes are moist.      Pharynx: Oropharynx is clear.   Eyes:      General: No scleral icterus.     Extraocular Movements: Extraocular movements intact.      Pupils: Pupils are equal, round, and reactive to light.   Neck:      Vascular: No carotid bruit.   Cardiovascular:      Rate and Rhythm: Normal rate and regular rhythm.      Pulses: Normal pulses.      Heart sounds: No murmur heard.  Pulmonary:      Effort: Pulmonary effort is normal. No respiratory distress.      Breath sounds: Normal breath sounds. No stridor. No wheezing, rhonchi or rales.   Chest:      Chest wall: No tenderness.   Abdominal:      General: Abdomen is flat. Bowel sounds are normal. There is no distension.      Palpations: Abdomen is soft.      Tenderness: There is no abdominal tenderness. There is no guarding or rebound.   Musculoskeletal:      Cervical back: Normal range of motion and neck supple. No rigidity or tenderness.   Lymphadenopathy:      Cervical: No cervical adenopathy.   Skin:     Coloration: Skin is not jaundiced.      Findings: No lesion or rash.   Neurological:      General: No focal deficit present.      Mental Status: He is alert and oriented to person, place, and time.   Psychiatric:         Mood and Affect: Mood normal.         Behavior: Behavior " normal.          Result Review :  The following data was reviewed by Kell Godinez MD     Lab Results  Lab Results   Component Value Date    WBC 10.28 10/16/2022    HGB 13.8 10/16/2022    HCT 39.2 10/16/2022    MCV 94.0 10/16/2022     10/16/2022     Lab Results   Component Value Date    GLUCOSE 158 (H) 10/16/2022    BUN 22 10/16/2022    CREATININE 1.11 10/16/2022    EGFRIFNONA 66 04/28/2021    BCR 19.8 10/16/2022    K 4.0 10/16/2022    CO2 27.3 10/16/2022    CALCIUM 9.2 10/16/2022    ALBUMIN 4.35 10/16/2022    AST 24 10/16/2022    ALT 20 10/16/2022      Lab Results   Component Value Date    CRP <0.30 06/20/2022        No results found for: ACANTHNAEG, AFBCX, BPERTUSSISCX, BLOODCX  No results found for: BCIDPCR, CXREFLEX, CSFCX, CULTURETIS  No results found for: CULTURES, HSVCX, URCX  No results found for: EYECULTURE, GCCX, HSVCULTURE, LABHSV  No results found for: LEGIONELLA, MRSACX, MUMPSCX, MYCOPLASCX  No results found for: NOCARDIACX, STOOLCX  No results found for: THROATCX, UNSTIMCULT, URINECX, CULTURE, VZVCULTUR  No results found for: VIRALCULTU, WOUNDCX    Radiology Results  MRI Abdomen With & Without Contrast    Result Date: 3/30/2023  Impression: 1.  Again there is a multiseptated otherwise simple appearing cyst of the left kidney measuring 6.7 cm. 2.  No definite mass visualized.  This report was finalized on 3/30/2023 10:40 AM by Dr. Tito Gallagher MD.               Assessment / Plan        Diagnoses and all orders for this visit:    1. Lymphadenitis (Primary)  -     Fungal Antibodies, Quantitative Double Immunodiffusion; Future  -     Bartonella Antibody Panel; Future  -     QuantiFERON TB Gold; Future  -     C-reactive Protein; Future  -     Peripheral Blood Smear; Future  -     Cancel: QuantiFERON-TB Gold Plus; Future  -     Fungal Antibodies, Quantitative Double Immunodiffusion  -     Bartonella Antibody Panel  -     Cancel: QuantiFERON TB Gold  -     C-reactive Protein  -     Peripheral  Blood Smear    I am sure patient's concerns for adenopathy and possible underlying cancer is exacerbated by the recent passing of his wife due to sudden cancer that was possibly ovarian metastatic to the liver but unsure due to lack of biopsy and pathology.    At this time I would be mostly concerned about an underlying fungal disease such as histoplasmosis and went ahead and ordered Bartonella Lemos antibodies to rule out cat scratch disease, QuantiFERON-TB gold test fungal antibodies C-reactive protein as well as peripheral blood smear to rule out any early stage of hematological malignancy.    At some point I would recommend to check CT scan of the chest to rule out malignancy/mediastinal adenopathy that could be further diagnostic of a asymptomatic fungal illness such as histoplasmosis.            Follow Up   No follow-ups on file.    Visit Diagnoses:    ICD-10-CM ICD-9-CM   1. Lymphadenitis  I88.9 289.3       Patient was given instructions and counseling regarding his condition or for health maintenance advice. Please see specific information pulled into the AVS if appropriate.     This document has been electronically signed by Kell Godinez MD   April 27, 2023 12:06 EDT    Dictated Utilizing Dragon Dictation: Part of this note may be an electronic transcription/translation of spoken language to printed text using the Dragon Dictation System.

## 2023-04-28 LAB
BASOPHILS # BLD AUTO: 0.06 10*3/MM3 (ref 0–0.2)
BASOPHILS NFR BLD AUTO: 0.9 % (ref 0–1.5)
CRP SERPL-MCNC: <0.3 MG/DL (ref 0–0.5)
DEPRECATED RDW RBC AUTO: 40.2 FL (ref 37–54)
EOSINOPHIL # BLD AUTO: 0.29 10*3/MM3 (ref 0–0.4)
EOSINOPHIL NFR BLD AUTO: 4.5 % (ref 0.3–6.2)
ERYTHROCYTE [DISTWIDTH] IN BLOOD BY AUTOMATED COUNT: 11.4 % (ref 12.3–15.4)
HCT VFR BLD AUTO: 41.8 % (ref 37.5–51)
HGB BLD-MCNC: 14.8 G/DL (ref 13–17.7)
IMM GRANULOCYTES # BLD AUTO: 0.02 10*3/MM3 (ref 0–0.05)
IMM GRANULOCYTES NFR BLD AUTO: 0.3 % (ref 0–0.5)
LAB AP CASE REPORT: NORMAL
LYMPHOCYTES # BLD AUTO: 1.4 10*3/MM3 (ref 0.7–3.1)
LYMPHOCYTES NFR BLD AUTO: 21.7 % (ref 19.6–45.3)
MCH RBC QN AUTO: 33.7 PG (ref 26.6–33)
MCHC RBC AUTO-ENTMCNC: 35.4 G/DL (ref 31.5–35.7)
MCV RBC AUTO: 95.2 FL (ref 79–97)
MONOCYTES # BLD AUTO: 0.64 10*3/MM3 (ref 0.1–0.9)
MONOCYTES NFR BLD AUTO: 9.9 % (ref 5–12)
NEUTROPHILS NFR BLD AUTO: 4.05 10*3/MM3 (ref 1.7–7)
NEUTROPHILS NFR BLD AUTO: 62.7 % (ref 42.7–76)
NRBC BLD AUTO-RTO: 0 /100 WBC (ref 0–0.2)
PATH REPORT.FINAL DX SPEC: NORMAL
PATH REPORT.GROSS SPEC: NORMAL
PATHOLOGY REVIEW: YES
PLATELET # BLD AUTO: 200 10*3/MM3 (ref 140–450)
PMV BLD AUTO: 11.1 FL (ref 6–12)
RBC # BLD AUTO: 4.39 10*6/MM3 (ref 4.14–5.8)
WBC NRBC COR # BLD: 6.46 10*3/MM3 (ref 3.4–10.8)

## 2023-04-29 LAB
GAMMA INTERFERON BACKGROUND BLD IA-ACNC: 0.07 IU/ML
M TB IFN-G BLD-IMP: NEGATIVE
M TB IFN-G CD4+ T-CELLS BLD-ACNC: 0.04 IU/ML
M TBIFN-G CD4+ CD8+T-CELLS BLD-ACNC: 0.03 IU/ML
MITOGEN IGNF BLD-ACNC: 4.2 IU/ML
QUANTIFERON INCUBATION: NORMAL
SERVICE CMNT-IMP: NORMAL

## 2023-04-30 LAB
A FLAVUS AB SER QL ID: NEGATIVE
A FUMIGATUS AB SER QL ID: NEGATIVE
A NIGER AB SER QL ID: NEGATIVE
B DERMAT AB TITR SER: NEGATIVE {TITER}

## 2023-05-02 LAB
B HENSELAE IGG TITR SER IF: NEGATIVE TITER
B HENSELAE IGM TITR SER IF: NEGATIVE TITER
B QUINTANA IGG TITR SER IF: NEGATIVE TITER
B QUINTANA IGM TITR SER IF: NEGATIVE TITER

## 2023-05-04 ENCOUNTER — OFFICE VISIT (OUTPATIENT)
Dept: CARDIOLOGY | Facility: CLINIC | Age: 73
End: 2023-05-04
Payer: MEDICARE

## 2023-05-04 VITALS
HEART RATE: 75 BPM | BODY MASS INDEX: 23.19 KG/M2 | SYSTOLIC BLOOD PRESSURE: 112 MMHG | RESPIRATION RATE: 18 BRPM | WEIGHT: 162 LBS | OXYGEN SATURATION: 100 % | HEIGHT: 70 IN | DIASTOLIC BLOOD PRESSURE: 56 MMHG

## 2023-05-04 DIAGNOSIS — R10.9 ABDOMINAL PAIN, UNSPECIFIED ABDOMINAL LOCATION: ICD-10-CM

## 2023-05-04 DIAGNOSIS — R00.2 PALPITATIONS: ICD-10-CM

## 2023-05-04 DIAGNOSIS — I10 ESSENTIAL HYPERTENSION: ICD-10-CM

## 2023-05-04 DIAGNOSIS — E78.5 DYSLIPIDEMIA: ICD-10-CM

## 2023-05-04 DIAGNOSIS — I25.10 CORONARY ARTERY DISEASE INVOLVING NATIVE CORONARY ARTERY OF NATIVE HEART WITHOUT ANGINA PECTORIS: Primary | ICD-10-CM

## 2023-05-04 RX ORDER — OLMESARTAN MEDOXOMIL 20 MG/1
20 TABLET ORAL DAILY
Qty: 90 TABLET | Refills: 3 | Status: SHIPPED | OUTPATIENT
Start: 2023-05-04

## 2023-05-04 NOTE — PROGRESS NOTES
[facility]    Encounter Date: 2023    Patient ID: Alfredito Marcus is a 73 y.o. male.  : 1950     PCP: Pastora Kulkarni MD       Chief Complaint: Follow-up (Palpitations)      PROBLEM LIST:  1. Coronary artery disease:  a. Unstable angina with abnormal Cardiolite stress test, 2008.  b. [facility], 2008, Dr. Garcia: 99% LAD stenosis s/p 2 overlapping Cypher LIZZETH with balloon angioplasty of D1. Rest of the vessels is free from significant disease and LV function was normal.  c. Cardiolite stress test, 2011: Mild apical ischemia. EF 64%.  d. [facility], 2011, Dr. Garcia: Patent stents of the LAD, no significant disease. Normal EF.  e. [facility], 2016, Dr. Edouard: Non-flow-limiting CAD. Widely patent LAD stents. Mild (20%-30%) disease in the LAD and RCA. Normal EF.  f. Echocardiogram, 2020: EF 56-60%. LVDF consistent with impaired relaxation. No significant valvular heart disease. No pericardial effusion.   g. [facility], 2021, Dr. Garcia: EF 65%. 90% stenosis of mid LAD stented with 2.5 x 18 mm LIZZETH and reduced to 0%. Patent stents of the proximal to mid LAD. Residual nonobstructive disease of the distal LAD, the first marginal and the RCA.   h. Echocardiogram, 2022; EF 61-65%. Grade I diastolic dysfunction. Trace MR.   2. Bilateral carotid bruits  a. Carotid duplex, 2020: No hemodynamically single plaques or stenoses were demonstrated in carotid systems. Both vertebral artery flows are antegrade.  b. Carotid CT Angiogram, 10/16/2022; Minimal atherosclerotic disease at the carotid bifurcations. There is no carotid or vertebral arterial stenosis or dissection in the neck. Negative intracranial CTA.  3. Palpitations  a. Event monitor, 06/15/2021; SR. Rare APCs. Occasional PVCs with couplets and triplets. Rare SVT up to 17 beats.   4. Hypertension.  5. Dyslipidemia.  6. Osteoarthritis.  7. Gout.  8. History of gluten intolerance.  9. H/O degenerative  "disc disease.  10. Closed fracture of proximal end of left humerus.  11. Surgical history  a. Cholecystectomy  b. Total replacement of rt hip  c. Appendectomy     History of Present Illness  Patient presents today for a follow-up with a history of coronary artery disease, palpitations, and cardiac risk factors. Since last visit, reports chest pressure associated with facial perspiration during physical activity specifically walking and doing things around the house. He notes his blood pressure drops at this time as well. He does experience occaional palpitations. He's never been prescribed a beta-blocker to his knowledge. He's lost 6 lb in the past 2 months unintentionally and mentions he doesn't eat much. When he bends over his abdomen feels \"full\" and asks if it's possible he may have an aneurysm. Patient otherwise denies shortness of breath, edema, dizziness, and syncope.        Allergies   Allergen Reactions   • Shrimp (Diagnostic) Other (See Comments)     Lip swelling and hives   • Iodine Unknown - Low Severity         Current Outpatient Medications:   •  aspirin 81 MG EC tablet, Take 1 tablet by mouth Daily., Disp: , Rfl:   •  calcium carbonate (OS-HIRAM) 600 MG tablet, Take 1 tablet by mouth 2 (Two) Times a Day With Meals., Disp: , Rfl:   •  cetirizine (zyrTEC) 10 MG tablet, Take 1 tablet by mouth Daily., Disp: , Rfl:   •  clopidogrel (PLAVIX) 75 MG tablet, TAKE 1 TABLET BY MOUTH DAILY., Disp: 90 tablet, Rfl: 3  •  Coenzyme Q10 (COQ10 PO), Take 100 mg by mouth daily., Disp: , Rfl:   •  docusate sodium (COLACE) 100 MG capsule, Take 3 capsules by mouth Every Night., Disp: , Rfl:   •  folic acid (FOLVITE) 1 MG tablet, Take 1 tablet by mouth Daily., Disp: , Rfl:   •  JANUVIA 100 MG tablet, Take 1 tablet by mouth Daily., Disp: , Rfl:   •  lansoprazole (PREVACID) 30 MG capsule, Take 1 capsule by mouth Daily., Disp: , Rfl:   •  latanoprost (XALATAN) 0.005 % ophthalmic solution, Administer 1 drop to the right eye Every " "Night., Disp: , Rfl:   •  meclizine (ANTIVERT) 25 MG tablet, Take 1 tablet by mouth 3 (Three) Times a Day As Needed for Dizziness., Disp: 20 tablet, Rfl: 0  •  multivitamin with minerals tablet tablet, Take 1 tablet by mouth Daily., Disp: , Rfl:   •  nitroglycerin (NITROSTAT) 0.4 MG SL tablet, 1 under the tongue as needed for angina, may repeat q5mins for up three doses, Disp: 100 tablet, Rfl: 1  •  olmesartan (BENICAR) 20 MG tablet, Take 1 tablet by mouth Daily., Disp: 90 tablet, Rfl: 3  •  Omega-3-Acid Eth Est, Dietary, 1 G capsule, Take 1 capsule by mouth 2 (Two) Times a Day., Disp: , Rfl:   •  repaglinide (PRANDIN) 1 MG tablet, Take 1 tablet by mouth 2 (Two) Times a Day Before Meals., Disp: , Rfl:   •  rosuvastatin (CRESTOR) 20 MG tablet, Take 1 tablet by mouth Daily., Disp: , Rfl:   •  traMADol HCl, ER Biphasic, (RYZOLT) 200 MG tablet sustained-release 24 hour, Take 100 mg by mouth Daily., Disp: , Rfl:     The following portions of the patient's history were reviewed and updated as appropriate: allergies, current medications, past family history, past medical history, past social history, past surgical history and problem list.    Review of Systems   12 point ROS negative except for that listed in the HPI.        Objective:     /56 (BP Location: Right arm, Patient Position: Sitting, Cuff Size: Adult)   Pulse 75   Resp 18   Ht 177.8 cm (70\")   Wt 73.5 kg (162 lb)   SpO2 100%   BMI 23.24 kg/m²    BP repeat: 108/60    Physical Exam  Constitutional: Patient appears well-developed and well-nourished.   HENT: HEENT exam unremarkable.   Neck: Neck supple. No JVD present. No carotid bruits.   Cardiovascular: Normal rate, regular rhythm and normal heart sounds. No murmur heard.   2+ symmetric pulses.   Pulmonary/Chest: Breath sounds normal. Does not exhibit tenderness.   Abdominal: Abdomen benign.  Palpable abdominal aorta.  Musculoskeletal: Does not exhibit edema.   Neurological: Neurological exam " unremarkable.   Vitals reviewed.    Data Review:     Lab date: 1/23/2023  • FLP: TC 90, TG 94, HDL 39, LDL 33  • HbA1C: 7.3%    Lab Results   Component Value Date    GLUCOSE 158 (H) 10/16/2022    BUN 22 10/16/2022    CREATININE 1.11 10/16/2022    EGFR 69.7 10/16/2022    BCR 19.8 10/16/2022    K 4.0 10/16/2022    CO2 27.3 10/16/2022    CALCIUM 9.2 10/16/2022    ALBUMIN 4.35 10/16/2022    AST 24 10/16/2022    ALT 20 10/16/2022      Lab Results   Component Value Date    WBC 6.46 04/27/2023    RBC 4.39 04/27/2023    HGB 14.8 04/27/2023    HCT 41.8 04/27/2023    MCV 95.2 04/27/2023     04/27/2023        Procedures           Assessment:      Diagnosis Plan   1. Coronary artery disease involving native coronary artery of native heart without angina pectoris  Recent onset of exertional chest heaviness; further evaluate with MPS. Continue aspirin and Plavix for DAPT      2. Palpitations  48-hour monitor to rule out any arrhythmias       3. Abdominal pain/discomfort  Abdominal US to screen for abdominal aortic aneurysm due to discomfort/fullness      4. Essential hypertension  Decrease olmesartan from 40 mg to 20 mg daily due to low BP      5. Dyslipidemia  Well controlled; continue rosuvastatin 20 mg         Plan:   48hr monitor to rule out arrhythmias as source of patient's palpitations.   Cardiolite stress test to be done in [place] for Dr. Huizar to read.   US abdominal aorta in [place] to screen for AAA.   Decrease olmesartan from 40 mg to 20 mg daily due to low blood pressure.   Continue current medications.   FU in 3 MO, sooner as needed.  Thank you for allowing us to participate in the care of your patient.     Scribed for Grady Garcia MD by Hailey Carrillo. 5/4/2023 14:08 EDT            I, Grady Garcia MD, personally performed the services described in this documentation as scribed by the above named individual in my presence, and it is both accurate and complete.  5/4/2023  14:48 EDT      Please note that  portions of this note may have been completed with a voice recognition program. Efforts were made to edit the dictations, but occasionally words are mistranscribed.

## 2023-05-18 ENCOUNTER — TELEPHONE (OUTPATIENT)
Dept: CARDIOLOGY | Facility: CLINIC | Age: 73
End: 2023-05-18
Payer: MEDICARE

## 2023-05-18 NOTE — TELEPHONE ENCOUNTER
Called patient to relay normal holter results. Patient verbalized understanding. Awaiting stress test.

## 2023-06-12 ENCOUNTER — HOSPITAL ENCOUNTER (OUTPATIENT)
Dept: ULTRASOUND IMAGING | Facility: HOSPITAL | Age: 73
Discharge: HOME | End: 2023-06-12
Payer: MEDICARE

## 2023-06-12 ENCOUNTER — HOSPITAL ENCOUNTER (OUTPATIENT)
Dept: NUCLEAR MEDICINE | Facility: HOSPITAL | Age: 73
Discharge: HOME | End: 2023-06-12
Payer: MEDICARE

## 2023-06-12 ENCOUNTER — HOSPITAL ENCOUNTER (OUTPATIENT)
Dept: CARDIOLOGY | Facility: HOSPITAL | Age: 73
Discharge: HOME | End: 2023-06-12
Payer: MEDICARE

## 2023-06-12 DIAGNOSIS — E78.5 DYSLIPIDEMIA: ICD-10-CM

## 2023-06-12 DIAGNOSIS — I10 ESSENTIAL HYPERTENSION: ICD-10-CM

## 2023-06-12 DIAGNOSIS — R10.9 ABDOMINAL PAIN, UNSPECIFIED ABDOMINAL LOCATION: ICD-10-CM

## 2023-06-12 DIAGNOSIS — R00.2 PALPITATIONS: ICD-10-CM

## 2023-06-12 DIAGNOSIS — I25.10 CORONARY ARTERY DISEASE INVOLVING NATIVE CORONARY ARTERY OF NATIVE HEART WITHOUT ANGINA PECTORIS: ICD-10-CM

## 2023-06-12 LAB
BH CV NUCLEAR PRIOR STUDY: 3
BH CV REST NUCLEAR ISOTOPE DOSE: 9.5 MCI
BH CV STRESS BP STAGE 1: NORMAL
BH CV STRESS DURATION MIN STAGE 1: 3
BH CV STRESS DURATION SEC STAGE 1: 0
BH CV STRESS GRADE STAGE 1: 10
BH CV STRESS HR STAGE 1: 123
BH CV STRESS METS STAGE 1: 5
BH CV STRESS NUCLEAR ISOTOPE DOSE: 27.5 MCI
BH CV STRESS PROTOCOL 1: NORMAL
BH CV STRESS RECOVERY BP: NORMAL MMHG
BH CV STRESS RECOVERY HR: 70 BPM
BH CV STRESS SPEED STAGE 1: 1.7
BH CV STRESS STAGE 1: 1
MAXIMAL PREDICTED HEART RATE: 145 BPM
PERCENT MAX PREDICTED HR: 84.83 %
STRESS BASELINE BP: NORMAL MMHG
STRESS BASELINE HR: 84 BPM
STRESS PERCENT HR: 100 %
STRESS POST ESTIMATED WORKLOAD: 4.6 METS
STRESS POST EXERCISE DUR MIN: 3 MIN
STRESS POST PEAK BP: NORMAL MMHG
STRESS POST PEAK HR: 123 BPM
STRESS TARGET HR: 123 BPM

## 2023-06-12 PROCEDURE — A9500 TC99M SESTAMIBI: HCPCS | Performed by: INTERNAL MEDICINE

## 2023-06-12 PROCEDURE — 93017 CV STRESS TEST TRACING ONLY: CPT

## 2023-06-12 PROCEDURE — 78452 HT MUSCLE IMAGE SPECT MULT: CPT

## 2023-06-12 PROCEDURE — 76706 US ABDL AORTA SCREEN AAA: CPT | Performed by: RADIOLOGY

## 2023-06-12 PROCEDURE — 0 TECHNETIUM SESTAMIBI: Performed by: INTERNAL MEDICINE

## 2023-06-12 PROCEDURE — 76706 US ABDL AORTA SCREEN AAA: CPT

## 2023-06-12 RX ADMIN — TECHNETIUM TC 99M SESTAMIBI 1 DOSE: 1 INJECTION INTRAVENOUS at 11:05

## 2023-06-12 RX ADMIN — TECHNETIUM TC 99M SESTAMIBI 1 DOSE: 1 INJECTION INTRAVENOUS at 12:40

## 2023-06-16 ENCOUNTER — TELEPHONE (OUTPATIENT)
Dept: CARDIOLOGY | Facility: CLINIC | Age: 73
End: 2023-06-16
Payer: MEDICARE

## 2023-06-16 NOTE — TELEPHONE ENCOUNTER
----- Message from Grady Garcia MD sent at 6/15/2023  2:48 PM EDT -----  Please notify him that his stress test was within normal limits, no exercise related arrhythmias were noted.  His abdominal ultrasound did not show any aneurysm and only mild plaque was noted.  These are satisfactory reports.  Continue same treatment and keep scheduled appointment for follow-up.

## 2023-07-22 ENCOUNTER — HOSPITAL ENCOUNTER (EMERGENCY)
Facility: HOSPITAL | Age: 73
Discharge: HOME | End: 2023-07-22
Attending: STUDENT IN AN ORGANIZED HEALTH CARE EDUCATION/TRAINING PROGRAM | Admitting: STUDENT IN AN ORGANIZED HEALTH CARE EDUCATION/TRAINING PROGRAM
Payer: MEDICARE

## 2023-07-22 ENCOUNTER — APPOINTMENT (OUTPATIENT)
Dept: CT IMAGING | Facility: HOSPITAL | Age: 73
End: 2023-07-22
Payer: MEDICARE

## 2023-07-22 ENCOUNTER — APPOINTMENT (OUTPATIENT)
Dept: GENERAL RADIOLOGY | Facility: HOSPITAL | Age: 73
End: 2023-07-22
Payer: MEDICARE

## 2023-07-22 VITALS
OXYGEN SATURATION: 100 % | SYSTOLIC BLOOD PRESSURE: 112 MMHG | RESPIRATION RATE: 16 BRPM | HEART RATE: 53 BPM | DIASTOLIC BLOOD PRESSURE: 84 MMHG | BODY MASS INDEX: 23.2 KG/M2 | WEIGHT: 162.04 LBS | HEIGHT: 70 IN | TEMPERATURE: 97.8 F

## 2023-07-22 DIAGNOSIS — H11.32 SUBCONJUNCTIVAL HEMORRHAGE OF LEFT EYE: Primary | ICD-10-CM

## 2023-07-22 LAB
ABO GROUP BLD: NORMAL
ALBUMIN SERPL-MCNC: 5 G/DL (ref 3.5–5.2)
ALBUMIN/GLOB SERPL: 1.7 G/DL
ALP SERPL-CCNC: 85 U/L (ref 39–117)
ALT SERPL W P-5'-P-CCNC: 25 U/L (ref 1–41)
ANION GAP SERPL CALCULATED.3IONS-SCNC: 11.8 MMOL/L (ref 5–15)
APTT PPP: 31.6 SECONDS (ref 26.5–34.5)
AST SERPL-CCNC: 27 U/L (ref 1–40)
BASOPHILS # BLD AUTO: 0.06 10*3/MM3 (ref 0–0.2)
BASOPHILS NFR BLD AUTO: 0.9 % (ref 0–1.5)
BILIRUB CONJ SERPL-MCNC: 0.3 MG/DL (ref 0–0.3)
BILIRUB SERPL-MCNC: 1.6 MG/DL (ref 0–1.2)
BLD GP AB SCN SERPL QL: NEGATIVE
BUN SERPL-MCNC: 16 MG/DL (ref 8–23)
BUN/CREAT SERPL: 14.5 (ref 7–25)
CALCIUM SPEC-SCNC: 10.1 MG/DL (ref 8.6–10.5)
CHLORIDE SERPL-SCNC: 99 MMOL/L (ref 98–107)
CO2 SERPL-SCNC: 28.2 MMOL/L (ref 22–29)
CREAT BLDA-MCNC: 1.2 MG/DL (ref 0.6–1.3)
CREAT SERPL-MCNC: 1.1 MG/DL (ref 0.76–1.27)
DEPRECATED RDW RBC AUTO: 42.4 FL (ref 37–54)
EGFRCR SERPLBLD CKD-EPI 2021: 70 ML/MIN/1.73
EOSINOPHIL # BLD AUTO: 0.27 10*3/MM3 (ref 0–0.4)
EOSINOPHIL NFR BLD AUTO: 4 % (ref 0.3–6.2)
ERYTHROCYTE [DISTWIDTH] IN BLOOD BY AUTOMATED COUNT: 12.5 % (ref 12.3–15.4)
GLOBULIN UR ELPH-MCNC: 2.9 GM/DL
GLUCOSE BLDC GLUCOMTR-MCNC: 177 MG/DL (ref 70–130)
GLUCOSE SERPL-MCNC: 198 MG/DL (ref 65–99)
HCT VFR BLD AUTO: 46.1 % (ref 37.5–51)
HGB BLD-MCNC: 15.8 G/DL (ref 13–17.7)
HOLD SPECIMEN: NORMAL
HOLD SPECIMEN: NORMAL
IMM GRANULOCYTES # BLD AUTO: 0.04 10*3/MM3 (ref 0–0.05)
IMM GRANULOCYTES NFR BLD AUTO: 0.6 % (ref 0–0.5)
INR PPP: 0.89 (ref 0.9–1.1)
LYMPHOCYTES # BLD AUTO: 1.76 10*3/MM3 (ref 0.7–3.1)
LYMPHOCYTES NFR BLD AUTO: 26.3 % (ref 19.6–45.3)
MCH RBC QN AUTO: 31.9 PG (ref 26.6–33)
MCHC RBC AUTO-ENTMCNC: 34.3 G/DL (ref 31.5–35.7)
MCV RBC AUTO: 93.1 FL (ref 79–97)
MONOCYTES # BLD AUTO: 0.66 10*3/MM3 (ref 0.1–0.9)
MONOCYTES NFR BLD AUTO: 9.9 % (ref 5–12)
NEUTROPHILS NFR BLD AUTO: 3.91 10*3/MM3 (ref 1.7–7)
NEUTROPHILS NFR BLD AUTO: 58.3 % (ref 42.7–76)
NRBC BLD AUTO-RTO: 0 /100 WBC (ref 0–0.2)
PLATELET # BLD AUTO: 206 10*3/MM3 (ref 140–450)
PMV BLD AUTO: 10.7 FL (ref 6–12)
POTASSIUM SERPL-SCNC: 4.5 MMOL/L (ref 3.5–5.2)
PROT SERPL-MCNC: 7.9 G/DL (ref 6–8.5)
PROTHROMBIN TIME: 12.5 SECONDS (ref 12.1–14.7)
RBC # BLD AUTO: 4.95 10*6/MM3 (ref 4.14–5.8)
RH BLD: POSITIVE
SODIUM SERPL-SCNC: 139 MMOL/L (ref 136–145)
T&S EXPIRATION DATE: NORMAL
TROPONIN T SERPL HS-MCNC: 15 NG/L
WBC NRBC COR # BLD: 6.7 10*3/MM3 (ref 3.4–10.8)
WHOLE BLOOD HOLD COAG: NORMAL
WHOLE BLOOD HOLD SPECIMEN: NORMAL

## 2023-07-22 PROCEDURE — 82565 ASSAY OF CREATININE: CPT

## 2023-07-22 PROCEDURE — 86901 BLOOD TYPING SEROLOGIC RH(D): CPT | Performed by: STUDENT IN AN ORGANIZED HEALTH CARE EDUCATION/TRAINING PROGRAM

## 2023-07-22 PROCEDURE — 82948 REAGENT STRIP/BLOOD GLUCOSE: CPT

## 2023-07-22 PROCEDURE — 84484 ASSAY OF TROPONIN QUANT: CPT | Performed by: STUDENT IN AN ORGANIZED HEALTH CARE EDUCATION/TRAINING PROGRAM

## 2023-07-22 PROCEDURE — 93010 ELECTROCARDIOGRAM REPORT: CPT | Performed by: SPECIALIST

## 2023-07-22 PROCEDURE — 71045 X-RAY EXAM CHEST 1 VIEW: CPT

## 2023-07-22 PROCEDURE — 86900 BLOOD TYPING SEROLOGIC ABO: CPT | Performed by: STUDENT IN AN ORGANIZED HEALTH CARE EDUCATION/TRAINING PROGRAM

## 2023-07-22 PROCEDURE — 80053 COMPREHEN METABOLIC PANEL: CPT | Performed by: STUDENT IN AN ORGANIZED HEALTH CARE EDUCATION/TRAINING PROGRAM

## 2023-07-22 PROCEDURE — 85025 COMPLETE CBC W/AUTO DIFF WBC: CPT | Performed by: STUDENT IN AN ORGANIZED HEALTH CARE EDUCATION/TRAINING PROGRAM

## 2023-07-22 PROCEDURE — 93005 ELECTROCARDIOGRAM TRACING: CPT | Performed by: STUDENT IN AN ORGANIZED HEALTH CARE EDUCATION/TRAINING PROGRAM

## 2023-07-22 PROCEDURE — 86850 RBC ANTIBODY SCREEN: CPT | Performed by: STUDENT IN AN ORGANIZED HEALTH CARE EDUCATION/TRAINING PROGRAM

## 2023-07-22 PROCEDURE — 82248 BILIRUBIN DIRECT: CPT | Performed by: STUDENT IN AN ORGANIZED HEALTH CARE EDUCATION/TRAINING PROGRAM

## 2023-07-22 PROCEDURE — 25510000001 IOPAMIDOL PER 1 ML: Performed by: STUDENT IN AN ORGANIZED HEALTH CARE EDUCATION/TRAINING PROGRAM

## 2023-07-22 PROCEDURE — 85610 PROTHROMBIN TIME: CPT | Performed by: STUDENT IN AN ORGANIZED HEALTH CARE EDUCATION/TRAINING PROGRAM

## 2023-07-22 PROCEDURE — 70496 CT ANGIOGRAPHY HEAD: CPT

## 2023-07-22 PROCEDURE — 70498 CT ANGIOGRAPHY NECK: CPT

## 2023-07-22 PROCEDURE — 0042T HC CT CEREBRAL PERFUSION W/WO CONTRAST: CPT

## 2023-07-22 PROCEDURE — 36415 COLL VENOUS BLD VENIPUNCTURE: CPT

## 2023-07-22 PROCEDURE — 85730 THROMBOPLASTIN TIME PARTIAL: CPT | Performed by: STUDENT IN AN ORGANIZED HEALTH CARE EDUCATION/TRAINING PROGRAM

## 2023-07-22 PROCEDURE — 70450 CT HEAD/BRAIN W/O DYE: CPT

## 2023-07-22 PROCEDURE — 99284 EMERGENCY DEPT VISIT MOD MDM: CPT

## 2023-07-22 RX ORDER — PROPARACAINE HYDROCHLORIDE 5 MG/ML
1 SOLUTION/ DROPS OPHTHALMIC ONCE
Status: COMPLETED | OUTPATIENT
Start: 2023-07-22 | End: 2023-07-22

## 2023-07-22 RX ORDER — SODIUM CHLORIDE 0.9 % (FLUSH) 0.9 %
10 SYRINGE (ML) INJECTION AS NEEDED
Status: DISCONTINUED | OUTPATIENT
Start: 2023-07-22 | End: 2023-07-22 | Stop reason: HOSPADM

## 2023-07-22 RX ORDER — ERYTHROMYCIN 5 MG/G
1 OINTMENT OPHTHALMIC ONCE
Status: COMPLETED | OUTPATIENT
Start: 2023-07-22 | End: 2023-07-22

## 2023-07-22 RX ORDER — ERYTHROMYCIN 5 MG/G
OINTMENT OPHTHALMIC
Qty: 3.5 G | Refills: 0 | Status: SHIPPED | OUTPATIENT
Start: 2023-07-22 | End: 2023-07-29

## 2023-07-22 RX ADMIN — PROPARACAINE HYDROCHLORIDE 1 DROP: 5 SOLUTION/ DROPS OPHTHALMIC at 16:00

## 2023-07-22 RX ADMIN — ERYTHROMYCIN 1 APPLICATION: 5 OINTMENT OPHTHALMIC at 16:49

## 2023-07-22 RX ADMIN — IOPAMIDOL 60 ML: 755 INJECTION, SOLUTION INTRAVENOUS at 15:20

## 2023-07-22 RX ADMIN — IOPAMIDOL 60 ML: 755 INJECTION, SOLUTION INTRAVENOUS at 15:21

## 2023-07-22 NOTE — ED PROVIDER NOTES
Subjective   History of Present Illness  Patient is a 73-year-old male with history significant for CAD status post PCI, type 2 diabetes mellitus who comes to the ER for dizziness and left eye redness/swelling.  Patient states this happened about an hour and a half prior to presentation.  He started noticing swelling in the left eye with in his eye became bloody.  He complains of a left-sided headache and some dizziness.  Initially upon evaluation in triage, code stroke was called.  After further evaluation and discussion, code stroke was canceled.  He says he initially had some blurred vision earlier today but this is since resolved.  No pain with eye movements.  Denies any chest pain/pressure or tightness.    Review of Systems   Constitutional:  Negative for chills, fatigue and fever.   HENT:  Negative for congestion, sinus pain and sore throat.    Eyes:  Positive for redness.   Respiratory:  Negative for cough, chest tightness, shortness of breath and wheezing.    Cardiovascular:  Negative for chest pain, palpitations and leg swelling.   Gastrointestinal:  Negative for abdominal pain, constipation, diarrhea, nausea and vomiting.   Genitourinary:  Negative for dysuria, frequency, hematuria and urgency.   Musculoskeletal:  Negative for arthralgias and myalgias.   Neurological:  Positive for dizziness. Negative for numbness and headaches.   Psychiatric/Behavioral:  Negative for confusion.      Past Medical History:   Diagnosis Date    Arthritis     CAD (coronary artery disease)     Closed fracture of proximal end of left humerus 09/03/2016    Diabetes     Dyslipidemia     GERD (gastroesophageal reflux disease)     Gout     H/O degenerative disc disease     Degenerative disc disease of the cervical and lumbar spine/chronic back pain    Heart disease     History of gluten intolerance     Hypertension     Myocardial infarction     Osteoarthritis        Allergies   Allergen Reactions    Shrimp (Diagnostic) Other (See  Comments)     Lip swelling and hives    Iodine Unknown - Low Severity       Past Surgical History:   Procedure Laterality Date    APPENDECTOMY      CARDIAC CATHETERIZATION      CARDIAC CATHETERIZATION Left 04/28/2021    Procedure: Left Heart Cath;  Surgeon: Grady Garcia MD;  Location: [facility] CATH INVASIVE LOCATION;  Service: Cardiology;  Laterality: Left;    CORONARY ANGIOPLASTY WITH STENT PLACEMENT      CYSTOSCOPY TRANSURETHRAL RESECTION OF PROSTATE      HEMORRHOIDECTOMY      LUMBAR FUSION      REPLACEMENT TOTAL HIP LATERAL POSITION Right 2018       Family History   Problem Relation Age of Onset    Arthritis Mother     Hypertension Mother     Arthritis Father     Cancer Father     Diabetes Sister     Cancer Son        Social History     Socioeconomic History    Marital status:    Tobacco Use    Smoking status: Never     Passive exposure: Past    Smokeless tobacco: Never   Vaping Use    Vaping Use: Never used   Substance and Sexual Activity    Alcohol use: Not Currently    Drug use: Never    Sexual activity: Defer           Objective   Physical Exam  Vitals and nursing note reviewed. Exam conducted with a chaperone present.   Constitutional:       General: He is not in acute distress.     Appearance: He is well-developed and normal weight. He is not ill-appearing.   HENT:      Head: Normocephalic.      Comments: Hyphema, right     Mouth/Throat:      Mouth: Mucous membranes are moist.      Pharynx: Oropharynx is clear.   Eyes:      Extraocular Movements: Extraocular movements intact.      Pupils: Pupils are equal, round, and reactive to light.   Neck:      Vascular: No JVD.   Cardiovascular:      Rate and Rhythm: Normal rate and regular rhythm.      Heart sounds: No murmur heard.    No friction rub. No gallop.   Pulmonary:      Effort: Pulmonary effort is normal. No tachypnea.      Breath sounds: Normal breath sounds. No decreased breath sounds, wheezing, rhonchi or rales.   Abdominal:      General: Bowel  sounds are normal.      Palpations: Abdomen is soft.   Musculoskeletal:         General: Normal range of motion.      Cervical back: Normal range of motion and neck supple.      Right lower leg: No edema.      Left lower leg: No edema.   Skin:     General: Skin is warm and dry.      Capillary Refill: Capillary refill takes less than 2 seconds.   Neurological:      General: No focal deficit present.      Mental Status: He is alert and oriented to person, place, and time.   Psychiatric:         Mood and Affect: Mood normal.         Behavior: Behavior normal.       Procedures           ED Course  ED Course as of 07/22/23 1647   Sat Jul 22, 2023   1622 ECG 12 Lead Stroke Evaluation  Normal sinus rhythm, rate 63, QTc 425, no acute ST or T wave changes [CW]      ED Course User Index  [CW] Georges Doran,                                            Medical Decision Making  --code stroke called in triage, patient examined, code stroke cancelled by myself  --CTH negative  --CTA negative  --CT perfusion study negative  --Attempted to obtain intraocular pressures with tonometer however could not get an accurate reading.  After discussing with ophthalmology, consistent with subconjunctival hemorrhage  --will give erythromycin twice daily x1 week, follow-up with ophthalmology in 2 to 4 weeks    Amount and/or Complexity of Data Reviewed  Labs: ordered.  Radiology: ordered.  ECG/medicine tests: ordered.    Risk  Prescription drug management.        Final diagnoses:   Subconjunctival hemorrhage of left eye       ED Disposition  ED Disposition       ED Disposition   Discharge    Condition   Stable    Comment   --               Pastora Kulkarni MD  [address] [facility] [address]  [phone]          [facility]  [address] [address]  [phone]  In 2 weeks           Medication List        New Prescriptions      erythromycin 5 MG/GM ophthalmic ointment  Commonly known as:  ROMYCIN  Administer  into the left eye Every 4 (Four) Hours While Awake for 7 days.               Where to Get Your Medications        These medications were sent to [facility] - [place] - [address] - [phone]  - [phone] FX  [address] [place] [address]      Phone: [phone]   erythromycin 5 MG/GM ophthalmic ointment            Georges Doran DO  07/22/23 6703

## 2023-07-23 LAB
QT INTERVAL: 416 MS
QTC INTERVAL: 425 MS

## 2023-08-08 DIAGNOSIS — M25.552 LEFT HIP PAIN: Primary | ICD-10-CM

## 2023-08-18 ENCOUNTER — TRANSCRIBE ORDERS (OUTPATIENT)
Dept: ADMINISTRATIVE | Facility: HOSPITAL | Age: 73
End: 2023-08-18
Payer: MEDICARE

## 2023-08-18 DIAGNOSIS — M25.552 LEFT HIP PAIN: Primary | ICD-10-CM

## 2023-08-22 ENCOUNTER — HOSPITAL ENCOUNTER (OUTPATIENT)
Dept: GENERAL RADIOLOGY | Facility: HOSPITAL | Age: 73
Discharge: HOME | End: 2023-08-22
Admitting: INTERNAL MEDICINE
Payer: MEDICARE

## 2023-08-22 ENCOUNTER — TRANSCRIBE ORDERS (OUTPATIENT)
Dept: ADMINISTRATIVE | Facility: HOSPITAL | Age: 73
End: 2023-08-22
Payer: MEDICARE

## 2023-08-22 DIAGNOSIS — M25.552 LEFT HIP PAIN: ICD-10-CM

## 2023-08-22 DIAGNOSIS — M54.2 CERVICALGIA: ICD-10-CM

## 2023-08-22 DIAGNOSIS — M54.16 LUMBAR RADICULAR PAIN: Primary | ICD-10-CM

## 2023-08-22 PROCEDURE — 73502 X-RAY EXAM HIP UNI 2-3 VIEWS: CPT

## 2023-09-12 ENCOUNTER — HOSPITAL ENCOUNTER (OUTPATIENT)
Dept: MRI IMAGING | Facility: HOSPITAL | Age: 73
Discharge: HOME | End: 2023-09-12
Payer: MEDICARE

## 2023-09-12 DIAGNOSIS — M54.16 LUMBAR RADICULAR PAIN: ICD-10-CM

## 2023-09-12 DIAGNOSIS — M54.2 CERVICALGIA: ICD-10-CM

## 2023-09-12 LAB — CREAT BLDA-MCNC: 1.2 MG/DL (ref 0.6–1.3)

## 2023-09-12 PROCEDURE — 82565 ASSAY OF CREATININE: CPT

## 2023-09-12 PROCEDURE — 0 GADOBENATE DIMEGLUMINE 529 MG/ML SOLUTION: Performed by: NURSE PRACTITIONER

## 2023-09-12 PROCEDURE — 72141 MRI NECK SPINE W/O DYE: CPT

## 2023-09-12 PROCEDURE — A9577 INJ MULTIHANCE: HCPCS | Performed by: NURSE PRACTITIONER

## 2023-09-12 PROCEDURE — 72158 MRI LUMBAR SPINE W/O & W/DYE: CPT

## 2023-09-12 RX ADMIN — GADOBENATE DIMEGLUMINE 15 ML: 529 INJECTION, SOLUTION INTRAVENOUS at 15:20

## 2023-09-26 ENCOUNTER — OFFICE VISIT (OUTPATIENT)
Dept: CARDIOLOGY | Facility: CLINIC | Age: 73
End: 2023-09-26
Payer: MEDICARE

## 2023-09-26 VITALS
HEIGHT: 70 IN | WEIGHT: 166.2 LBS | OXYGEN SATURATION: 98 % | HEART RATE: 61 BPM | DIASTOLIC BLOOD PRESSURE: 62 MMHG | SYSTOLIC BLOOD PRESSURE: 116 MMHG | BODY MASS INDEX: 23.79 KG/M2

## 2023-09-26 DIAGNOSIS — R00.2 PALPITATIONS: ICD-10-CM

## 2023-09-26 DIAGNOSIS — E78.5 DYSLIPIDEMIA: ICD-10-CM

## 2023-09-26 DIAGNOSIS — I25.10 CORONARY ARTERY DISEASE INVOLVING NATIVE CORONARY ARTERY OF NATIVE HEART WITHOUT ANGINA PECTORIS: Primary | ICD-10-CM

## 2023-09-26 DIAGNOSIS — I10 ESSENTIAL HYPERTENSION: ICD-10-CM

## 2023-09-26 RX ORDER — FEXOFENADINE HCL 180 MG/1
180 TABLET ORAL DAILY
COMMUNITY

## 2023-09-26 RX ORDER — OLMESARTAN MEDOXOMIL 40 MG/1
40 TABLET ORAL DAILY
COMMUNITY

## 2023-09-26 RX ORDER — PANTOPRAZOLE SODIUM 40 MG/1
40 TABLET, DELAYED RELEASE ORAL DAILY
COMMUNITY

## 2023-09-26 RX ORDER — TRAMADOL HYDROCHLORIDE 50 MG/1
50 TABLET ORAL AS NEEDED
COMMUNITY

## 2023-09-26 NOTE — PROGRESS NOTES
[facility] Cardiology    Encounter Date: 2023    Patient ID: Alfredito Marcus is a 73 y.o. male.  : 1950     PCP: Pastora Kulkarni MD       Chief Complaint: Coronary Artery Disease and Palpitations      PROBLEM LIST:  Coronary artery disease:  Unstable angina with abnormal Cardiolite stress test, 2008.  [facility], 2008, Dr. Garcia: 99% LAD stenosis s/p 2 overlapping Cypher LIZZETH with balloon angioplasty of D1. Rest of the vessels is free from significant disease and LV function was normal.  Cardiolite stress test, 2011: Mild apical ischemia. EF 64%.  [facility], 2011, Dr. Garcia: Patent stents of the LAD, no significant disease. Normal EF.  [facility], 2016, Dr. Edouard: Non-flow-limiting CAD. Widely patent LAD stents. Mild (20%-30%) disease in the LAD and RCA. Normal EF.  Echocardiogram, 2020: EF 56-60%. LVDF consistent with impaired relaxation. No significant valvular heart disease. No pericardial effusion.   [facility], 2021, Dr. Garcia: EF 65%. 90% stenosis of mid LAD stented with 2.5 x 18 mm LIZZETH and reduced to 0%. Patent stents of the proximal to mid LAD. Residual nonobstructive disease of the distal LAD, the first marginal and the RCA.   Echocardiogram, 2022; EF 61-65%. Grade I diastolic dysfunction. Trace MR.   Stress test 2023: normal stress test with no evidence of ischemia  Bilateral carotid bruits  Carotid duplex, 2020: No hemodynamically single plaques or stenoses were demonstrated in carotid systems. Both vertebral artery flows are antegrade.  Carotid CT Angiogram, 10/16/2022; Minimal atherosclerotic disease at the carotid bifurcations. There is no carotid or vertebral arterial stenosis or dissection in the neck. Negative intracranial CTA.  Palpitations  Event monitor, 06/15/2021; SR. Rare APCs. Occasional PVCs with couplets and triplets. Rare SVT up to 17 beats.   Holter 2023: rare PAC and PVC, one 13 beat run of SVT/PAT, no  symptoms  AAA screening  Abdominal US 6/12/2023:Infrarenal abdominal aortic ectasia of 2.7 cm. No aneurysm.   Hypertension.  Dyslipidemia.  Osteoarthritis.  Gout.  History of gluten intolerance.  H/O degenerative disc disease.  Closed fracture of proximal end of left humerus.  Surgical history  Cholecystectomy  Total replacement of rt hip  Appendectomy    History of Present Illness  Patient presents today for a 1 year follow-up with a history of oronary artery disease, palpitations, and cardiac risk factors. Since last visit, patient has been doing well overall from a cardiovascular standpoint. He did have an ER visit for an eye issue. He was found with hemorrhage in his eye. He did not have any changes in his vision or any other complaints. He does question if he can come off of his plavix and continue aspirin alone. He has been doing well from a cardiac standpoint. Patient denies chest pain, shortness of breath, orthopnea, palpitations, edema, dizziness, and syncope. BP has been well controlled at home.       Allergies   Allergen Reactions    Shrimp (Diagnostic) Other (See Comments)     Lip swelling and hives    Iodine Unknown - Low Severity         Current Outpatient Medications:     aspirin 81 MG EC tablet, Take 1 tablet by mouth Daily., Disp: , Rfl:     calcium carbonate (OS-HIRAM) 600 MG tablet, Take 1 tablet by mouth 2 (Two) Times a Day With Meals., Disp: , Rfl:     cetirizine (zyrTEC) 10 MG tablet, Take 1 tablet by mouth Daily., Disp: , Rfl:     Coenzyme Q10 (CoQ10) 100 MG capsule, Take 1 capsule by mouth Daily., Disp: , Rfl:     docusate sodium (COLACE) 100 MG capsule, Take 3 capsules by mouth As Needed., Disp: , Rfl:     fexofenadine (ALLEGRA) 180 MG tablet, Take 1 tablet by mouth Daily., Disp: , Rfl:     folic acid (FOLVITE) 1 MG tablet, Take 1 tablet by mouth Daily., Disp: , Rfl:     JANUVIA 100 MG tablet, Take 1 tablet by mouth Daily., Disp: , Rfl:     latanoprost (XALATAN) 0.005 % ophthalmic solution,  "Administer 1 drop to the right eye Every Night., Disp: , Rfl:     meclizine (ANTIVERT) 25 MG tablet, Take 1 tablet by mouth 3 (Three) Times a Day As Needed for Dizziness., Disp: 20 tablet, Rfl: 0    multivitamin with minerals tablet tablet, Take 1 tablet by mouth Daily., Disp: , Rfl:     nitroglycerin (NITROSTAT) 0.4 MG SL tablet, 1 under the tongue as needed for angina, may repeat q5mins for up three doses, Disp: 100 tablet, Rfl: 1    olmesartan (Benicar) 40 MG tablet, Take 1 tablet by mouth Daily., Disp: , Rfl:     Omega-3-Acid Eth Est, Dietary, 1 G capsule, Take 1 capsule by mouth 2 (Two) Times a Day., Disp: , Rfl:     pantoprazole (PROTONIX) 40 MG EC tablet, Take 1 tablet by mouth Daily., Disp: , Rfl:     repaglinide (PRANDIN) 1 MG tablet, Take 1 tablet by mouth 3 (Three) Times a Day Before Meals., Disp: , Rfl:     rosuvastatin (CRESTOR) 20 MG tablet, Take 1 tablet by mouth Daily., Disp: , Rfl:     traMADol (ULTRAM) 50 MG tablet, Take 1 tablet by mouth As Needed for Moderate Pain., Disp: , Rfl:     traMADol HCl, ER Biphasic, (RYZOLT) 200 MG tablet sustained-release 24 hour, Take 100 mg by mouth Daily., Disp: , Rfl:     The following portions of the patient's history were reviewed and updated as appropriate: allergies, current medications, past family history, past medical history, past social history, past surgical history and problem list.    ROS  Review of Systems   14 point ROS negative except for that listed in the HPI.         Objective:     /62 (BP Location: Left arm, Patient Position: Sitting)   Pulse 61   Ht 177.8 cm (70\")   Wt 75.4 kg (166 lb 3.2 oz)   SpO2 98%   BMI 23.85 kg/m²      Physical Exam  Constitutional: Patient appears well-developed and well-nourished.   HENT: HEENT exam unremarkable.   Neck: Neck supple. No JVD present. No carotid bruits.   Cardiovascular: Normal rate, regular rhythm and normal heart sounds. No murmur heard.   2+ symmetric pulses.   Pulmonary/Chest: Breath sounds " normal. Does not exhibit tenderness.   Abdominal: Abdomen benign.   Musculoskeletal: Does not exhibit edema.   Neurological: Neurological exam unremarkable.   Vitals reviewed.    Data Review:   Lab Results   Component Value Date    GLUCOSE 198 (H) 07/22/2023    BUN 16 07/22/2023    CREATININE 1.20 09/12/2023    EGFR 70.0 07/22/2023    BCR 14.5 07/22/2023     07/22/2023    K 4.5 07/22/2023    CO2 28.2 07/22/2023    CALCIUM 10.1 07/22/2023    ALBUMIN 5.0 07/22/2023    AST 27 07/22/2023    ALT 25 07/22/2023     Lab Results   Component Value Date    CHLPL 116 05/30/2016    TRIG 195 (H) 05/30/2016    HDL 24 (L) 05/30/2016    LDL 62 05/30/2016      Lab Results   Component Value Date    WBC 6.70 07/22/2023    RBC 4.95 07/22/2023    HGB 15.8 07/22/2023    HCT 46.1 07/22/2023    MCV 93.1 07/22/2023     07/22/2023     Lab Results   Component Value Date    TSH 2.239 08/16/2016     Lab Results   Component Value Date    HGBA1C 6.4 (H) 05/30/2016        Procedures             Assessment:      Diagnosis Plan   1. Coronary artery disease involving native coronary artery of native heart without angina pectoris  Stable cardiac status. No angina or CHF symptoms.       2. Palpitations  Holter 5/4/2023: rare PAC and PVC, one 13 beat run of SVT/PAT, no symptoms      3. Essential hypertension  Well controlled. Continue current antihypertensive regimen.       4. Dyslipidemia  Well controlled. Continue statin therapy.        Plan:   Stable cardiac status, no angina or CHF symptoms.   Okay to discontinue plavix as he is > 6 months post PCI.  Continue current medications.   FU in 6 MO, sooner as needed.  Thank you for allowing us to participate in the care of your patient.       aRchelle Mayo PA-C'    Please note that portions of this note may have been completed with a voice recognition program. Efforts were made to edit the dictations, but occasionally words are mistranscribed.

## 2023-11-13 ENCOUNTER — HOSPITAL ENCOUNTER (OUTPATIENT)
Dept: GENERAL RADIOLOGY | Facility: HOSPITAL | Age: 73
Discharge: HOME | End: 2023-11-13
Admitting: INTERNAL MEDICINE
Payer: MEDICARE

## 2023-11-13 VITALS
RESPIRATION RATE: 18 BRPM | HEART RATE: 82 BPM | BODY MASS INDEX: 23.19 KG/M2 | WEIGHT: 162 LBS | SYSTOLIC BLOOD PRESSURE: 122 MMHG | OXYGEN SATURATION: 97 % | HEIGHT: 70 IN | DIASTOLIC BLOOD PRESSURE: 70 MMHG

## 2023-11-13 DIAGNOSIS — M16.12 PRIMARY OSTEOARTHRITIS OF LEFT HIP: ICD-10-CM

## 2023-11-13 PROCEDURE — 25010000002 LIDOCAINE 1 % SOLUTION: Performed by: RADIOLOGY

## 2023-11-13 PROCEDURE — 25010000002 TRIAMCINOLONE PER 10 MG: Performed by: RADIOLOGY

## 2023-11-13 PROCEDURE — 25010000002 BUPIVACAINE 0.5 % SOLUTION: Performed by: RADIOLOGY

## 2023-11-13 PROCEDURE — 25510000001 IOPAMIDOL 61 % SOLUTION: Performed by: RADIOLOGY

## 2023-11-13 PROCEDURE — 77002 NEEDLE LOCALIZATION BY XRAY: CPT

## 2023-11-13 RX ORDER — TRIAMCINOLONE ACETONIDE 40 MG/ML
80 INJECTION, SUSPENSION INTRA-ARTICULAR; INTRAMUSCULAR ONCE
Status: COMPLETED | OUTPATIENT
Start: 2023-11-13 | End: 2023-11-13

## 2023-11-13 RX ORDER — LIDOCAINE HYDROCHLORIDE 10 MG/ML
10 INJECTION, SOLUTION INFILTRATION; PERINEURAL ONCE
Status: COMPLETED | OUTPATIENT
Start: 2023-11-13 | End: 2023-11-13

## 2023-11-13 RX ORDER — BUPIVACAINE HYDROCHLORIDE 5 MG/ML
5 INJECTION, SOLUTION PERINEURAL ONCE
Status: COMPLETED | OUTPATIENT
Start: 2023-11-13 | End: 2023-11-13

## 2023-11-13 RX ADMIN — TRIAMCINOLONE ACETONIDE 80 MG: 40 INJECTION, SUSPENSION INTRA-ARTICULAR; INTRAMUSCULAR at 12:56

## 2023-11-13 RX ADMIN — LIDOCAINE HYDROCHLORIDE 10 ML: 10 INJECTION, SOLUTION INFILTRATION; PERINEURAL at 12:55

## 2023-11-13 RX ADMIN — IOPAMIDOL 3 ML: 612 INJECTION, SOLUTION INTRAVENOUS at 12:56

## 2023-11-13 RX ADMIN — BUPIVACAINE HYDROCHLORIDE 5 ML: 5 INJECTION, SOLUTION PERINEURAL at 12:55

## 2023-12-04 ENCOUNTER — TRANSCRIBE ORDERS (OUTPATIENT)
Dept: ADMINISTRATIVE | Facility: HOSPITAL | Age: 73
End: 2023-12-04
Payer: MEDICARE

## 2023-12-04 DIAGNOSIS — R10.13 PAIN, ABDOMINAL, EPIGASTRIC: Primary | ICD-10-CM

## 2023-12-11 ENCOUNTER — HOSPITAL ENCOUNTER (OUTPATIENT)
Dept: CT IMAGING | Facility: HOSPITAL | Age: 73
Discharge: HOME | End: 2023-12-11
Admitting: NURSE PRACTITIONER
Payer: MEDICARE

## 2023-12-11 DIAGNOSIS — R10.13 PAIN, ABDOMINAL, EPIGASTRIC: ICD-10-CM

## 2023-12-11 LAB — CREAT BLDA-MCNC: 1.3 MG/DL (ref 0.6–1.3)

## 2023-12-11 PROCEDURE — 25510000001 IOPAMIDOL 61 % SOLUTION: Performed by: NURSE PRACTITIONER

## 2023-12-11 PROCEDURE — 82565 ASSAY OF CREATININE: CPT

## 2023-12-11 PROCEDURE — 74177 CT ABD & PELVIS W/CONTRAST: CPT

## 2023-12-11 RX ADMIN — IOPAMIDOL 80 ML: 612 INJECTION, SOLUTION INTRAVENOUS at 09:36

## 2024-02-06 ENCOUNTER — OFFICE VISIT (OUTPATIENT)
Dept: SURGERY | Facility: CLINIC | Age: 74
End: 2024-02-06
Payer: MEDICARE

## 2024-02-06 VITALS — WEIGHT: 164 LBS | HEIGHT: 70 IN | BODY MASS INDEX: 23.48 KG/M2

## 2024-02-06 DIAGNOSIS — R10.31 RLQ ABDOMINAL PAIN: Primary | ICD-10-CM

## 2024-02-06 PROCEDURE — 1160F RVW MEDS BY RX/DR IN RCRD: CPT

## 2024-02-06 PROCEDURE — 1159F MED LIST DOCD IN RCRD: CPT

## 2024-02-06 PROCEDURE — 99213 OFFICE O/P EST LOW 20 MIN: CPT

## 2024-02-06 RX ORDER — BUPIVACAINE HYDROCHLORIDE 5 MG/ML
5 INJECTION, SOLUTION PERINEURAL ONCE
Status: SHIPPED | OUTPATIENT
Start: 2024-02-06

## 2024-02-06 RX ORDER — BUPIVACAINE HYDROCHLORIDE 5 MG/ML
5 INJECTION, SOLUTION PERINEURAL ONCE
Status: DISCONTINUED | OUTPATIENT
Start: 2024-02-06 | End: 2024-02-06

## 2024-02-06 NOTE — PROGRESS NOTES
Subjective   Alfredito Marcus is a 74 y.o. male who presents today for Initial Evaluation    Chief Complaint:    Chief Complaint   Patient presents with    Abdominal Pain        History of Present Illness:    History of Present Illness Alfredito is a 74-year-old male who presents for complaints of right lower quadrant abdominal pain.  He reports that this pain developed insidiously approximately several months ago.  He reports that he has had extreme right lower quadrant pain that has interfered with his sleep.  Reports that he had an appendectomy approximately 16 years ago.  States that the pain is along the scar line to his right lower quadrant.  He reports daily bowel movements.  He did have a CT scan of his abdomen and pelvis in December that revealed a large stool burden as well as a left inguinal hernia and a left renal cyst.  He reports that he has been having daily bowel movements and has been placed on Linzess and then switched to Trulance.  He recently had a colonoscopy several days ago and reports that this was normal.  Patient states that after having bowel movements and bowel prep that he remains having pain to his right lower quadrant.  Denies any worsening or alleviating factors.  Reports that the pain is constant in nature.  Denies any known injuries.    The following portions of the patient's history were reviewed and updated as appropriate: allergies, current medications, past family history, past medical history, past social history, past surgical history and problem list.    Past Medical History:  Past Medical History:   Diagnosis Date    Arthritis     CAD (coronary artery disease)     Closed fracture of proximal end of left humerus 09/03/2016    Diabetes     Dyslipidemia     GERD (gastroesophageal reflux disease)     Gout     H/O degenerative disc disease     Degenerative disc disease of the cervical and lumbar spine/chronic back pain    Heart disease     History of gluten intolerance      Hypertension     Myocardial infarction     Osteoarthritis        Social History:  Social History     Socioeconomic History    Marital status:    Tobacco Use    Smoking status: Never     Passive exposure: Past    Smokeless tobacco: Never   Vaping Use    Vaping Use: Never used   Substance and Sexual Activity    Alcohol use: Not Currently    Drug use: Never    Sexual activity: Defer       Family History:  Family History   Problem Relation Age of Onset    Arthritis Mother     Hypertension Mother     Arthritis Father     Cancer Father     Diabetes Sister     Cancer Son        Past Surgical History:  Past Surgical History:   Procedure Laterality Date    APPENDECTOMY      CARDIAC CATHETERIZATION      CARDIAC CATHETERIZATION Left 04/28/2021    Procedure: Left Heart Cath;  Surgeon: Grady Garcia MD;  Location:  [facility];  Service: Cardiology;  Laterality: Left;    CORONARY ANGIOPLASTY WITH STENT PLACEMENT      CYSTOSCOPY TRANSURETHRAL RESECTION OF PROSTATE      HEMORRHOIDECTOMY      HUMERUS SURGERY Left 2016    LUMBAR FUSION      REPLACEMENT TOTAL HIP LATERAL POSITION Right 2018       Problem List:  Patient Active Problem List   Diagnosis    CAD (coronary artery disease)    Hypertension    Dyslipidemia    Osteoarthritis    Gout    History of gluten intolerance    H/O degenerative disc disease    Iron deficiency anemia, unspecified    GERD (gastroesophageal reflux disease)    Lymph node symptom    Angina pectoris       Allergy:   Allergies   Allergen Reactions    Shrimp (Diagnostic) Other (See Comments)     Lip swelling and hives    Iodine Unknown - Low Severity        Current Medications:   Current Outpatient Medications   Medication Sig Dispense Refill    aspirin 81 MG EC tablet Take 1 tablet by mouth Daily.      calcium carbonate (OS-HIRAM) 600 MG tablet Take 1 tablet by mouth 2 (Two) Times a Day With Meals.      cetirizine (zyrTEC) 10 MG tablet Take 1 tablet by mouth Daily.      Coenzyme Q10  (CoQ10) 100 MG capsule Take 1 capsule by mouth Daily.      docusate sodium (COLACE) 100 MG capsule Take 3 capsules by mouth As Needed.      fexofenadine (ALLEGRA) 180 MG tablet Take 1 tablet by mouth Daily.      folic acid (FOLVITE) 1 MG tablet Take 1 tablet by mouth Daily.      JANUVIA 100 MG tablet Take 1 tablet by mouth Daily.      latanoprost (XALATAN) 0.005 % ophthalmic solution Administer 1 drop to the right eye Every Night.      meclizine (ANTIVERT) 25 MG tablet Take 1 tablet by mouth 3 (Three) Times a Day As Needed for Dizziness. 20 tablet 0    multivitamin with minerals tablet tablet Take 1 tablet by mouth Daily.      nitroglycerin (NITROSTAT) 0.4 MG SL tablet 1 under the tongue as needed for angina, may repeat q5mins for up three doses 100 tablet 1    olmesartan (Benicar) 40 MG tablet Take 1 tablet by mouth Daily.      Omega-3-Acid Eth Est, Dietary, 1 G capsule Take 1 capsule by mouth 2 (Two) Times a Day.      pantoprazole (PROTONIX) 40 MG EC tablet Take 1 tablet by mouth Daily.      repaglinide (PRANDIN) 1 MG tablet Take 1 tablet by mouth 3 (Three) Times a Day Before Meals.      rosuvastatin (CRESTOR) 20 MG tablet Take 1 tablet by mouth Daily.      traMADol (ULTRAM) 50 MG tablet Take 1 tablet by mouth As Needed for Moderate Pain.      traMADol HCl, ER Biphasic, (RYZOLT) 200 MG tablet sustained-release 24 hour Take 100 mg by mouth Daily.       Current Facility-Administered Medications   Medication Dose Route Frequency Provider Last Rate Last Admin    bupivacaine (MARCAINE) 0.5 % injection 5 mL  5 mL Injection Once Marky López, ELSY           Review of Systems:    Review of Systems   Constitutional:  Negative for activity change.   HENT:  Negative for congestion.    Eyes:  Negative for blurred vision.   Respiratory:  Negative for shortness of breath.    Cardiovascular:  Negative for chest pain.   Gastrointestinal:  Positive for abdominal pain.   Endocrine: Negative for cold intolerance.  "  Genitourinary:  Negative for flank pain.   Musculoskeletal:  Negative for arthralgias.   Skin:  Negative for bruise.   Allergic/Immunologic: Negative for environmental allergies.   Neurological:  Negative for confusion.   Hematological:  Negative for adenopathy.   Psychiatric/Behavioral:  Negative for agitation.          Physical Exam:   Physical Exam  Constitutional:       Appearance: Normal appearance.   HENT:      Head: Normocephalic and atraumatic.      Right Ear: External ear normal.      Left Ear: External ear normal.   Eyes:      Conjunctiva/sclera: Conjunctivae normal.      Pupils: Pupils are equal, round, and reactive to light.   Cardiovascular:      Pulses: Normal pulses.   Pulmonary:      Effort: Pulmonary effort is normal.      Breath sounds: Normal breath sounds.   Abdominal:      General: Abdomen is flat. There is no distension.      Palpations: Abdomen is soft.      Tenderness: There is abdominal tenderness. There is no guarding.      Comments: Scar to RLQ   Musculoskeletal:         General: Normal range of motion.      Cervical back: Normal range of motion and neck supple.   Skin:     General: Skin is warm and dry.      Capillary Refill: Capillary refill takes less than 2 seconds.   Neurological:      General: No focal deficit present.      Mental Status: He is alert and oriented to person, place, and time.   Psychiatric:         Mood and Affect: Mood normal.         Behavior: Behavior normal.         Vitals:  Height 177.8 cm (70\"), weight 74.4 kg (164 lb).   Body mass index is 23.53 kg/m².     Imaging:   CT Abdomen Pelvis With Contrast  Narrative: EXAM: CT ABDOMEN PELVIS W CONTRAST-         TECHNIQUE: Multiple axial CT images were obtained from lung bases  through pubic symphysis WITH administration of IV contrast. Reformatted  images in the coronal and/or sagittal plane(s) were generated from the  axial data set to facilitate diagnostic accuracy and/or surgical  planning.  Oral Contrast: " ADMINISTERED     Radiation dose reduction techniques were utilized per ALARA protocol.  Automated exposure control was initiated through either or CareDose or  DoseRight software packages by  protocol.    DOSE:     Clinical information r10.13; R10.13-Epigastric pain      Comparison 8/27/2021     FINDINGS:     Lower thorax: Clear. No effusions.     Abdomen:     Liver: Homogeneous. No focal hepatic mass or ductal dilatation.     Gallbladder: Surgically absent     Pancreas: Unremarkable. No mass or ductal dilatation.     Spleen: Homogeneous. No splenomegaly.     Adrenals: No mass.     Kidneys/ureters: Large left renal cyst     GI tract: Large stool burden.     MESENTERY: No free fluid, walled off fluid collections, mesenteric  stranding, or enlarged lymph nodes        Vasculature: No evidence of aneurysm.     Abdominal wall: Fat-containing left inguinal hernia     Bladder: No focal mass or significant wall thickening     Reproductive: Unremarkable as visualized     Bones: Postoperative change     Impression:    1. Large stool burden     2. Large multi lobulated left renal cyst, similar to the prior study     3. Other findings as above                          This report was finalized on 12/11/2023 9:42 AM by Dr. Gerard Salinas MD.           Assessment & Plan   Diagnoses and all orders for this visit:    1. RLQ abdominal pain (Primary)  -     Discontinue: bupivacaine (MARCAINE) 0.5 % injection 5 mL  -     bupivacaine (MARCAINE) 0.5 % injection 5 mL      Alfredito is a 74-year-old male presents with complaints of right lower quadrant abdominal pain.  I discussed this case with Dr. Edmonds.  Plan will be for patient to return to office Friday for a nerve block performed under ultrasound guidance by Dr. Edmonds.  Patient verbalized understanding and agrees with plan.  We will anticipate the response to reveal whether patient is having abdominal wall pain or physiologic pain.    Visit Diagnoses:    ICD-10-CM ICD-9-CM    1. RLQ abdominal pain  R10.31 789.03         MEDS ORDERED DURING VISIT:  New Medications Ordered This Visit   Medications    bupivacaine (MARCAINE) 0.5 % injection 5 mL       Return for Follow-up Friday.             This document has been electronically signed by ELSY Crawford  February 6, 2024 14:52 EST    Please note that portions of this note were completed with a voice recognition program.

## 2024-02-11 ENCOUNTER — HOSPITAL ENCOUNTER (OUTPATIENT)
Facility: HOSPITAL | Age: 74
Setting detail: OBSERVATION
Discharge: ANOTHER HOSPITAL | End: 2024-02-12
Attending: STUDENT IN AN ORGANIZED HEALTH CARE EDUCATION/TRAINING PROGRAM | Admitting: HOSPITALIST
Payer: MEDICARE

## 2024-02-11 ENCOUNTER — APPOINTMENT (OUTPATIENT)
Dept: GENERAL RADIOLOGY | Facility: HOSPITAL | Age: 74
End: 2024-02-11
Payer: MEDICARE

## 2024-02-11 DIAGNOSIS — E80.6 HYPERBILIRUBINEMIA: ICD-10-CM

## 2024-02-11 DIAGNOSIS — K59.00 CONSTIPATION, UNSPECIFIED CONSTIPATION TYPE: ICD-10-CM

## 2024-02-11 DIAGNOSIS — I10 UNCONTROLLED HYPERTENSION: ICD-10-CM

## 2024-02-11 DIAGNOSIS — I25.118 CORONARY ARTERY DISEASE OF NATIVE ARTERY OF NATIVE HEART WITH STABLE ANGINA PECTORIS: Primary | ICD-10-CM

## 2024-02-11 PROBLEM — R07.9 CHEST PAIN: Status: ACTIVE | Noted: 2024-02-11

## 2024-02-11 LAB
ALBUMIN SERPL-MCNC: 4.8 G/DL (ref 3.5–5.2)
ALBUMIN/GLOB SERPL: 2 G/DL
ALP SERPL-CCNC: 86 U/L (ref 39–117)
ALT SERPL W P-5'-P-CCNC: 27 U/L (ref 1–41)
ANION GAP SERPL CALCULATED.3IONS-SCNC: 10.8 MMOL/L (ref 5–15)
AST SERPL-CCNC: 28 U/L (ref 1–40)
BASOPHILS # BLD AUTO: 0.06 10*3/MM3 (ref 0–0.2)
BASOPHILS NFR BLD AUTO: 0.9 % (ref 0–1.5)
BILIRUB CONJ SERPL-MCNC: 0.3 MG/DL (ref 0–0.3)
BILIRUB SERPL-MCNC: 1.8 MG/DL (ref 0–1.2)
BUN SERPL-MCNC: 13 MG/DL (ref 8–23)
BUN/CREAT SERPL: 12 (ref 7–25)
CALCIUM SPEC-SCNC: 9.4 MG/DL (ref 8.6–10.5)
CHLORIDE SERPL-SCNC: 101 MMOL/L (ref 98–107)
CO2 SERPL-SCNC: 27.2 MMOL/L (ref 22–29)
CREAT SERPL-MCNC: 1.08 MG/DL (ref 0.76–1.27)
DEPRECATED RDW RBC AUTO: 43.3 FL (ref 37–54)
EGFRCR SERPLBLD CKD-EPI 2021: 71.1 ML/MIN/1.73
EOSINOPHIL # BLD AUTO: 0.19 10*3/MM3 (ref 0–0.4)
EOSINOPHIL NFR BLD AUTO: 2.8 % (ref 0.3–6.2)
ERYTHROCYTE [DISTWIDTH] IN BLOOD BY AUTOMATED COUNT: 12.3 % (ref 12.3–15.4)
GEN 5 2HR TROPONIN T REFLEX: 16 NG/L
GLOBULIN UR ELPH-MCNC: 2.4 GM/DL
GLUCOSE BLDC GLUCOMTR-MCNC: 98 MG/DL (ref 70–130)
GLUCOSE SERPL-MCNC: 237 MG/DL (ref 65–99)
HCT VFR BLD AUTO: 46.7 % (ref 37.5–51)
HGB BLD-MCNC: 16.1 G/DL (ref 13–17.7)
HOLD SPECIMEN: NORMAL
HOLD SPECIMEN: NORMAL
IMM GRANULOCYTES # BLD AUTO: 0.05 10*3/MM3 (ref 0–0.05)
IMM GRANULOCYTES NFR BLD AUTO: 0.7 % (ref 0–0.5)
INR PPP: 0.94 (ref 0.9–1.1)
LYMPHOCYTES # BLD AUTO: 1.62 10*3/MM3 (ref 0.7–3.1)
LYMPHOCYTES NFR BLD AUTO: 24.3 % (ref 19.6–45.3)
MCH RBC QN AUTO: 33.1 PG (ref 26.6–33)
MCHC RBC AUTO-ENTMCNC: 34.5 G/DL (ref 31.5–35.7)
MCV RBC AUTO: 96.1 FL (ref 79–97)
MONOCYTES # BLD AUTO: 0.64 10*3/MM3 (ref 0.1–0.9)
MONOCYTES NFR BLD AUTO: 9.6 % (ref 5–12)
NEUTROPHILS NFR BLD AUTO: 4.11 10*3/MM3 (ref 1.7–7)
NEUTROPHILS NFR BLD AUTO: 61.7 % (ref 42.7–76)
NRBC BLD AUTO-RTO: 0 /100 WBC (ref 0–0.2)
PLATELET # BLD AUTO: 184 10*3/MM3 (ref 140–450)
PMV BLD AUTO: 10.5 FL (ref 6–12)
POTASSIUM SERPL-SCNC: 4.6 MMOL/L (ref 3.5–5.2)
PROT SERPL-MCNC: 7.2 G/DL (ref 6–8.5)
PROTHROMBIN TIME: 13.1 SECONDS (ref 12.1–14.7)
QT INTERVAL: 354 MS
QTC INTERVAL: 447 MS
RBC # BLD AUTO: 4.86 10*6/MM3 (ref 4.14–5.8)
SODIUM SERPL-SCNC: 139 MMOL/L (ref 136–145)
TROPONIN T DELTA: -1 NG/L
TROPONIN T SERPL HS-MCNC: 17 NG/L
WBC NRBC COR # BLD AUTO: 6.67 10*3/MM3 (ref 3.4–10.8)
WHOLE BLOOD HOLD COAG: NORMAL
WHOLE BLOOD HOLD SPECIMEN: NORMAL

## 2024-02-11 PROCEDURE — 36415 COLL VENOUS BLD VENIPUNCTURE: CPT

## 2024-02-11 PROCEDURE — G0378 HOSPITAL OBSERVATION PER HR: HCPCS

## 2024-02-11 PROCEDURE — 96372 THER/PROPH/DIAG INJ SC/IM: CPT

## 2024-02-11 PROCEDURE — 71045 X-RAY EXAM CHEST 1 VIEW: CPT

## 2024-02-11 PROCEDURE — 25010000002 ENOXAPARIN PER 10 MG: Performed by: STUDENT IN AN ORGANIZED HEALTH CARE EDUCATION/TRAINING PROGRAM

## 2024-02-11 PROCEDURE — 99223 1ST HOSP IP/OBS HIGH 75: CPT | Performed by: STUDENT IN AN ORGANIZED HEALTH CARE EDUCATION/TRAINING PROGRAM

## 2024-02-11 PROCEDURE — 85025 COMPLETE CBC W/AUTO DIFF WBC: CPT | Performed by: STUDENT IN AN ORGANIZED HEALTH CARE EDUCATION/TRAINING PROGRAM

## 2024-02-11 PROCEDURE — 93010 ELECTROCARDIOGRAM REPORT: CPT | Performed by: SPECIALIST

## 2024-02-11 PROCEDURE — 82248 BILIRUBIN DIRECT: CPT

## 2024-02-11 PROCEDURE — 82948 REAGENT STRIP/BLOOD GLUCOSE: CPT

## 2024-02-11 PROCEDURE — 99285 EMERGENCY DEPT VISIT HI MDM: CPT

## 2024-02-11 PROCEDURE — 84484 ASSAY OF TROPONIN QUANT: CPT | Performed by: STUDENT IN AN ORGANIZED HEALTH CARE EDUCATION/TRAINING PROGRAM

## 2024-02-11 PROCEDURE — 93005 ELECTROCARDIOGRAM TRACING: CPT | Performed by: STUDENT IN AN ORGANIZED HEALTH CARE EDUCATION/TRAINING PROGRAM

## 2024-02-11 PROCEDURE — 85610 PROTHROMBIN TIME: CPT

## 2024-02-11 PROCEDURE — 80053 COMPREHEN METABOLIC PANEL: CPT | Performed by: STUDENT IN AN ORGANIZED HEALTH CARE EDUCATION/TRAINING PROGRAM

## 2024-02-11 RX ORDER — SODIUM CHLORIDE 0.9 % (FLUSH) 0.9 %
10 SYRINGE (ML) INJECTION AS NEEDED
Status: DISCONTINUED | OUTPATIENT
Start: 2024-02-11 | End: 2024-02-12 | Stop reason: HOSPADM

## 2024-02-11 RX ORDER — BISACODYL 5 MG/1
5 TABLET, DELAYED RELEASE ORAL DAILY PRN
Status: DISCONTINUED | OUTPATIENT
Start: 2024-02-11 | End: 2024-02-12 | Stop reason: HOSPADM

## 2024-02-11 RX ORDER — ASPIRIN 81 MG/1
81 TABLET ORAL DAILY
Status: DISCONTINUED | OUTPATIENT
Start: 2024-02-12 | End: 2024-02-12 | Stop reason: HOSPADM

## 2024-02-11 RX ORDER — SODIUM CHLORIDE 9 MG/ML
40 INJECTION, SOLUTION INTRAVENOUS AS NEEDED
Status: DISCONTINUED | OUTPATIENT
Start: 2024-02-11 | End: 2024-02-12 | Stop reason: HOSPADM

## 2024-02-11 RX ORDER — ENOXAPARIN SODIUM 100 MG/ML
40 INJECTION SUBCUTANEOUS NIGHTLY
Status: DISCONTINUED | OUTPATIENT
Start: 2024-02-11 | End: 2024-02-12 | Stop reason: HOSPADM

## 2024-02-11 RX ORDER — UBIDECARENONE 100 MG
100 CAPSULE ORAL DAILY
COMMUNITY
End: 2024-02-12 | Stop reason: HOSPADM

## 2024-02-11 RX ORDER — DIPHENOXYLATE HYDROCHLORIDE AND ATROPINE SULFATE 2.5; .025 MG/1; MG/1
1 TABLET ORAL DAILY
COMMUNITY

## 2024-02-11 RX ORDER — NAPROXEN SODIUM 220 MG
220 TABLET ORAL 2 TIMES DAILY PRN
COMMUNITY

## 2024-02-11 RX ORDER — ASPIRIN 325 MG
325 TABLET ORAL ONCE
Status: COMPLETED | OUTPATIENT
Start: 2024-02-11 | End: 2024-02-11

## 2024-02-11 RX ORDER — ASPIRIN 325 MG
325 TABLET ORAL DAILY
COMMUNITY
End: 2024-02-12 | Stop reason: HOSPADM

## 2024-02-11 RX ORDER — POLYETHYLENE GLYCOL 3350 17 G/17G
17 POWDER, FOR SOLUTION ORAL DAILY PRN
Status: DISCONTINUED | OUTPATIENT
Start: 2024-02-11 | End: 2024-02-12 | Stop reason: HOSPADM

## 2024-02-11 RX ORDER — CLOPIDOGREL BISULFATE 75 MG/1
75 TABLET ORAL DAILY
COMMUNITY

## 2024-02-11 RX ORDER — BISACODYL 10 MG
10 SUPPOSITORY, RECTAL RECTAL DAILY PRN
Status: DISCONTINUED | OUTPATIENT
Start: 2024-02-11 | End: 2024-02-12 | Stop reason: HOSPADM

## 2024-02-11 RX ORDER — ASPIRIN 325 MG
325 TABLET ORAL DAILY
Status: CANCELLED | OUTPATIENT
Start: 2024-02-12

## 2024-02-11 RX ORDER — SODIUM CHLORIDE 0.9 % (FLUSH) 0.9 %
10 SYRINGE (ML) INJECTION EVERY 12 HOURS SCHEDULED
Status: DISCONTINUED | OUTPATIENT
Start: 2024-02-11 | End: 2024-02-12 | Stop reason: HOSPADM

## 2024-02-11 RX ORDER — AMOXICILLIN 250 MG
2 CAPSULE ORAL 2 TIMES DAILY PRN
Status: DISCONTINUED | OUTPATIENT
Start: 2024-02-11 | End: 2024-02-12 | Stop reason: HOSPADM

## 2024-02-11 RX ORDER — OLMESARTAN MEDOXOMIL 20 MG/1
20 TABLET ORAL DAILY
COMMUNITY

## 2024-02-11 RX ADMIN — ASPIRIN 325 MG: 325 TABLET ORAL at 15:22

## 2024-02-11 RX ADMIN — ENOXAPARIN SODIUM 40 MG: 40 INJECTION SUBCUTANEOUS at 21:46

## 2024-02-11 RX ADMIN — Medication 10 ML: at 21:45

## 2024-02-11 NOTE — Clinical Note
Level of Care: Telemetry [5]   Diagnosis: Chest pain [756241]   Certification: I Certify That Inpatient Hospital Services Are Medically Necessary For Greater Than 2 Midnights

## 2024-02-11 NOTE — ED NOTES
[facility] contacted for consult per Bessy CHIN  Juliocesar Ritter will be paged and call returned.

## 2024-02-11 NOTE — ED PROVIDER NOTES
"Subjective   History of Present Illness  74 y.o. male with pMH significant for CAD s/p PCI, HLD, HTN, GERD, gout, osteoarthritis, type II DM presents to [facility] ED w/ CC of chest pain with associated neck tightness, B/L leg weakness, and nausea. Patient states that he has 4 cardiac stents (last stenting approximately 2 years ago). He states that symptoms are similar to prior episodes of chest pain which were the result of a blockage. Patient states that he had similar symptoms 2 day ago and took a nitroglycerin tablet which relieved pain. Today, he states that he did \"a home stress test\" during which time he used a step machine and was able to reproduce cardiac symptoms. He also reports that his blood pressure was significantly elevated which was abnormal for him. After rest, chest pain did improve and he currently only describes minimal chest discomfort which spans from left to right. Patient denies any syncope, dizziness, diaphoresis, vomiting, or other acute symptoms. States that he is compliant with his home medications including DAPT and statin. Follows with Dr. Garcia in [facility] (primary cardiologist).     History provided by:  Patient   used: No        Review of Systems   Constitutional:  Negative for chills, fatigue and fever.   HENT:  Negative for congestion, sore throat and trouble swallowing.    Respiratory:  Negative for cough, shortness of breath and wheezing.    Cardiovascular:  Positive for chest pain (\"heaviness\"). Negative for palpitations and leg swelling.   Gastrointestinal:  Positive for nausea. Negative for abdominal pain, constipation, diarrhea and vomiting.   Genitourinary:  Negative for difficulty urinating, flank pain, frequency and urgency.   Musculoskeletal:  Positive for neck pain (\"tightness\"). Negative for back pain, gait problem and neck stiffness.   Neurological:  Negative for dizziness, tremors, seizures, syncope, facial asymmetry, speech difficulty, weakness, " light-headedness, numbness and headaches.       Past Medical History:   Diagnosis Date    Arthritis     CAD (coronary artery disease)     Closed fracture of proximal end of left humerus 09/03/2016    Diabetes     Dyslipidemia     GERD (gastroesophageal reflux disease)     Gout     H/O degenerative disc disease     Degenerative disc disease of the cervical and lumbar spine/chronic back pain    Heart disease     History of gluten intolerance     Hypertension     Myocardial infarction     Osteoarthritis        Allergies   Allergen Reactions    Shrimp (Diagnostic) Other (See Comments)     Lip swelling and hives    Iodine Unknown - Low Severity       Past Surgical History:   Procedure Laterality Date    APPENDECTOMY      CARDIAC CATHETERIZATION      CARDIAC CATHETERIZATION Left 04/28/2021    Procedure: Left Heart Cath;  Surgeon: Grady Garcia MD;  Location:  [facility];  Service: Cardiology;  Laterality: Left;    CORONARY ANGIOPLASTY WITH STENT PLACEMENT      CYSTOSCOPY TRANSURETHRAL RESECTION OF PROSTATE      HEMORRHOIDECTOMY      HUMERUS SURGERY Left 2016    LUMBAR FUSION      REPLACEMENT TOTAL HIP LATERAL POSITION Right 2018       Family History   Problem Relation Age of Onset    Arthritis Mother     Hypertension Mother     Arthritis Father     Cancer Father     Diabetes Sister     Cancer Son        Social History     Socioeconomic History    Marital status:    Tobacco Use    Smoking status: Never     Passive exposure: Past    Smokeless tobacco: Never   Vaping Use    Vaping Use: Never used   Substance and Sexual Activity    Alcohol use: Not Currently    Drug use: Never    Sexual activity: Defer           Objective   Physical Exam  Vitals reviewed.   Constitutional:       General: He is awake. He is not in acute distress.     Appearance: He is not ill-appearing or diaphoretic.      Comments: Room air.   HENT:      Head: Normocephalic and atraumatic.      Mouth/Throat:      Mouth: Mucous  membranes are moist.      Pharynx: Oropharynx is clear.   Eyes:      Extraocular Movements: Extraocular movements intact.      Pupils: Pupils are equal, round, and reactive to light.   Cardiovascular:      Rate and Rhythm: Normal rate and regular rhythm.      Pulses: Normal pulses.      Heart sounds: Normal heart sounds. No murmur heard.     No friction rub.   Pulmonary:      Effort: Pulmonary effort is normal. No accessory muscle usage, respiratory distress or retractions.      Breath sounds: No wheezing, rhonchi or rales.   Abdominal:      General: Bowel sounds are normal. There is no distension.      Palpations: Abdomen is soft.      Tenderness: There is no abdominal tenderness. There is no guarding.   Musculoskeletal:      Cervical back: Neck supple. No rigidity.      Right lower leg: No edema.      Left lower leg: No edema.   Skin:     General: Skin is warm and dry.      Capillary Refill: Capillary refill takes 2 to 3 seconds.   Neurological:      Mental Status: He is alert and oriented to person, place, and time. Mental status is at baseline.      Cranial Nerves: No dysarthria or facial asymmetry.      Sensory: Sensation is intact.      Motor: No weakness or tremor.   Psychiatric:         Attention and Perception: Attention normal.         Mood and Affect: Mood normal.         Speech: Speech normal.         Behavior: Behavior normal. Behavior is cooperative.         Thought Content: Thought content normal.         Cognition and Memory: Cognition normal.         Judgment: Judgment normal.         Procedures           ED Course  ED Course as of 02/11/24 1813   Sun Feb 11, 2024   1505 EKG notes sinus rhythm.  96 bpm.  No acute ST elevation.  QTc 447.  Electronically signed by Eric Lanza DO, 02/11/24, 3:05 PM EST.   [SF]   5270 Discussed case with Cardiologist Dr. Perez. Recommends admission for observation, hold NPO after midnight. States that patient will likely need cardiac catheterization in the AM.  [MC]    1725 Patient states that he would like to be transferred to [facility] if he will require heart cath since his primary cardiologist is Dr. Garcia at [facility]. Transfer line called; awaiting return call from Dr. Ritter.  []   1804 Per Dr. Ritter at [facility] patient can follow up outpatient with Dr. Garcia due to stable nature of angina and non elevated cardiac enzymes. However, due to elevated BP and patient later reported abd pain/constipation, obs admission felt appropriate. Discussed with Hospitalist Dr. Oquendo, agreeable to admit for obs. Discussed with patient. He is agreeable.  []      ED Course User Index  [MC] Bessy Gaytan APRN  [SF] Eric Lanza DO                HEART Score: 4                  XR Chest 1 View    Result Date: 2/11/2024  Impression: No acute process.   This report was finalized on 2/11/2024 5:00 PM by Evelyn Carpio MD.      Results for orders placed or performed during the hospital encounter of 02/11/24   Comprehensive Metabolic Panel    Specimen: Blood   Result Value Ref Range    Glucose 237 (H) 65 - 99 mg/dL    BUN 13 8 - 23 mg/dL    Creatinine 1.08 0.76 - 1.27 mg/dL    Sodium 139 136 - 145 mmol/L    Potassium 4.6 3.5 - 5.2 mmol/L    Chloride 101 98 - 107 mmol/L    CO2 27.2 22.0 - 29.0 mmol/L    Calcium 9.4 8.6 - 10.5 mg/dL    Total Protein 7.2 6.0 - 8.5 g/dL    Albumin 4.8 3.5 - 5.2 g/dL    ALT (SGPT) 27 1 - 41 U/L    AST (SGOT) 28 1 - 40 U/L    Alkaline Phosphatase 86 39 - 117 U/L    Total Bilirubin 1.8 (H) 0.0 - 1.2 mg/dL    Globulin 2.4 gm/dL    A/G Ratio 2.0 g/dL    BUN/Creatinine Ratio 12.0 7.0 - 25.0    Anion Gap 10.8 5.0 - 15.0 mmol/L    eGFR 71.1 >60.0 mL/min/1.73   High Sensitivity Troponin T    Specimen: Blood   Result Value Ref Range    HS Troponin T 17 <22 ng/L   CBC Auto Differential    Specimen: Blood   Result Value Ref Range    WBC 6.67 3.40 - 10.80 10*3/mm3    RBC 4.86 4.14 - 5.80 10*6/mm3    Hemoglobin 16.1 13.0 - 17.7 g/dL    Hematocrit 46.7 37.5 - 51.0 %     MCV 96.1 79.0 - 97.0 fL    MCH 33.1 (H) 26.6 - 33.0 pg    MCHC 34.5 31.5 - 35.7 g/dL    RDW 12.3 12.3 - 15.4 %    RDW-SD 43.3 37.0 - 54.0 fl    MPV 10.5 6.0 - 12.0 fL    Platelets 184 140 - 450 10*3/mm3    Neutrophil % 61.7 42.7 - 76.0 %    Lymphocyte % 24.3 19.6 - 45.3 %    Monocyte % 9.6 5.0 - 12.0 %    Eosinophil % 2.8 0.3 - 6.2 %    Basophil % 0.9 0.0 - 1.5 %    Immature Grans % 0.7 (H) 0.0 - 0.5 %    Neutrophils, Absolute 4.11 1.70 - 7.00 10*3/mm3    Lymphocytes, Absolute 1.62 0.70 - 3.10 10*3/mm3    Monocytes, Absolute 0.64 0.10 - 0.90 10*3/mm3    Eosinophils, Absolute 0.19 0.00 - 0.40 10*3/mm3    Basophils, Absolute 0.06 0.00 - 0.20 10*3/mm3    Immature Grans, Absolute 0.05 0.00 - 0.05 10*3/mm3    nRBC 0.0 0.0 - 0.2 /100 WBC   High Sensitivity Troponin T 2Hr    Specimen: Arm, Right; Blood   Result Value Ref Range    HS Troponin T 16 <22 ng/L    Troponin T Delta -1 >=-4 - <+4 ng/L   Bilirubin, Direct    Specimen: Blood   Result Value Ref Range    Bilirubin, Direct 0.3 0.0 - 0.3 mg/dL   Protime-INR    Specimen: Blood   Result Value Ref Range    Protime 13.1 12.1 - 14.7 Seconds    INR 0.94 0.90 - 1.10   ECG 12 Lead ED Triage Standing Order; Chest Pain   Result Value Ref Range    QT Interval 354 ms    QTC Interval 447 ms   Green Top (Gel)   Result Value Ref Range    Extra Tube Hold for add-ons.    Lavender Top   Result Value Ref Range    Extra Tube hold for add-on    Gold Top - SST   Result Value Ref Range    Extra Tube Hold for add-ons.    Light Blue Top   Result Value Ref Range    Extra Tube Hold for add-ons.               Medical Decision Making  Cardiac workup overall negative; trop non elevated x2, EKG without acute ischemic changes, chest xray unremarkable. Labs unremarkable apart from hyperbilirubinemia which appears to be chronic. PT/INR non elevated. Direct bili non elevated. BP has been moderately elevated throughout ED course. Due to nature of chest pain and reproducibility with significant cardiac  history, discussed with Cardiology Dr. Perez who recommended admission for observation and possible cardiac cath in the AM. Patient initially requested transfer to [facility] as he follows with Dr. Garcia although contacted Dr. Ritter at [facility] and he stated there were no beds and that patient did not require emergent transfer. Further discussed with patient who was agreeable to admission at this facility. Discussed with Dr. Oquendo who will admit patient for observation and further workup.     Problems Addressed:  Coronary artery disease of native artery of native heart with stable angina pectoris: complicated acute illness or injury    Amount and/or Complexity of Data Reviewed  Labs: ordered. Decision-making details documented in ED Course.  Radiology: ordered. Decision-making details documented in ED Course.  ECG/medicine tests: ordered. Decision-making details documented in ED Course.    Risk  OTC drugs.  Prescription drug management.      Final diagnoses:   Coronary artery disease of native artery of native heart with stable angina pectoris   Constipation, unspecified constipation type   Uncontrolled hypertension   Hyperbilirubinemia       ED Disposition  ED Disposition       ED Disposition   Decision to Admit    Condition   --    Comment   --               No follow-up provider specified.       Medication List      No changes were made to your prescriptions during this visit.            Bessy Gaytan, APRN  02/11/24 1812

## 2024-02-11 NOTE — CASE MANAGEMENT/SOCIAL WORK
Discharge Planning Assessment  [facility]     Patient Name: Alfredito Marcus  MRN: [id number]  Today's Date: 2/11/2024    Admit Date: 2/11/2024    Plan: Pt lives at home alone and plans to return home at discharge pt provides own transportation but states son Rasta Marcus is available to help him if needed. Pcp is Dr Craft, he uses [facility] and has Medicare a+b and [employer]. Pt is independent with adl's and does not use any dme, home health or home o2. Pt denies any needs at this time.   Discharge Needs Assessment       Row Name 02/11/24 1842       Living Environment    People in Home alone    Current Living Arrangements home    Potentially Unsafe Housing Conditions none    Primary Care Provided by self    Family Caregiver if Needed child(richmond), adult    Quality of Family Relationships helpful;involved;supportive    Able to Return to Prior Arrangements yes       Resource/Environmental Concerns    Resource/Environmental Concerns none       Transition Planning    Patient/Family Anticipates Transition to home    Patient/Family Anticipated Services at Transition none    Transportation Anticipated car, drives self;family or friend will provide       Discharge Needs Assessment    Readmission Within the Last 30 Days no previous admission in last 30 days    Equipment Currently Used at Home none    Concerns to be Addressed no discharge needs identified;denies needs/concerns at this time    Anticipated Changes Related to Illness none    Equipment Needed After Discharge none                   Discharge Plan       Row Name 02/11/24 2039       Plan    Plan Pt lives at home alone and plans to return home at discharge pt provides own transportation but states misty Marcus is available to help him if needed. Pcp is Dr Craft, he uses [facility] and has Medicare a+b and [employer]. Pt is independent with adl's and does not use any dme, home health or home o2. Pt denies any needs at this time.     Patient/Family in Agreement with Plan yes                  Continued Care and Services - Admitted Since 2/11/2024    Coordination has not been started for this encounter.       Expected Discharge Date and Time       Expected Discharge Date Expected Discharge Time    Feb 13, 2024            Demographic Summary       Row Name 02/11/24 2833       General Information    Admission Type observation    Arrived From home    Referral Source emergency department    Reason for Consult discharge planning                    Caroline Reynolds RN

## 2024-02-12 ENCOUNTER — HOSPITAL ENCOUNTER (OUTPATIENT)
Facility: HOSPITAL | Age: 74
Discharge: HOME | End: 2024-02-12
Attending: INTERNAL MEDICINE | Admitting: INTERNAL MEDICINE
Payer: MEDICARE

## 2024-02-12 VITALS
SYSTOLIC BLOOD PRESSURE: 134 MMHG | WEIGHT: 157.8 LBS | HEART RATE: 85 BPM | OXYGEN SATURATION: 97 % | DIASTOLIC BLOOD PRESSURE: 70 MMHG | HEIGHT: 70 IN | RESPIRATION RATE: 16 BRPM | BODY MASS INDEX: 22.59 KG/M2 | TEMPERATURE: 98 F

## 2024-02-12 VITALS
TEMPERATURE: 98.6 F | RESPIRATION RATE: 20 BRPM | HEART RATE: 64 BPM | WEIGHT: 158 LBS | DIASTOLIC BLOOD PRESSURE: 69 MMHG | BODY MASS INDEX: 22.62 KG/M2 | OXYGEN SATURATION: 98 % | HEIGHT: 70 IN | SYSTOLIC BLOOD PRESSURE: 120 MMHG

## 2024-02-12 DIAGNOSIS — I25.110 CORONARY ARTERY DISEASE INVOLVING NATIVE CORONARY ARTERY OF NATIVE HEART WITH UNSTABLE ANGINA PECTORIS: Primary | ICD-10-CM

## 2024-02-12 LAB
CHOLEST SERPL-MCNC: 101 MG/DL (ref 0–200)
HDLC SERPL-MCNC: 35 MG/DL (ref 40–60)
LDLC SERPL CALC-MCNC: 39 MG/DL (ref 0–100)
LDLC/HDLC SERPL: 0.96 {RATIO}
TRIGL SERPL-MCNC: 162 MG/DL (ref 0–150)
VLDLC SERPL-MCNC: 27 MG/DL (ref 5–40)

## 2024-02-12 PROCEDURE — 93458 L HRT ARTERY/VENTRICLE ANGIO: CPT | Performed by: INTERNAL MEDICINE

## 2024-02-12 PROCEDURE — 93005 ELECTROCARDIOGRAM TRACING: CPT | Performed by: PHYSICIAN ASSISTANT

## 2024-02-12 PROCEDURE — 25510000001 IOPAMIDOL PER 1 ML: Performed by: INTERNAL MEDICINE

## 2024-02-12 PROCEDURE — C1894 INTRO/SHEATH, NON-LASER: HCPCS | Performed by: INTERNAL MEDICINE

## 2024-02-12 PROCEDURE — 25010000002 MIDAZOLAM PER 1 MG: Performed by: INTERNAL MEDICINE

## 2024-02-12 PROCEDURE — G0378 HOSPITAL OBSERVATION PER HR: HCPCS

## 2024-02-12 PROCEDURE — 93010 ELECTROCARDIOGRAM REPORT: CPT | Performed by: INTERNAL MEDICINE

## 2024-02-12 PROCEDURE — 25810000003 SODIUM CHLORIDE 0.9 % SOLUTION: Performed by: INTERNAL MEDICINE

## 2024-02-12 PROCEDURE — 80061 LIPID PANEL: CPT | Performed by: INTERNAL MEDICINE

## 2024-02-12 PROCEDURE — 99214 OFFICE O/P EST MOD 30 MIN: CPT | Performed by: SPECIALIST

## 2024-02-12 PROCEDURE — 25010000002 HEPARIN (PORCINE) PER 1000 UNITS: Performed by: INTERNAL MEDICINE

## 2024-02-12 PROCEDURE — 99239 HOSP IP/OBS DSCHRG MGMT >30: CPT | Performed by: HOSPITALIST

## 2024-02-12 PROCEDURE — 25010000002 NICARDIPINE 2.5 MG/ML SOLUTION: Performed by: INTERNAL MEDICINE

## 2024-02-12 PROCEDURE — C1769 GUIDE WIRE: HCPCS | Performed by: INTERNAL MEDICINE

## 2024-02-12 PROCEDURE — 25010000002 FENTANYL CITRATE (PF) 50 MCG/ML SOLUTION: Performed by: INTERNAL MEDICINE

## 2024-02-12 RX ORDER — CALCIUM CARBONATE 500(1250)
500 TABLET ORAL DAILY
Status: DISCONTINUED | OUTPATIENT
Start: 2024-02-12 | End: 2024-02-12 | Stop reason: HOSPADM

## 2024-02-12 RX ORDER — NITROGLYCERIN 0.4 MG/1
0.4 TABLET SUBLINGUAL
Status: DISCONTINUED | OUTPATIENT
Start: 2024-02-12 | End: 2024-02-12 | Stop reason: HOSPADM

## 2024-02-12 RX ORDER — ISOSORBIDE MONONITRATE 30 MG/1
30 TABLET, EXTENDED RELEASE ORAL DAILY
Qty: 90 TABLET | Refills: 1 | Status: SHIPPED | OUTPATIENT
Start: 2024-02-12

## 2024-02-12 RX ORDER — LIDOCAINE HYDROCHLORIDE 10 MG/ML
INJECTION, SOLUTION EPIDURAL; INFILTRATION; INTRACAUDAL; PERINEURAL
Status: DISCONTINUED | OUTPATIENT
Start: 2024-02-12 | End: 2024-02-12 | Stop reason: HOSPADM

## 2024-02-12 RX ORDER — ENOXAPARIN SODIUM 100 MG/ML
40 INJECTION SUBCUTANEOUS NIGHTLY
Start: 2024-02-12 | End: 2024-02-12 | Stop reason: HOSPADM

## 2024-02-12 RX ORDER — LOSARTAN POTASSIUM 50 MG/1
50 TABLET ORAL
Status: DISCONTINUED | OUTPATIENT
Start: 2024-02-12 | End: 2024-02-12 | Stop reason: HOSPADM

## 2024-02-12 RX ORDER — SODIUM CHLORIDE 9 MG/ML
100 INJECTION, SOLUTION INTRAVENOUS CONTINUOUS
Status: DISCONTINUED | OUTPATIENT
Start: 2024-02-13 | End: 2024-02-12 | Stop reason: HOSPADM

## 2024-02-12 RX ORDER — PANTOPRAZOLE SODIUM 40 MG/1
40 TABLET, DELAYED RELEASE ORAL DAILY
Status: DISCONTINUED | OUTPATIENT
Start: 2024-02-12 | End: 2024-02-12 | Stop reason: HOSPADM

## 2024-02-12 RX ORDER — MECLIZINE HYDROCHLORIDE 25 MG/1
25 TABLET ORAL 3 TIMES DAILY PRN
Status: ON HOLD
Start: 2024-02-12 | End: 2024-02-12

## 2024-02-12 RX ORDER — SODIUM CHLORIDE 9 MG/ML
40 INJECTION, SOLUTION INTRAVENOUS AS NEEDED
Status: DISCONTINUED | OUTPATIENT
Start: 2024-02-12 | End: 2024-02-12 | Stop reason: HOSPADM

## 2024-02-12 RX ORDER — MIDAZOLAM HYDROCHLORIDE 1 MG/ML
INJECTION INTRAMUSCULAR; INTRAVENOUS
Status: DISCONTINUED | OUTPATIENT
Start: 2024-02-12 | End: 2024-02-12 | Stop reason: HOSPADM

## 2024-02-12 RX ORDER — CLOPIDOGREL BISULFATE 75 MG/1
75 TABLET ORAL DAILY
Status: DISCONTINUED | OUTPATIENT
Start: 2024-02-12 | End: 2024-02-12 | Stop reason: HOSPADM

## 2024-02-12 RX ORDER — SODIUM CHLORIDE 0.9 % (FLUSH) 0.9 %
10 SYRINGE (ML) INJECTION EVERY 12 HOURS SCHEDULED
Status: DISCONTINUED | OUTPATIENT
Start: 2024-02-12 | End: 2024-02-12 | Stop reason: HOSPADM

## 2024-02-12 RX ORDER — METOPROLOL SUCCINATE 25 MG/1
25 TABLET, EXTENDED RELEASE ORAL DAILY
Qty: 90 TABLET | Refills: 1 | Status: SHIPPED | OUTPATIENT
Start: 2024-02-12 | End: 2024-02-12 | Stop reason: SDUPTHER

## 2024-02-12 RX ORDER — PANTOPRAZOLE SODIUM 20 MG/1
20 TABLET, DELAYED RELEASE ORAL DAILY
COMMUNITY

## 2024-02-12 RX ORDER — NITROGLYCERIN 0.4 MG/1
0.4 TABLET SUBLINGUAL
Status: ON HOLD
Start: 2024-02-12 | End: 2024-02-12

## 2024-02-12 RX ORDER — REPAGLINIDE 1 MG/1
1 TABLET ORAL DAILY
Status: DISCONTINUED | OUTPATIENT
Start: 2024-02-12 | End: 2024-02-12 | Stop reason: HOSPADM

## 2024-02-12 RX ORDER — SODIUM CHLORIDE 9 MG/ML
100 INJECTION, SOLUTION INTRAVENOUS CONTINUOUS
Status: DISCONTINUED | OUTPATIENT
Start: 2024-02-12 | End: 2024-02-12 | Stop reason: HOSPADM

## 2024-02-12 RX ORDER — SODIUM CHLORIDE 0.9 % (FLUSH) 0.9 %
10 SYRINGE (ML) INJECTION AS NEEDED
Status: DISCONTINUED | OUTPATIENT
Start: 2024-02-12 | End: 2024-02-12 | Stop reason: HOSPADM

## 2024-02-12 RX ORDER — DIPHENOXYLATE HYDROCHLORIDE AND ATROPINE SULFATE 2.5; .025 MG/1; MG/1
1 TABLET ORAL DAILY
Status: DISCONTINUED | OUTPATIENT
Start: 2024-02-12 | End: 2024-02-12 | Stop reason: HOSPADM

## 2024-02-12 RX ORDER — NAPROXEN 250 MG/1
250 TABLET ORAL 2 TIMES DAILY PRN
Status: DISCONTINUED | OUTPATIENT
Start: 2024-02-12 | End: 2024-02-12 | Stop reason: HOSPADM

## 2024-02-12 RX ORDER — ACETAMINOPHEN 325 MG/1
650 TABLET ORAL EVERY 4 HOURS PRN
Status: DISCONTINUED | OUTPATIENT
Start: 2024-02-12 | End: 2024-02-12 | Stop reason: HOSPADM

## 2024-02-12 RX ORDER — METOPROLOL SUCCINATE 25 MG/1
25 TABLET, EXTENDED RELEASE ORAL DAILY
Qty: 90 TABLET | Refills: 1 | Status: SHIPPED | OUTPATIENT
Start: 2024-02-12

## 2024-02-12 RX ORDER — CLOPIDOGREL BISULFATE 75 MG/1
75 TABLET ORAL DAILY
Status: CANCELLED | OUTPATIENT
Start: 2024-02-12

## 2024-02-12 RX ORDER — ASPIRIN 81 MG/1
81 TABLET ORAL DAILY
Start: 2024-02-13

## 2024-02-12 RX ORDER — NICARDIPINE HCL-0.9% SOD CHLOR 1 MG/10 ML
SYRINGE (ML) INTRAVENOUS
Status: DISCONTINUED | OUTPATIENT
Start: 2024-02-12 | End: 2024-02-12 | Stop reason: HOSPADM

## 2024-02-12 RX ORDER — FOLIC ACID 1 MG/1
1 TABLET ORAL DAILY
Status: DISCONTINUED | OUTPATIENT
Start: 2024-02-12 | End: 2024-02-12 | Stop reason: HOSPADM

## 2024-02-12 RX ORDER — ISOSORBIDE MONONITRATE 30 MG/1
30 TABLET, EXTENDED RELEASE ORAL DAILY
Qty: 90 TABLET | Refills: 1 | Status: SHIPPED | OUTPATIENT
Start: 2024-02-12 | End: 2024-02-12 | Stop reason: SDUPTHER

## 2024-02-12 RX ORDER — MECLIZINE HYDROCHLORIDE 25 MG/1
25 TABLET ORAL 3 TIMES DAILY PRN
Status: DISCONTINUED | OUTPATIENT
Start: 2024-02-12 | End: 2024-02-12 | Stop reason: HOSPADM

## 2024-02-12 RX ORDER — FENTANYL CITRATE 50 UG/ML
INJECTION, SOLUTION INTRAMUSCULAR; INTRAVENOUS
Status: DISCONTINUED | OUTPATIENT
Start: 2024-02-12 | End: 2024-02-12 | Stop reason: HOSPADM

## 2024-02-12 RX ORDER — HEPARIN SODIUM 1000 [USP'U]/ML
INJECTION, SOLUTION INTRAVENOUS; SUBCUTANEOUS
Status: DISCONTINUED | OUTPATIENT
Start: 2024-02-12 | End: 2024-02-12 | Stop reason: HOSPADM

## 2024-02-12 RX ORDER — ROSUVASTATIN CALCIUM 20 MG/1
20 TABLET, COATED ORAL DAILY
Status: DISCONTINUED | OUTPATIENT
Start: 2024-02-12 | End: 2024-02-12 | Stop reason: HOSPADM

## 2024-02-12 RX ADMIN — REPAGLINIDE 1 MG: 1 TABLET ORAL at 08:48

## 2024-02-12 RX ADMIN — CLOPIDOGREL BISULFATE 75 MG: 75 TABLET, FILM COATED ORAL at 08:48

## 2024-02-12 RX ADMIN — Medication 1 TABLET: at 08:48

## 2024-02-12 RX ADMIN — LINAGLIPTIN 5 MG: 5 TABLET, FILM COATED ORAL at 08:48

## 2024-02-12 RX ADMIN — EMPAGLIFLOZIN 25 MG: 10 TABLET, FILM COATED ORAL at 08:48

## 2024-02-12 RX ADMIN — Medication 500 MG: at 08:48

## 2024-02-12 RX ADMIN — PANTOPRAZOLE SODIUM 40 MG: 40 TABLET, DELAYED RELEASE ORAL at 08:48

## 2024-02-12 RX ADMIN — ROSUVASTATIN CALCIUM 20 MG: 20 TABLET, FILM COATED ORAL at 08:48

## 2024-02-12 RX ADMIN — Medication 1 MG: at 08:48

## 2024-02-12 RX ADMIN — Medication 10 ML: at 08:49

## 2024-02-12 RX ADMIN — ASPIRIN 81 MG: 81 TABLET, COATED ORAL at 08:48

## 2024-02-12 NOTE — CONSULTS
[facility]  CONSULT  NOTE      Patient information:  Date of Admit: 2/11/2024  Date of Consult: 02/12/24  Hospitalist/Referring MD:Jos Oquendo DO;   PCP: Pastora Kulkarni MD  MRN:  [id number]  Visit Number:  [id number]    LOS: 1  CODE STATUS:  Code Status and Medical Interventions:   Ordered at: 02/11/24 1817     Code Status (Patient has no pulse and is not breathing):    CPR (Attempt to Resuscitate)     Medical Interventions (Patient has pulse or is breathing):    Full Support       PROBLEM LIST: Principal Problem:    Chest pain      Inpatient Cardiology Consult  Consult performed by: Xochitl Leblanc APRN  Consult ordered by: Jos Oquendo DO        09:06 EST  2/12/2024    General Cardiology Consulting Physician: Dr. Mariangel Perez MD    Assessment    Angina type chest pain class III  ASCVD CAD s/p PCI of the LAD several times, last 2021, currently chest pain-free with negative troponin  Dyslipidemia  Essential hypertension  Diabetes mellitus type 2          Recommendations   1.  As the patient has typical chest pain consistent with angina required with cardiac catheterization, however patient is willing to go for [facility] with his primary cardiologist Dr. Garcia communicated with Dr. Ibarra and the patient will be transferred  2.  As the patient's chest pain-free right now continue with current medications including nitroglycerin and aspirin  3.  Continue with the statin  4.  His blood pressure has been a little bit labile will monitor for now pending cardiac catheterization        Reason for Cardiology consultation: Chest pain: Patient referred to [facility] but agreed to stay to discuss with cardiology    Subjective Data   ADMISSION INFORMATION:  Chief Complaint   Patient presents with    Chest Pain     History of Present Illness    Alfredito Marcus is a 74 y.o. male with a past medical history significant for CAD s/p PCI, HLD, HTN, GERD, gout,  "osteoarthritis, type II DM     Patient presented to [facility] ([facility] emergency room (ER) on 2/11/2024 with complaints of chest pain with associated neck tightness, B/L leg weakness, and nausea. He stated symptoms were similar to prior episodes of chest pain which were the result of a blockage. Patient stated he had similar symptoms 2 days prior to presentation and took a nitroglycerin tablet which relieved pain.  Patient stated he did \"a home stress test\" during which time he used a step machine and was able to reproduce cardiac symptoms. He reported his blood pressure was significantly elevated during the exercise which was abnormal for him. After rest, chest pain did improve. Patient denied any syncope, dizziness, diaphoresis, vomiting, or other acute symptoms.    Patient stated he is compliant with his home medications including DAPT and statin. Follows with Dr. Garcia in [facility] (primary cardiologist).      Cardiology has been consulted for further evaluation and management chest pain: Patient referred to [facility] but agreed to stay to discuss with cardiology.     Telemetry reveals SB 50s.    Patient was in room 302 A when he was seen and examined by Dr. Perez.  Patient is lying in bed resting quietly.  No acute distress noted at this time.  Patient currently denies any chest pain, shortness of breath, or palpitations. Patient does report the chest pain he had yesterday was same as when he previous had stents. Discussed with patient need for invasive coronary angiogram and he expressed desire to go to [facility] ([facility]) to have it done by his primary Cardiologist Dr. Garcia.     Dr. Perez  has spoke with Dr. Garcia and he has agreed to accept patient as a transfer.  Dr. Jones is aware.  Awaiting bed available at [facility].      Known medications given enroute vis EMS and in the ER:         Cardiac risk factors:arteriosclerotic heart disease, diabetes mellitus, hypercholesterolemia, and " hypertension    Holter monitor results:  05/18/2023          Last Echo: Results for orders placed during the hospital encounter of 05/31/22    Adult Transthoracic Echo Complete W/ Cont if Necessary Per Protocol    Interpretation Summary  · Normal left ventricular cavity size and wall thickness noted. All left ventricular wall segments contract normally  · Left ventricular ejection fraction appears to be 61 - 65%.  · Left ventricular diastolic function is consistent with (grade I) impaired relaxation.  · The aortic valve is structurally normal with no regurgitation or stenosis present.  · The mitral valve is structurally normal with no significant stenosis present. Trace mitral valve regurgitation is present.  · There is no evidence of pericardial effusion.         Last Stress: Results for orders placed during the hospital encounter of 06/12/23    Stress Test With Myocardial Perfusion One Day    Interpretation Summary    A stress test was performed following the Shan protocol.    Exercise duration (min) 3 min Estimated workload 4.6 METS    Baseline Vitals Baseline HR 84 bpm Baseline /70 mmHg Peak Stress Vitals Peak  bpm Peak /69 mmHg Recovery Vitals Recovery HR 70 bpm Recovery /67 mmHg Exercise Data Target HR (85%) 123 bpm Max. Pred. HR (100%) 145 bpm Percent Max Pred HR 84.83 %    Patient denied any chest discomfort during exercise,    There was no ST segment deviation noted during stress.    Arrhythmias during stress: occasional PVCs.    Findings consistent with a normal ECG stress test at a low level of exercise and at almost target heart rate.        Last Cath: Results for orders placed during the hospital encounter of 04/28/21    Cardiac Catheterization/Vascular Study    Narrative  FINAL    Impression  · 90% stenosis of mid LAD successfully stented with 2.5 x 18 mm LIZZETH and reduced to 0%.  · Patent stents of the proximal to mid LAD.  · Residual nonobstructive disease of the distal LAD,  the first marginal and the RCA.  · Normal left ventricular systolic function, estimated EF 60%.    RECOMMENDATIONS:  · Continue guideline-based optimal medical therapy and risk factor management.    Indications: Unstable angina/CAD.    Access: Left radial.    Procedures:  · Left heart catheterization.  · Left ventriculogram.  · Selective coronary angiography.  · PTCA/stenting of the mid LAD.  · Arterial site hemostasis with radial band.    Procedure narrative:  The patient was brought to the catheterization lab in a fasting condition.  Access site was prepped and draped in standard sterile fashion.  Lidocaine was injected and arterial access was obtained by percutaneous anterior wall puncture technique.  A 6 [place] arterial sheath was placed. Above procedures were performed without complications.  Following the angiograms the left coronary artery was engaged with CLS 3.5 guide catheter.  Additional heparin was given.  ACT was monitored.  The LAD had a 90% stenosis of the distal edge of the previous stents.  This was crossed with a luge wire and predilated with a 2.0 mm balloon and then stented with a 2.5 x 18 mm LIZZETH which was fully deployed and postdilated with excellent results and 0% residual stenosis and no evidence of complications.  Nitroglycerin was injected, final angiograms were taken and the guide catheter was removed.  HILDA flow was grade 3 before and after the intervention.  At the conclusion the arterial sheath was removed and hemostasis was achieved.  The patient was transferred to the unit in a stable condition.    Hemodynamic Findings:  Heart Rate: 86/minute.  LV pressure: 132/10-20 mmHg, on pull back no gradient was recorded across the aortic valve.    Angiographic Findings:  Right coronary dominance.  · LM: Angiographically normal.  · LAD: Previous stents from proximal to mid segment are patent with minimal IntraStent plaque.  At the distal edge of the stent there is a 90% stenosis which was stented  with a 2.5 x 18 mm LIZZETH.  Distal/apical LAD has 40% eccentric nonobstructive stenosis which was left for medical management.  · LCX: The larger first marginal has 40% nonobstructive plaque.  The second and third marginal are free from significant disease.  · RCA: 40% nonobstructive proximal and 40% nonobstructive mid segment plaque without occlusive disease.  · LV: Left ventriculogram performed in 30 MARCOS projection revealed normal global and regional left ventricular systolic function with estimated ejection fraction of 60%.  No mitral regurgitation was noted.    Complications: No acute procedure related complications.                            Past Medical History:   Diagnosis Date    Arthritis     CAD (coronary artery disease)     Closed fracture of proximal end of left humerus 09/03/2016    Diabetes     Dyslipidemia     GERD (gastroesophageal reflux disease)     Gout     H/O degenerative disc disease     Degenerative disc disease of the cervical and lumbar spine/chronic back pain    Heart disease     History of gluten intolerance     Hypertension     Myocardial infarction     Osteoarthritis      Past Surgical History:   Procedure Laterality Date    APPENDECTOMY      CARDIAC CATHETERIZATION      CARDIAC CATHETERIZATION Left 04/28/2021    Procedure: Left Heart Cath;  Surgeon: Grady Garcia MD;  Location: [facility] CATH INVASIVE LOCATION;  Service: Cardiology;  Laterality: Left;    CORONARY ANGIOPLASTY WITH STENT PLACEMENT      CYSTOSCOPY TRANSURETHRAL RESECTION OF PROSTATE      HEMORRHOIDECTOMY      HUMERUS SURGERY Left 2016    LUMBAR FUSION      REPLACEMENT TOTAL HIP LATERAL POSITION Right 2018     Family History   Problem Relation Age of Onset    Arthritis Mother     Hypertension Mother     Arthritis Father     Cancer Father     Diabetes Sister     Cancer Son      Social History     Tobacco Use    Smoking status: Never     Passive exposure: Past    Smokeless tobacco: Never   Vaping Use    Vaping Use: Never used    Substance Use Topics    Alcohol use: Not Currently    Drug use: Never     ALLERGIES: Shrimp (diagnostic) and Iodine    Medications listed below are reported home medications pulling from within the system:  Facility-Administered Medications Prior to Admission   Medication Dose Route Frequency Provider Last Rate Last Admin    bupivacaine (MARCAINE) 0.5 % injection 5 mL  5 mL Injection Once Marky López APRN         Medications Prior to Admission   Medication Sig Dispense Refill Last Dose    aspirin 325 MG tablet Take 1 tablet by mouth Daily.   2/11/2024    calcium carbonate (OS-HIRAM) 600 MG tablet Take 1 tablet by mouth Daily.   2/11/2024    clopidogrel (PLAVIX) 75 MG tablet Take 1 tablet by mouth Daily.   2/11/2024    coenzyme Q10 100 MG capsule Take 1 capsule by mouth Daily.   2/11/2024    dapagliflozin Propanediol (Farxiga) 10 MG tablet Take 10 mg by mouth Daily.   2/11/2024    folic acid (FOLVITE) 1 MG tablet Take 1 tablet by mouth Daily.   2/11/2024    JANUVIA 100 MG tablet Take 1 tablet by mouth Daily.   2/11/2024    linaclotide (LINZESS) 72 MCG capsule capsule Take 1 capsule by mouth Every Morning Before Breakfast.   2/11/2024    multivitamin tablet tablet Take 1 tablet by mouth Daily.   2/11/2024    olmesartan (BENICAR) 20 MG tablet Take 1 tablet by mouth Daily.   2/11/2024    Omega-3-Acid Eth Est, Dietary, 1 G capsule Take 1 capsule by mouth 4 (Four) Times a Day.   2/11/2024    pantoprazole (PROTONIX) 40 MG EC tablet Take 1 tablet by mouth Daily.   2/11/2024    repaglinide (PRANDIN) 1 MG tablet Take 1 tablet by mouth Daily.   2/11/2024    riFAXIMin (XIFAXAN) 550 MG tablet Take 1 tablet by mouth Every 8 (Eight) Hours.   2/11/2024 at AM    rosuvastatin (CRESTOR) 20 MG tablet Take 1 tablet by mouth Daily.   2/11/2024    meclizine (ANTIVERT) 25 MG tablet Take 1 tablet by mouth 3 (Three) Times a Day As Needed for Dizziness. 20 tablet 0 Unknown    naproxen sodium (ALEVE) 220 MG tablet Take 1 tablet by  mouth 2 (Two) Times a Day As Needed for Mild Pain.   Unknown    nitroglycerin (NITROSTAT) 0.4 MG SL tablet 1 under the tongue as needed for angina, may repeat q5mins for up three doses 100 tablet 1 Unknown       Review of Systems   Constitutional:  Negative for activity change, diaphoresis and unexpected weight change.   HENT:  Negative for facial swelling and sinus pressure.    Eyes:  Negative for visual disturbance.   Respiratory:  Positive for shortness of breath. Negative for apnea, cough, chest tightness, wheezing and stridor.    Cardiovascular:  Positive for chest pain. Negative for palpitations and leg swelling.   Gastrointestinal:  Negative for abdominal distention, nausea and vomiting.   Endocrine: Negative.    Genitourinary: Negative.    Musculoskeletal: Negative.    Skin: Negative.    Neurological:  Negative for dizziness, syncope, speech difficulty and weakness.   Psychiatric/Behavioral:  Negative for agitation and behavioral problems.      Objective Data   Vital Signs  Temp:  [97.7 °F (36.5 °C)-98 °F (36.7 °C)] 97.7 °F (36.5 °C)  Heart Rate:  [54-89] 54  Resp:  [14-16] 16  BP: (103-160)/(65-88) 103/65  Device (Oxygen Therapy): room air  Vital Signs (last 72 hrs)         02/09 0700  02/10 0659 02/10 0700 02/11 0659 02/11 0700 02/12 0659 02/12 0700 02/12 0906   Most Recent      Temp (°F)     97.7 -  98       97.7 (36.5) 02/12 0636    Heart Rate     54 -  89       54 02/12 0636    Resp     14 -  16       16 02/12 0636    BP     103/65 -  160/84       103/65 02/12 0636    SpO2 (%)     96 -  100       96 02/12 0636          BMI:   Body mass index is 22.67 kg/m².  WEIGHT:  Wt Readings from Last 3 Encounters:   02/12/24 71.7 kg (158 lb)   02/06/24 74.4 kg (164 lb)   11/13/23 73.5 kg (162 lb)     DIET:  NPO Diet NPO Type: Strict NPO  I&O:  Intake & Output (last 3 days)         02/09 0701  02/10 0700 02/10 0701  02/11 0700 02/11 0701  02/12 0700 02/12 0701  02/13 0700    Urine (mL/kg/hr)   200     Total  "Output   200     Net   -200                     Physical Exam  Constitutional:       General: He is not in acute distress.     Appearance: Normal appearance. He is not ill-appearing, toxic-appearing or diaphoretic.   HENT:      Head: Normocephalic and atraumatic.      Nose: Nose normal.      Mouth/Throat:      Mouth: Mucous membranes are moist.   Eyes:      Extraocular Movements: Extraocular movements intact.      Pupils: Pupils are equal, round, and reactive to light.   Neck:      Vascular: Carotid bruit present.      Comments: Bilateral   Cardiovascular:      Rate and Rhythm: Normal rate and regular rhythm.      Heart sounds: Normal heart sounds.   Pulmonary:      Effort: Pulmonary effort is normal.      Breath sounds: Normal breath sounds.   Abdominal:      General: Bowel sounds are normal.      Palpations: Abdomen is soft.   Musculoskeletal:         General: Normal range of motion.      Cervical back: Normal range of motion.   Skin:     General: Skin is warm and dry.   Neurological:      General: No focal deficit present.      Mental Status: He is alert and oriented to person, place, and time. Mental status is at baseline.   Psychiatric:         Mood and Affect: Mood normal.         Behavior: Behavior normal.         Thought Content: Thought content normal.         Judgment: Judgment normal.       Results review   Results Review:    I have reviewed the patient's new clinical results. 02/12/24 10:14 EST    Results from last 7 days   Lab Units 02/11/24  1725 02/11/24  1522   HSTROP T ng/L 16 17     No results found for: \"PROBNP\"  Results from last 7 days   Lab Units 02/11/24  1522   WBC 10*3/mm3 6.67   HEMOGLOBIN g/dL 16.1   PLATELETS 10*3/mm3 184     Results from last 7 days   Lab Units 02/11/24  1522   SODIUM mmol/L 139   POTASSIUM mmol/L 4.6   CHLORIDE mmol/L 101   CO2 mmol/L 27.2   BUN mg/dL 13   CREATININE mg/dL 1.08   CALCIUM mg/dL 9.4   GLUCOSE mg/dL 237*   ALT (SGPT) U/L 27   AST (SGOT) U/L 28     No " "results found for: \"MG\"  Lab Results   Component Value Date    TRIG 195 (H) 2016    HDL 24 (L) 2016    LDL 62 2016     Estimated Creatinine Clearance: 59 mL/min (by C-G formula based on SCr of 1.08 mg/dL).  Lab Results   Component Value Date    HGBA1C 6.4 (H) 2016     Lab Results   Component Value Date    INR 0.94 2024    INR 0.89 (L) 2023    INR 1.03 10/16/2022    INR 0.96 2016     No results found for: \"LABHEPA\"  No components found for: \"DIG\"  Lab Results   Component Value Date    TSH 2.239 2016      No results found for: \"URICACID\"  Pain Management Panel           No data to display              Microbiology Results (last 10 days)       ** No results found for the last 240 hours. **           No results found for: \"BLOODCX\"      EC2024        ECG/EMG Results (last 24 hours)       Procedure Component Value Units Date/Time    ECG 12 Lead ED Triage Standing Order; Chest Pain [[id number]] Collected: 24 1505     Updated: 24 1514     QT Interval 354 ms      QTC Interval 447 ms     Narrative:      Test Reason : ED Triage Standing Order~  Blood Pressure :   */*   mmHG  Vent. Rate :  96 BPM     Atrial Rate :  96 BPM     P-R Int : 150 ms          QRS Dur :  98 ms      QT Int : 354 ms       P-R-T Axes :  80  79  71 degrees     QTc Int : 447 ms    Normal sinus rhythm  Possible Left atrial enlargement  Borderline ECG  When compared with ECG of 2023 16:14,  Vent. rate has increased BY  33 BPM  Confirmed by Chris August () on 2024 3:14:19 PM    Referred By: MINESH           Confirmed By: Chris August          TELEMETRY:   SB 50s          RADIOLOGY STUDIES:  Imaging Results (Last 72 Hours)       Procedure Component Value Units Date/Time    XR Chest 1 View [[id number]] Collected: 24 1659     Updated: 24 1702    Narrative:      Procedure: Portable chest.     No Priors     Findings:     No consolidation  No pulmonary edema, pleural " effusion or pneumothorax       Impression:      Impression:  No acute process.        This report was finalized on 2/11/2024 5:00 PM by Evelyn Carpio MD.               ALLERGIES: Shrimp (diagnostic) and Iodine    CURRENT MEDICATIONS:  Current list of medications may not reflect those currently placed in orders that are not signed or are being held.     aspirin, 81 mg, Oral, Daily  calcium carbonate (oyster shell), 500 mg, Oral, Daily  clopidogrel, 75 mg, Oral, Daily  empagliflozin, 25 mg, Oral, Daily  enoxaparin, 40 mg, Subcutaneous, Nightly  folic acid, 1 mg, Oral, Daily  linaclotide, 72 mcg, Oral, QAM AC  linagliptin, 5 mg, Oral, Daily  losartan, 50 mg, Oral, Q24H  multivitamin, 1 tablet, Oral, Daily  pantoprazole, 40 mg, Oral, Daily  repaglinide, 1 mg, Oral, Daily  riFAXIMin, 550 mg, Oral, Q8H  rosuvastatin, 20 mg, Oral, Daily  sodium chloride, 10 mL, Intravenous, Q12H      Pharmacy to Dose enoxaparin (LOVENOX),         senna-docusate sodium **AND** polyethylene glycol **AND** bisacodyl **AND** bisacodyl    meclizine    naproxen    nitroglycerin    Pharmacy to Dose enoxaparin (LOVENOX)    sodium chloride    sodium chloride    sodium chloride      Thank you very much for asking us to be involved in this patient's care.  We will follow along with you. Please do not hesitate to call for any questions or concerns.     I have discussed the patients findings and recommendations with patient.    Electronically signed by ELSY La, 02/12/24, 10:50 AM EST.   Electronically signed by Mariangel Perez MD, 02/12/24, 12:13 PM EST.                        Please note that portions of this note were copied and has been reviewed and is accurate as of 2/12/2024 .      Please note that portions of this note were completed with a voice recognition program.

## 2024-02-12 NOTE — NURSING NOTE
Pt being transferred to [facility]. Report called to [facility], who states understanding of this report.

## 2024-02-12 NOTE — PLAN OF CARE
Goal Outcome Evaluation:   Pt transferred from ER to 3S this shift. Pt currently resting in bed. VSS with no complaints or S/S of distress. Will continue to follow plan of care.

## 2024-02-12 NOTE — PLAN OF CARE
Goal Outcome Evaluation:   Patient is being transferred to [facility].

## 2024-02-12 NOTE — H&P
[facility] HOSPITALIST HISTORY AND PHYSICAL    Patient Identification:  Name:  Alfredito Marcus  Age:  74 y.o.  Sex:  male  :  1950  MRN:  [id number]   Visit Number:  [id number]  Admit Date: 2024   Room number:  3302/1S  Primary Care Physician:  Pastora Kulkarni MD     Subjective     Chief complaint:    Chief Complaint   Patient presents with    Chest Pain       History of presenting illness:  74 y.o. male who presents to the hospital with complaints of 2 episodes of chest pain at home.  Patient with a past medical history significant for CAD status post prior stents x 4 to LAD, hypertension, hyperlipidemia, osteoarthritis, gout, history of cholecystectomy and appendectomy and total replacement of right hip.  Patient reports initial episode of chest pain which she describes as heaviness that occurred on Friday.  He took a nitroglycerin at the time and did not improve.  Today he decided to give himself an at home stress test by going up a steep set of stairs several times after walking around for 15 minutes which did result in chest discomfort which then resolved with rest.  Due to this he came to the hospital for further evaluation.  He denies any fevers or chills, cough, shortness of breath with any of this.  He also denies any diaphoresis he does report intermittent nausea but associates this more with recent abdominal discomfort that he has intermittently had and patient does have recent history of issues with constipation and is on Trulance.  Conversation was had in the emergency department with cardiology regarding patient's presentation and self-administered at home stress test with negative troponins and cardiology recommend patient would likely benefit from left heart cath given this history and patient's history as well.  Patient initially wishing desire to go to [place] for heart cath evaluation but no beds available and was agreeable to stay here in observation and  discussed with cardiology further evaluation and recommendations and at least obtain echocardiogram  ---------------------------------------------------------------------------------------------------------------------   Review of Systems a 14 system review of systems was performed with pertinent positives and negatives as above in HPI.  ---------------------------------------------------------------------------------------------------------------------   Past Medical History:   Diagnosis Date    Arthritis     CAD (coronary artery disease)     Closed fracture of proximal end of left humerus 09/03/2016    Diabetes     Dyslipidemia     GERD (gastroesophageal reflux disease)     Gout     H/O degenerative disc disease     Degenerative disc disease of the cervical and lumbar spine/chronic back pain    Heart disease     History of gluten intolerance     Hypertension     Myocardial infarction     Osteoarthritis      Past Surgical History:   Procedure Laterality Date    APPENDECTOMY      CARDIAC CATHETERIZATION      CARDIAC CATHETERIZATION Left 04/28/2021    Procedure: Left Heart Cath;  Surgeon: Grady Garcia MD;  Location:  [facility];  Service: Cardiology;  Laterality: Left;    CORONARY ANGIOPLASTY WITH STENT PLACEMENT      CYSTOSCOPY TRANSURETHRAL RESECTION OF PROSTATE      HEMORRHOIDECTOMY      HUMERUS SURGERY Left 2016    LUMBAR FUSION      REPLACEMENT TOTAL HIP LATERAL POSITION Right 2018     Family History   Problem Relation Age of Onset    Arthritis Mother     Hypertension Mother     Arthritis Father     Cancer Father     Diabetes Sister     Cancer Son      Social History     Socioeconomic History    Marital status:    Tobacco Use    Smoking status: Never     Passive exposure: Past    Smokeless tobacco: Never   Vaping Use    Vaping Use: Never used   Substance and Sexual Activity    Alcohol use: Not Currently    Drug use: Never    Sexual activity: Defer      ---------------------------------------------------------------------------------------------------------------------   Allergies:  Shrimp (diagnostic) and Iodine  ---------------------------------------------------------------------------------------------------------------------   Medications below are reported home medications pulling from within the system; at this time, these medications have not been reconciled unless otherwise specified and are in the verification process for further verifcation as current home medications.    Prior to Admission Medications       Prescriptions Last Dose Informant Patient Reported? Taking?    aspirin 81 MG EC tablet   Yes No    Take 1 tablet by mouth Daily.    bupivacaine (MARCAINE) 0.5 % injection 5 mL   No No    calcium carbonate (OS-HIRAM) 600 MG tablet   Yes No    Take 1 tablet by mouth 2 (Two) Times a Day With Meals.    cetirizine (zyrTEC) 10 MG tablet   Yes No    Take 1 tablet by mouth Daily.    Coenzyme Q10 (CoQ10) 100 MG capsule  Self Yes No    Take 1 capsule by mouth Daily.    docusate sodium (COLACE) 100 MG capsule  Self Yes No    Take 3 capsules by mouth As Needed.    fexofenadine (ALLEGRA) 180 MG tablet   Yes No    Take 1 tablet by mouth Daily.    folic acid (FOLVITE) 1 MG tablet   Yes No    Take 1 tablet by mouth Daily.    JANUVIA 100 MG tablet   Yes No    Take 1 tablet by mouth Daily.    latanoprost (XALATAN) 0.005 % ophthalmic solution  Self Yes No    Administer 1 drop to the right eye Every Night.    meclizine (ANTIVERT) 25 MG tablet   No No    Take 1 tablet by mouth 3 (Three) Times a Day As Needed for Dizziness.    multivitamin with minerals tablet tablet   Yes No    Take 1 tablet by mouth Daily.    nitroglycerin (NITROSTAT) 0.4 MG SL tablet   No No    1 under the tongue as needed for angina, may repeat q5mins for up three doses    olmesartan (Benicar) 40 MG tablet   Yes No    Take 1 tablet by mouth Daily.    Omega-3-Acid Eth Est, Dietary, 1 G capsule  Self  Yes No    Take 1 capsule by mouth 2 (Two) Times a Day.    pantoprazole (PROTONIX) 40 MG EC tablet   Yes No    Take 1 tablet by mouth Daily.    repaglinide (PRANDIN) 1 MG tablet   Yes No    Take 1 tablet by mouth 3 (Three) Times a Day Before Meals.    rosuvastatin (CRESTOR) 20 MG tablet   Yes No    Take 1 tablet by mouth Daily.    traMADol (ULTRAM) 50 MG tablet   Yes No    Take 1 tablet by mouth As Needed for Moderate Pain.    traMADol HCl, ER Biphasic, (RYZOLT) 200 MG tablet sustained-release 24 hour   Yes No    Take 100 mg by mouth Daily.          Objective     Vital Signs:  Temp:  [97.8 °F (36.6 °C)] 97.8 °F (36.6 °C)  Heart Rate:  [56-89] 58  Resp:  [14-16] 16  BP: (120-160)/(70-88) 136/74    Mean Arterial Pressure (Non-Invasive) for the past 24 hrs (Last 3 readings):   Noninvasive MAP (mmHg)   02/11/24 1949 91   02/11/24 1845 112   02/11/24 1830 104     SpO2:  [97 %-100 %] 97 %  on   ;   Device (Oxygen Therapy): room air  Body mass index is 22.67 kg/m².    Wt Readings from Last 3 Encounters:   02/11/24 71.7 kg (158 lb)   02/06/24 74.4 kg (164 lb)   11/13/23 73.5 kg (162 lb)      ---------------------------------------------------------------------------------------------------------------------   Physical Exam:  Constitutional:  Well-developed and well-nourished.  No respiratory distress.      HENT:  Head: Normocephalic and atraumatic.  Mouth:  Moist mucous membranes.    Eyes:  Conjunctivae and EOM are normal.  Pupils are equal, round, and reactive to light.  No scleral icterus.  Neck:  Neck supple.  No JVD present.    Cardiovascular: Bradycardic but regular rhythm and normal heart sounds with no murmur.  Pulmonary/Chest:  No respiratory distress, no wheezes, no crackles, with normal breath sounds and good air movement.  Abdominal:  Soft.  Bowel sounds are normal.  No distension and no tenderness.   Musculoskeletal:  No tenderness and no deformity.  No red or swollen joints anywhere.    Neurological:  Alert and  "oriented to person, place, and time.  No cranial nerve deficit.  No tongue deviation.  No facial droop.  No slurred speech.   Skin:  Skin is warm and dry.  No rash noted.  No pallor.   Peripheral vascular:  No edema and pulses on all 4 extremities.    ---------------------------------------------------------------------------------------------------------------------  EKG:        Last echocardiogram:  Results for orders placed during the hospital encounter of 05/31/22    Adult Transthoracic Echo Complete W/ Cont if Necessary Per Protocol    Interpretation Summary  · Normal left ventricular cavity size and wall thickness noted. All left ventricular wall segments contract normally  · Left ventricular ejection fraction appears to be 61 - 65%.  · Left ventricular diastolic function is consistent with (grade I) impaired relaxation.  · The aortic valve is structurally normal with no regurgitation or stenosis present.  · The mitral valve is structurally normal with no significant stenosis present. Trace mitral valve regurgitation is present.  · There is no evidence of pericardial effusion.    --------------------------------------------------------------------------------------------------------------------  Labs:  Results from last 7 days   Lab Units 02/11/24  1522   WBC 10*3/mm3 6.67   HEMOGLOBIN g/dL 16.1   HEMATOCRIT % 46.7   MCV fL 96.1   MCHC g/dL 34.5   PLATELETS 10*3/mm3 184   INR  0.94         Results from last 7 days   Lab Units 02/11/24  1522   SODIUM mmol/L 139   POTASSIUM mmol/L 4.6   CHLORIDE mmol/L 101   CO2 mmol/L 27.2   BUN mg/dL 13   CREATININE mg/dL 1.08   CALCIUM mg/dL 9.4   GLUCOSE mg/dL 237*   ALBUMIN g/dL 4.8   BILIRUBIN mg/dL 1.8*   ALK PHOS U/L 86   AST (SGOT) U/L 28   ALT (SGPT) U/L 27   Estimated Creatinine Clearance: 59 mL/min (by C-G formula based on SCr of 1.08 mg/dL).    No results found for: \"AMMONIA\"  Results from last 7 days   Lab Units 02/11/24  1725 02/11/24  1522   HSTROP T ng/L 16 17 " "        No results found for: \"HGBA1C\", \"POCGLU\"  Lab Results   Component Value Date    TSH 2.239 08/16/2016     No results found for: \"PREGTESTUR\", \"PREGSERUM\", \"HCG\", \"HCGQUANT\"  Pain Management Panel           No data to display              Brief Urine Lab Results       None          No results found for: \"BLOODCX\"  No results found for: \"URINECX\"  No results found for: \"WOUNDCX\"  No results found for: \"STOOLCX\"    I have personally looked at the labs and they are summarized above.  ----------------------------------------------------------------------------------------------------------------------  Detailed radiology reports for the last 24 hours:    Imaging Results (Last 24 Hours)       Procedure Component Value Units Date/Time    XR Chest 1 View [[id number]] Collected: 02/11/24 1659     Updated: 02/11/24 1702    Narrative:      Procedure: Portable chest.     No Priors     Findings:     No consolidation  No pulmonary edema, pleural effusion or pneumothorax       Impression:      Impression:  No acute process.        This report was finalized on 2/11/2024 5:00 PM by Evelyn Carpio MD.             Final impressions for the last 30 days of radiology reports:    XR Chest 1 View    Result Date: 2/11/2024  Impression: No acute process.   This report was finalized on 2/11/2024 5:00 PM by Evelyn Carpio MD.     I have personally looked at the radiology images and read the final radiology report.    Assessment & Plan       Chest pain  Hx CAD status post stent x 4 to LAD    -Patient presenting with typical features of chest pain with sensation of heaviness worsened by exertion and patient with self-imposed stress test at home going up and down stairs with reinitiation of chest discomfort and heaviness.  Patient reports the symptoms are similar to symptomatology prior to previous stenting.    -Troponins negative x 2 and EKG without significant ischemic changes and no indication for heparin at this time.    " -Cardiology consulted and previously discussed with ER provider ELSY Rowland regarding recommendation of left heart cath however patient preferring to have this done in [place] but no beds available is agreeable to stay here for observation and discussion with cardiology further testing and is agreeable to echocardiogram    -Transthoracic echocardiogram ordered and pending at this time    -Continue home CAD regiment once reconciled    Hypertension  Hyperlipidemia    -Continue home antihypertensives and statin    History of gout    -Resume any home medications as appropriate    Osteoarthritis  Hx of DDD    -Supportive care        VTE Prophylaxis:   Mechanical Order History:       None          Pharmalogical Order History:        Ordered     Dose Route Frequency Stop    02/11/24 1942  Enoxaparin Sodium (LOVENOX) syringe 40 mg         40 mg SC Nightly --    02/11/24 1938  Pharmacy to Dose enoxaparin (LOVENOX)        Question:  Indication of use  Answer:  Prophylaxis    -- XX Continuous PRN --                    The patient is high risk due to the following diagnoses/reasons: Chest pain with typical features requiring further observation evaluation with echocardiogram and discussion with cardiology regarding further ischemic evaluation.        Jos Oquendo DO  [facility] Hospitalist  02/11/24  20:01 EST

## 2024-02-12 NOTE — DISCHARGE SUMMARY
"    [facility] HOSPITALIST MEDICINE DISCHARGE SUMMARY    Patient Identification:  Name:  Alfredito Marcus  Age:  74 y.o.  Sex:  male  :  1950  MRN:  [id number]  Visit Number:  [id number]    Date of Admission: 2024  Date of Discharge: 2024  DISCHARGE DISPOSITION   Stable  PCP: Pastora Kulkarni MD    DISCHARGE DIAGNOSIS : Chest pain concerning for accelerated angina MI was ruled out by cardiac enzymes, history of coronary disease status post stenting of LAD x 4 the last 1 was 2021, nonobstructive lesions RCA and LCx.  History of chronic constipation, history of essential hypertension, history of gout, history of osteoarthritis.    HOSPITAL COURSE  Patient is a 74 y.o. male presented to [facility] complaining of chest heaviness after ambulation relieved with rest, reported similar discomfort and symptoms prior to stenting previously.  He \"stressed himself \"by climbing up stairs which symptoms reoccurred described as heavy, relieved with rest, no lightheadedness no diaphoresis.  He was subsequently admitted, cardiology was able to evaluate and recommended cardiac cath, patient requested if possible to be transferred to [facility] where his cardiologist Dr. Garcia is available.  Dr. Perez was able to discuss the case with Dr. Vallejo and agreed to transfer the patient to [facility].  During his stay there was no recurrence of chest pain.    VITAL SIGNS:      24  1509 24  1951 24  0500   Weight: 74.4 kg (164 lb) 71.7 kg (158 lb) 71.7 kg (158 lb) (Admit weight less than 24 hrs.)     Body mass index is 22.67 kg/m².  Vitals:    24 0636   BP: 103/65   Pulse: 54   Resp: 16   Temp: 97.7 °F (36.5 °C)   SpO2: 96%     PHYSICAL EXAM:  General: Comfortable,awake, alert, oriented to self, place, and time, well-developed and well-nourished.  No respiratory distress.    Skin:  Skin is warm and dry. No rash noted. No pallor.    HENT:  Head:  " Normocephalic and atraumatic.  Mouth:  Moist mucous membranes.    Eyes:  Conjunctivae and EOM are normal.  Pupils are equal, round, and reactive to light.  No scleral icterus.    Neck:  Neck supple.  No JVD present.  No bruit  Pulmonary/Chest: No rash, equal chest expansion no respiratory distress, no wheezes, no crackles, with normal breath sounds and good air movement.  Cardiovascular:  Normal rate, regular rhythm and normal heart sounds with no murmur.  Abdominal:  Soft.  Bowel sounds are normal.  No distension and no tenderness.   Extremities:  No edema, no tenderness, and no deformity.  No red or swollen joints anywhere.  Strong pulses in all 4 extremities with no clubbing, no cyanosis, no edema.  Neurological:  Motor strength equal no obvious deficit, sensory grossly intact.   No cranial nerve deficit.  No tongue deviation.  No facial droop.  No slurred speech.    Genitourinary: No Blanco catheter  Back:  ----------    DISCHARGE MEDICATIONS:     Discharge Medications        New Medications        Instructions Start Date   aspirin 81 MG EC tablet  Replaces: aspirin 325 MG tablet   81 mg, Oral, Daily   Start Date: February 13, 2024     Enoxaparin Sodium 40 MG/0.4ML solution prefilled syringe syringe  Commonly known as: LOVENOX   40 mg, Subcutaneous, Nightly             Changes to Medications        Instructions Start Date   meclizine 25 MG tablet  Commonly known as: ANTIVERT  What changed: Another medication with the same name was added. Make sure you understand how and when to take each.   25 mg, Oral, 3 Times Daily PRN      meclizine 25 MG tablet  Commonly known as: ANTIVERT  What changed: You were already taking a medication with the same name, and this prescription was added. Make sure you understand how and when to take each.   25 mg, Oral, 3 Times Daily PRN      nitroglycerin 0.4 MG SL tablet  Commonly known as: NITROSTAT  What changed: Another medication with the same name was added. Make sure you  understand how and when to take each.   1 under the tongue as needed for angina, may repeat q5mins for up three doses      nitroglycerin 0.4 MG SL tablet  Commonly known as: NITROSTAT  What changed: You were already taking a medication with the same name, and this prescription was added. Make sure you understand how and when to take each.   0.4 mg, Sublingual, Every 5 Minutes PRN, Take no more than 3 doses in 15 minutes.             Continue These Medications        Instructions Start Date   calcium carbonate 600 MG tablet  Commonly known as: OS-HIRAM   600 mg, Oral, Daily      clopidogrel 75 MG tablet  Commonly known as: PLAVIX   75 mg, Oral, Daily      Farxiga 10 MG tablet  Generic drug: dapagliflozin Propanediol   10 mg, Oral, Daily      folic acid 1 MG tablet  Commonly known as: FOLVITE   1 mg, Oral, Daily      Januvia 100 MG tablet  Generic drug: SITagliptin   100 mg, Oral, Daily      linaclotide 72 MCG capsule capsule  Commonly known as: LINZESS   72 mcg, Oral, Every Morning Before Breakfast      multivitamin tablet tablet   1 tablet, Oral, Daily      naproxen sodium 220 MG tablet  Commonly known as: ALEVE   220 mg, Oral, 2 Times Daily PRN      olmesartan 20 MG tablet  Commonly known as: BENICAR   20 mg, Oral, Daily      Omega-3-Acid Eth Est (Dietary) 1 g capsule   1 capsule, Oral, 4 Times Daily      pantoprazole 40 MG EC tablet  Commonly known as: PROTONIX   40 mg, Oral, Daily      repaglinide 1 MG tablet  Commonly known as: PRANDIN   1 mg, Oral, Daily      riFAXIMin 550 MG tablet  Commonly known as: XIFAXAN   550 mg, Oral, Every 8 Hours Scheduled      rosuvastatin 20 MG tablet  Commonly known as: CRESTOR   20 mg, Oral, Daily             Stop These Medications      aspirin 325 MG tablet  Replaced by: aspirin 81 MG EC tablet     coenzyme Q10 100 MG capsule                Your Scheduled Appointments      Feb 16, 2024 11:40 AM  New Problem with Milvia Edmonds MD  [facility] GENERAL SURGERY  ([facility]) [address]  [facility] [address]  [phone]        Mar 01, 2024  1:30 PM  Follow Up with Grady Garcia MD  [facility] CARDIOLOGY ([place]) [address]  [facility] [address]  [phone]   -Bring photo ID, insurance card, and list of medications to appointment  -If testing was completed outside of [facility] then patient must bring images on a disc  -Copay will be collected at time of appointment  -Established patients should arrive 30 minutes prior to appointment                Additional Instructions for the Follow-ups that You Need to Schedule       Discharge Follow-up with PCP   As directed       Currently Documented PCP:    Pastora Kulkarni MD    PCP Phone Number:    [phone]     Follow Up Details: Dr. Pastora Kulkarni MD (next week posthospitalization follow-up)               Follow-up Information       Pastora Kulkarni MD .    Specialty: Geriatric Medicine  Why: Dr. Pastora Kulkarni MD (next week posthospitalization follow-up)  Contact information:  [address]  [facility] [address]  [phone]                                  Lin Jones MD  02/12/24  11:33 EST    Please note that this discharge summary required more than 30 minutes to complete.

## 2024-02-13 ENCOUNTER — DOCUMENTATION (OUTPATIENT)
Dept: CARDIAC REHAB | Facility: HOSPITAL | Age: 74
End: 2024-02-13
Payer: MEDICARE

## 2024-02-14 LAB
QT INTERVAL: 398 MS
QTC INTERVAL: 444 MS

## 2024-02-15 ENCOUNTER — TELEPHONE (OUTPATIENT)
Dept: CARDIOLOGY | Facility: CLINIC | Age: 74
End: 2024-02-15
Payer: MEDICARE

## 2024-02-22 ENCOUNTER — OFFICE VISIT (OUTPATIENT)
Dept: SURGERY | Facility: CLINIC | Age: 74
End: 2024-02-22
Payer: MEDICARE

## 2024-02-22 VITALS
WEIGHT: 157 LBS | HEIGHT: 70 IN | BODY MASS INDEX: 22.48 KG/M2 | SYSTOLIC BLOOD PRESSURE: 110 MMHG | DIASTOLIC BLOOD PRESSURE: 60 MMHG

## 2024-02-22 DIAGNOSIS — R10.31 RLQ ABDOMINAL PAIN: Primary | ICD-10-CM

## 2024-02-24 PROBLEM — R10.31 RLQ ABDOMINAL PAIN: Status: ACTIVE | Noted: 2024-02-24

## 2024-02-24 NOTE — PROGRESS NOTES
Subjective   Alfredito Marcus is a 74 y.o. male is here today for follow-up.    History of Present Illness  Mr. Marcus was seen in the office today for follow-up of right lower quadrant pain.  He has had a workup including CT and colonoscopy which were negative.  His pain is at the prior incision site from his laparoscopic appendectomy.  The patient noted to be constipated and took medication to rectify this but this did not help his pain.  He states it is tender to palpation at the incision site.  He was offered an injection of Marcaine at the injection site to try to distinguish whether pain was abdominal wall or not.  He discontinued his Plavix 5 days prior to the procedure.  Allergies   Allergen Reactions    Shrimp (Diagnostic) Other (See Comments)     Lip swelling and hives    Iodine Unknown - Low Severity       Current Outpatient Medications   Medication Sig Dispense Refill    aspirin 81 MG EC tablet Take 1 tablet by mouth Daily.      calcium carbonate (OS-HIRAM) 600 MG tablet Take 1 tablet by mouth Daily.      dapagliflozin Propanediol (Farxiga) 10 MG tablet Take 10 mg by mouth Daily.      folic acid (FOLVITE) 1 MG tablet Take 1 tablet by mouth Daily.      isosorbide mononitrate (IMDUR) 30 MG 24 hr tablet Take 1 tablet by mouth Daily. 90 tablet 1    JANUVIA 100 MG tablet Take 1 tablet by mouth Daily.      meclizine (ANTIVERT) 25 MG tablet Take 1 tablet by mouth 3 (Three) Times a Day As Needed for Dizziness. 20 tablet 0    metoprolol succinate XL (TOPROL-XL) 25 MG 24 hr tablet Take 1 tablet by mouth Daily. 90 tablet 1    multivitamin tablet tablet Take 1 tablet by mouth Daily.      naproxen sodium (ALEVE) 220 MG tablet Take 1 tablet by mouth 2 (Two) Times a Day As Needed for Mild Pain.      nitroglycerin (NITROSTAT) 0.4 MG SL tablet 1 under the tongue as needed for angina, may repeat q5mins for up three doses 100 tablet 1    olmesartan (BENICAR) 20 MG tablet Take 1 tablet by mouth Daily.      Omega-3-Acid Eth Est,  "Dietary, 1 G capsule Take 1 capsule by mouth 4 (Four) Times a Day.      pantoprazole (PROTONIX) 20 MG EC tablet Take 1 tablet by mouth Daily.      repaglinide (PRANDIN) 1 MG tablet Take 1 tablet by mouth Daily.      riFAXIMin (XIFAXAN) 550 MG tablet Take 1 tablet by mouth Every 8 (Eight) Hours.      rosuvastatin (CRESTOR) 20 MG tablet Take 1 tablet by mouth Daily.      clopidogrel (PLAVIX) 75 MG tablet Take 1 tablet by mouth Daily. (Patient not taking: Reported on 2/22/2024)       Current Facility-Administered Medications   Medication Dose Route Frequency Provider Last Rate Last Admin    bupivacaine (MARCAINE) 0.5 % injection 5 mL  5 mL Injection Once Marky López, ELSY         Past Medical History:   Diagnosis Date    Arthritis     CAD (coronary artery disease)     Closed fracture of proximal end of left humerus 09/03/2016    Diabetes     Dyslipidemia     GERD (gastroesophageal reflux disease)     Gout     H/O degenerative disc disease     Degenerative disc disease of the cervical and lumbar spine/chronic back pain    Heart disease     History of gluten intolerance     Hypertension     Myocardial infarction     Osteoarthritis      Past Surgical History:   Procedure Laterality Date    APPENDECTOMY      CARDIAC CATHETERIZATION      CARDIAC CATHETERIZATION Left 04/28/2021    Procedure: Left Heart Cath;  Surgeon: Grady Garcia MD;  Location:  [facility];  Service: Cardiology;  Laterality: Left;    CARDIAC CATHETERIZATION N/A 2/12/2024    Procedure: Left Heart Cath;  Surgeon: Grady Garcia MD;  Location:  [facility];  Service: Cardiology;  Laterality: N/A;    CORONARY ANGIOPLASTY WITH STENT PLACEMENT      CYSTOSCOPY TRANSURETHRAL RESECTION OF PROSTATE      HEMORRHOIDECTOMY      HUMERUS SURGERY Left 2016    LUMBAR FUSION      REPLACEMENT TOTAL HIP LATERAL POSITION Right 2018       Pertinent Review of Systems    Objective   /60   Ht 177.8 cm (70\")   Wt 71.2 kg (157 lb)   " BMI 22.53 kg/m²    Physical Exam  Well-developed well-nourished male  Abdominal wall: Patient reports some mild tenderness to palpation at the right lower quadrant appendectomy site.  A palpable mass is not appreciated.    Procedures   Procedure Note    Procedure: Ultrasound-guided abdominal wall block with Marcaine    Location: Abdominal wall, right lower quadrant    Anesthesia: 20 cc half percent Marcaine with epi    Description:  The risks and benefits of the procedure were discussed.  After prep with Betadine and under ultrasound guidance 10 cc of half percent Marcaine with epinephrine were injected just anterior to the posterior rectus fascia.  Additional 10 cc were injected in the subcu and just deep to the anterior fascia.    Complications:  none    Follow-up: As needed  Results/Data:  Imaging: CT abdomen and pelvis reports and images were reviewed at patient request.  I agree with the assessment      Assessment & Plan   Right lower quadrant abdominal pain of uncertain etiology    Plan: Patient to restart Plavix tomorrow.  If he does get resolution of his symptoms over the next few hours he will let us know and we will arrange for a consultation with a pain clinic for more permanent nerve block.       Discussion/Summary:    Time spent:     BMI is within normal parameters. No other follow-up for BMI required.         Future Appointments   Date Time Provider Department Center   4/23/2024 10:45 AM Grady Garcia MD [facility]       Please note that portions of this note were completed with a voice recognition program.

## 2024-03-04 ENCOUNTER — TELEPHONE (OUTPATIENT)
Dept: CARDIOLOGY | Facility: CLINIC | Age: 74
End: 2024-03-04
Payer: MEDICARE

## 2024-03-04 NOTE — TELEPHONE ENCOUNTER
Patient called to report he has been experiencing frequent headaches and feeling very fatigued. He has since increased his Toprol back to the ordered dose of 25 mg daily. He states his BP and HR have been normal.     Advised patient to hold isosorbide for couple of days to see if symptoms are relieved.

## 2024-03-11 NOTE — TELEPHONE ENCOUNTER
Patient called back to report he is experiencing dizziness again. He states his BP is good but his HR is borderline low. Recommended patient decrease Toprol dose to 12.5 daily.     Patient to call back later this week with an update.

## 2024-04-12 RX ORDER — CLOPIDOGREL BISULFATE 75 MG/1
75 TABLET ORAL DAILY
Qty: 90 TABLET | Refills: 3 | Status: SHIPPED | OUTPATIENT
Start: 2024-04-12

## 2024-04-12 NOTE — TELEPHONE ENCOUNTER
Caller: Alfredito Marcus    Relationship: Self    Best call back number: [phone]    Requested Prescriptions:   Requested Prescriptions     Pending Prescriptions Disp Refills    clopidogrel (PLAVIX) 75 MG tablet 30 tablet      Sig: Take 1 tablet by mouth Daily.        Pharm[facility] where request should be sent: [id number] - [place] - [address] AT [facility] [address] - [phone]  - [phone] FX     Last office visit with prescribing clinician: 5/4/2023   Last telemedicine visit with prescribing clinician: Visit date not found   Next office visit with prescribing clinician: 4/23/2024     Additional details provided by patient:     Does the patient have less than a 3 day supply:  [] Yes  [x] No        Amy Espinoza Rep   04/12/24 12:18 EDT

## 2024-04-22 NOTE — PROGRESS NOTES
[facility] Card[facility]    Encounter Date: 2024    Patient ID: Alfredito Marcus is a 74 y.o. male.  : 1950     PCP: Pastora Kulkarni MD       Chief Complaint: Coronary Artery Disease (Coronary artery disease involving native coronary artery of native heart without angina pectoris)      PROBLEM LIST:  Coronary artery disease:  Unstable angina with abnormal Cardiolite stress test, 2008.  [facility], 2008, Dr. Garcia: 99% LAD stenosis s/p 2 overlapping Cypher LIZZETH with balloon angioplasty of D1. Rest of the vessels is free from significant disease and LV function was normal.  Cardiolite stress test, 2011: Mild apical ischemia. EF 64%.  [facility], 2011, Dr. Garcia: Patent stents of the LAD, no significant disease. Normal EF.  [facility], 2016, Dr. Edouard: Non-flow-limiting CAD. Widely patent LAD stents. Mild (20%-30%) disease in the LAD and RCA. Normal EF.  Echocardiogram, 2020: EF 56-60%. LVDF consistent with impaired relaxation. No significant valvular heart disease. No pericardial effusion.   [facility], 2021, Dr. Garcia: EF 65%. 90% stenosis of mid LAD stented with 2.5 x 18 mm LIZZETH and reduced to 0%. Patent stents of the proximal to mid LAD. Residual nonobstructive disease of the distal LAD, the first marginal and the RCA.   Echocardiogram, 2022; EF 61-65%. Grade I diastolic dysfunction. Trace MR.   Stress test 2023: normal stress test with no evidence of ischemia  [facility], 2024: EF 65%. Nonobstructive plaque disease involving multiple vessels without any evidence of hemodynamically significant coronary artery disease.   Bilateral carotid bruits  Carotid duplex, 2020: No hemodynamically single plaques or stenoses were demonstrated in carotid systems. Both vertebral artery flows are antegrade.  Carotid CT Angiogram, 10/16/2022; Minimal atherosclerotic disease at the carotid bifurcations. There is no carotid or vertebral arterial stenosis or  dissection in the neck. Negative intracranial CTA.  Palpitations  Event monitor, 06/15/2021; SR. Rare APCs. Occasional PVCs with couplets and triplets. Rare SVT up to 17 beats.   Holter 5/4/2023: rare PAC and PVC, one 13 beat run of SVT/PAT, no symptoms  AAA screening  Abdominal US 6/12/2023:Infrarenal abdominal aortic ectasia of 2.7 cm. No aneurysm.   Hypertension.  Dyslipidemia.  DM2  Osteoarthritis.  Gout.  History of gluten intolerance.  H/O degenerative disc disease.  Closed fracture of proximal end of left humerus.  Surgical history  Cholecystectomy  Total replacement of rt hip  Appendectomy    History of Present Illness  Patient presents today for a hospital follow-up with a history of coronary artery disease and cardiac risk factors. Since last visit, patient underwent LHC with Dr. Garcia 2/2024 and this revealed nonobstructive CAD. He has not had any recurrent chest pain since then. He is not very physically active right now but plans to start going outside more now that its warmer. Patient denies any chest pain, shortness of air, palpitations, orthopnea, edema, presyncope or syncope. He has been checking his BP at home and it is in the 120s/70s almost every time he checks it.     Allergies   Allergen Reactions    Shrimp (Diagnostic) Other (See Comments)     Lip swelling and hives    Shrimp Extract Unknown - Low Severity    Thimerosal Unknown - Low Severity    Iodine Unknown - Low Severity         Current Outpatient Medications:     aspirin 81 MG EC tablet, Take 1 tablet by mouth Daily., Disp: , Rfl:     calcium carbonate (OS-HIRAM) 600 MG tablet, Take 1 tablet by mouth Daily., Disp: , Rfl:     clopidogrel (PLAVIX) 75 MG tablet, Take 1 tablet by mouth Daily., Disp: 90 tablet, Rfl: 3    dapagliflozin Propanediol (Farxiga) 10 MG tablet, Take 10 mg by mouth Daily., Disp: , Rfl:     folic acid (FOLVITE) 1 MG tablet, Take 1 tablet by mouth Daily., Disp: , Rfl:     JANUVIA 100 MG tablet, Take 1 tablet by mouth  Daily., Disp: , Rfl:     meclizine (ANTIVERT) 25 MG tablet, Take 1 tablet by mouth 3 (Three) Times a Day As Needed for Dizziness., Disp: 20 tablet, Rfl: 0    metoprolol succinate XL (TOPROL-XL) 25 MG 24 hr tablet, Take 1 tablet by mouth Daily., Disp: 90 tablet, Rfl: 1    multivitamin tablet tablet, Take 1 tablet by mouth Daily., Disp: , Rfl:     naproxen sodium (ALEVE) 220 MG tablet, Take 1 tablet by mouth 2 (Two) Times a Day As Needed for Mild Pain., Disp: , Rfl:     nitroglycerin (NITROSTAT) 0.4 MG SL tablet, 1 under the tongue as needed for angina, may repeat q5mins for up three doses, Disp: 100 tablet, Rfl: 1    olmesartan (BENICAR) 20 MG tablet, Take 1 tablet by mouth Daily., Disp: , Rfl:     Omega-3-Acid Eth Est, Dietary, 1 G capsule, Take 1 capsule by mouth 4 (Four) Times a Day., Disp: , Rfl:     repaglinide (PRANDIN) 1 MG tablet, Take 1 tablet by mouth Daily., Disp: , Rfl:     rosuvastatin (CRESTOR) 20 MG tablet, Take 1 tablet by mouth Daily., Disp: , Rfl:     amLODIPine (Norvasc) 10 MG tablet, Take 1 tablet by mouth Daily., Disp: , Rfl:     carvedilol (COREG) 6.25 MG tablet, Take 1 tablet by mouth., Disp: , Rfl:     isosorbide mononitrate (IMDUR) 30 MG 24 hr tablet, Take 1 tablet by mouth Daily. (Patient not taking: Reported on 4/23/2024), Disp: 90 tablet, Rfl: 1    pantoprazole (PROTONIX) 20 MG EC tablet, Take 1 tablet by mouth Daily. (Patient not taking: Reported on 4/23/2024), Disp: , Rfl:     riFAXIMin (XIFAXAN) 550 MG tablet, Take 1 tablet by mouth Every 8 (Eight) Hours. (Patient not taking: Reported on 4/23/2024), Disp: , Rfl:     traMADol (ULTRAM) 50 MG tablet, Take 1 tablet by mouth Every 8 (Eight) Hours As Needed., Disp: , Rfl:     Current Facility-Administered Medications:     bupivacaine (MARCAINE) 0.5 % injection 5 mL, 5 mL, Injection, Once, Marky López APRN    The following portions of the patient's history were reviewed and updated as appropriate: allergies, current medications, past  "family history, past medical history, past social history, past surgical history and problem list.          Objective:     /72 (BP Location: Left arm, Patient Position: Sitting, Cuff Size: Adult)   Pulse 65   Ht 177.8 cm (70\")   Wt 74.4 kg (164 lb)   SpO2 97%   BMI 23.53 kg/m²      Physical Exam  Constitutional: Patient appears well-developed and well-nourished.   HENT: HEENT exam unremarkable.   Neck: Neck supple. No JVD present.   Cardiovascular: Normal rate, regular rhythm and normal heart sounds. No murmur heard.   2+ symmetric pulses.   Pulmonary/Chest: Breath sounds normal. Does not exhibit tenderness.   Musculoskeletal: Does not exhibit edema.   Neurological: Neurological exam unremarkable.   Vitals reviewed.    Data Review:   Lab Results   Component Value Date    GLUCOSE 237 (H) 02/11/2024    BUN 13 02/11/2024    CREATININE 1.08 02/11/2024    EGFR 71.1 02/11/2024    BCR 12.0 02/11/2024     02/11/2024    K 4.6 02/11/2024    CO2 27.2 02/11/2024    CALCIUM 9.4 02/11/2024    ALBUMIN 4.8 02/11/2024    AST 28 02/11/2024    ALT 27 02/11/2024     Lab Results   Component Value Date    CHOL 101 02/12/2024    TRIG 162 (H) 02/12/2024    HDL 35 (L) 02/12/2024    LDL 39 02/12/2024      Lab Results   Component Value Date    WBC 6.67 02/11/2024    RBC 4.86 02/11/2024    HGB 16.1 02/11/2024    HCT 46.7 02/11/2024    MCV 96.1 02/11/2024     02/11/2024      Procedures            Assessment and plan:      Diagnosis Plan   1. Coronary artery disease involving native coronary artery of native heart without angina pectoris  [facility], 02/12/2024: EF 65%. Nonobstructive plaque disease involving multiple vessels without any evidence of hemodynamically significant coronary artery disease.     No current angina.  Continue aspirin and Crestor.  Can discontinue Plavix as he has been having hemorrhaging in his eye and has had no recent stent placement.    Discussed symptoms consistent with progressive angina and he will " call our office if he develops any symptoms.      2. Essential hypertension  Blood pressure well-controlled in the office and at home.  Continue current antihypertensive regimen.      3. Dyslipidemia  Continue statin therapy.  LDL is at target on labs reviewed from 2/2024.        Continue current medications.   FU in 6 MO, sooner as needed.  Thank you for allowing us to participate in the care of your patient.       Rachelle Mayo PA-C      Please note that portions of this note may have been completed with a voice recognition program. Efforts were made to edit the dictations, but occasionally words are mistranscribed.

## 2024-04-23 ENCOUNTER — OFFICE VISIT (OUTPATIENT)
Dept: CARDIOLOGY | Facility: CLINIC | Age: 74
End: 2024-04-23
Payer: MEDICARE

## 2024-04-23 VITALS
BODY MASS INDEX: 23.48 KG/M2 | OXYGEN SATURATION: 97 % | WEIGHT: 164 LBS | SYSTOLIC BLOOD PRESSURE: 109 MMHG | DIASTOLIC BLOOD PRESSURE: 72 MMHG | HEART RATE: 65 BPM | HEIGHT: 70 IN

## 2024-04-23 DIAGNOSIS — I25.10 CORONARY ARTERY DISEASE INVOLVING NATIVE CORONARY ARTERY OF NATIVE HEART WITHOUT ANGINA PECTORIS: Primary | ICD-10-CM

## 2024-04-23 DIAGNOSIS — E78.5 DYSLIPIDEMIA: ICD-10-CM

## 2024-04-23 DIAGNOSIS — I10 ESSENTIAL HYPERTENSION: ICD-10-CM

## 2024-04-23 RX ORDER — TRAMADOL HYDROCHLORIDE 50 MG/1
50 TABLET ORAL EVERY 8 HOURS PRN
COMMUNITY

## 2024-04-23 RX ORDER — AMLODIPINE BESYLATE 10 MG/1
1 TABLET ORAL DAILY
COMMUNITY
End: 2024-04-23

## 2024-04-23 RX ORDER — CARVEDILOL 6.25 MG/1
6.25 TABLET ORAL
COMMUNITY
End: 2024-04-23

## 2024-04-24 ENCOUNTER — DOCUMENTATION (OUTPATIENT)
Dept: CARDIAC REHAB | Facility: HOSPITAL | Age: 74
End: 2024-04-24
Payer: MEDICARE

## 2024-04-24 NOTE — PROGRESS NOTES
Cardiac Rehab referral received on patient. After reviewing patient information there is no qualifying diagnosis noted at this time .Qualifications for Cardiac Rehab include Coronary Stenting, AMI (NSTEMI Type 1, STEMI), Stable Angina, CABG, Heart Valve Repair/Replacement, Heart Transplant or Stable Chronic Heart Failure (with these specific qualifications, Left Ventricular Ejection Fraction of 35% or less, NYHA class II to IV symptoms despite optimal heart failure therapy for at least 6 weeks and has not had a recent (less than or equal to 6 weeks) or planned (less than or equal to 6 months) major cardiovascular hospitalizations or procedures. Due to patient being in the hospital, does not meet criteria at this time) Please contact us at [phone] with questions.    If the diagnosis is CHF when the patient meets the CHF criteria for Cardiac Rehab with a new order we will gladly schedule them.

## 2024-05-06 ENCOUNTER — TELEPHONE (OUTPATIENT)
Dept: CARDIOLOGY | Facility: HOSPITAL | Age: 74
End: 2024-05-06
Payer: MEDICARE

## 2024-05-07 ENCOUNTER — TRANSCRIBE ORDERS (OUTPATIENT)
Dept: ADMINISTRATIVE | Facility: HOSPITAL | Age: 74
End: 2024-05-07
Payer: MEDICARE

## 2024-05-07 DIAGNOSIS — H81.09 MENIERE'S DISEASE, UNSPECIFIED LATERALITY: Primary | ICD-10-CM

## 2024-05-26 ENCOUNTER — HOSPITAL ENCOUNTER (OUTPATIENT)
Dept: MRI IMAGING | Facility: HOSPITAL | Age: 74
Discharge: HOME | End: 2024-05-26
Admitting: INTERNAL MEDICINE
Payer: MEDICARE

## 2024-05-26 DIAGNOSIS — H81.09 MENIERE'S DISEASE, UNSPECIFIED LATERALITY: ICD-10-CM

## 2024-05-26 PROCEDURE — 70551 MRI BRAIN STEM W/O DYE: CPT

## 2024-05-26 PROCEDURE — 70551 MRI BRAIN STEM W/O DYE: CPT | Performed by: RADIOLOGY

## 2024-06-05 ENCOUNTER — TRANSCRIBE ORDERS (OUTPATIENT)
Dept: ADMINISTRATIVE | Facility: HOSPITAL | Age: 74
End: 2024-06-05
Payer: MEDICARE

## 2024-06-05 DIAGNOSIS — H81.03 MENIERE'S DISEASE OF BOTH EARS: Primary | ICD-10-CM

## 2024-06-13 NOTE — ED NOTES
Called [facility] to give report, [facility] states that nurses are beginning report and cannot take report from ER at this time. Lead RN aware   [Good] : ~his/her~  mood as  good [Yes] : Yes [Monthly or less (1 pt)] : Monthly or less (1 point) [1 or 2 (0 pts)] : 1 or 2 (0 points) [Never (0 pts)] : Never (0 points) [No] : In the past 12 months have you used drugs other than those required for medical reasons? No [0] : 2) Feeling down, depressed, or hopeless: Not at all (0) [PHQ-2 Negative - No further assessment needed] : PHQ-2 Negative - No further assessment needed [Former] : Former [< 15 Years] : < 15 Years [Patient reported mammogram was normal] : Patient reported mammogram was normal [None] : None [With Family] : lives with family [Employed] : employed [Single] : single [# Of Children ___] : has [unfilled] children [Feels Safe at Home] : Feels safe at home [Patient reported colonoscopy was normal] : Patient reported colonoscopy was normal [Audit-CScore] : 1 [XUN2Ibvtv] : 0 [Change in mental status noted] : No change in mental status noted [High Risk Behavior] : no high risk behavior [MammogramDate] : 06/24 [ColonoscopyDate] : 6/23 [ColonoscopyComments] : pt will check with GI on when to FU  [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

## 2024-06-14 ENCOUNTER — HOSPITAL ENCOUNTER (OUTPATIENT)
Dept: CT IMAGING | Facility: HOSPITAL | Age: 74
Discharge: HOME | End: 2024-06-14
Payer: MEDICARE

## 2024-06-14 ENCOUNTER — TELEPHONE (OUTPATIENT)
Dept: CARDIOLOGY | Facility: CLINIC | Age: 74
End: 2024-06-14
Payer: MEDICARE

## 2024-06-14 DIAGNOSIS — H81.03 MENIERE'S DISEASE OF BOTH EARS: ICD-10-CM

## 2024-06-14 PROCEDURE — 70480 CT ORBIT/EAR/FOSSA W/O DYE: CPT

## 2024-06-14 NOTE — TELEPHONE ENCOUNTER
REQUEST FOR CARDIAC CLEARANCE    Caller name: Alfredito Marcus     Phone Number: [phone]    Surgeon's name: DR FERNANDEZ    Type of planned surgery: BACK INJECTIONS    Date of planned surgery: WAITING ON CLEARANCE    Type of anesthesia: LOCAL    Have you been experiencing chest pain or shortness of breath? NO    Is your doctor requesting for you to stop any of your medications prior to your surgery? PLAVIX    Where should we fax the clearance to? [phone] ATTN: DR FERNANDEZ

## 2024-06-17 NOTE — TELEPHONE ENCOUNTER
Cardiac clearance letter faxed to [phone] at this time. Spoke with patient about recommendations per Rachelle PEMBERTON, may hold Plavix for 7 days but must continue ASA. Patient verbalizes understanding at this time.

## 2024-06-17 NOTE — TELEPHONE ENCOUNTER
Fax received from [facility] for cardiac clearance for epidural steroid injection. Office is requesting patient to hold Plavix 7 days prior and hold ASA 6 days prior to procedure. Please advise.

## 2024-06-17 NOTE — TELEPHONE ENCOUNTER
Okay to proceed with procedure from a cardiac standpoint.  Can hold Plavix 7 days prior.  Must continue aspirin.

## 2024-07-31 ENCOUNTER — TRANSCRIBE ORDERS (OUTPATIENT)
Dept: ADMINISTRATIVE | Facility: HOSPITAL | Age: 74
End: 2024-07-31
Payer: MEDICARE

## 2024-07-31 DIAGNOSIS — I73.9 PERIPHERAL VASCULAR DISEASE, UNSPECIFIED: Primary | ICD-10-CM

## 2024-08-09 ENCOUNTER — HOSPITAL ENCOUNTER (OUTPATIENT)
Dept: ULTRASOUND IMAGING | Facility: HOSPITAL | Age: 74
Discharge: HOME | End: 2024-08-09
Payer: MEDICARE

## 2024-08-09 DIAGNOSIS — I73.9 PERIPHERAL VASCULAR DISEASE, UNSPECIFIED: ICD-10-CM

## 2024-08-09 PROCEDURE — 93922 UPR/L XTREMITY ART 2 LEVELS: CPT

## 2024-08-22 ENCOUNTER — HOSPITAL ENCOUNTER (OUTPATIENT)
Dept: ULTRASOUND IMAGING | Facility: HOSPITAL | Age: 74
Discharge: HOME | End: 2024-08-22
Admitting: INTERNAL MEDICINE
Payer: MEDICARE

## 2024-08-22 DIAGNOSIS — I73.9 PERIPHERAL VASCULAR DISEASE, UNSPECIFIED: ICD-10-CM

## 2024-08-22 PROCEDURE — 93925 LOWER EXTREMITY STUDY: CPT

## 2024-09-16 ENCOUNTER — TRANSCRIBE ORDERS (OUTPATIENT)
Dept: ADMINISTRATIVE | Facility: HOSPITAL | Age: 74
End: 2024-09-16
Payer: MEDICARE

## 2024-09-16 DIAGNOSIS — R10.84 GENERALIZED ABDOMINAL PAIN: Primary | ICD-10-CM

## 2024-09-24 ENCOUNTER — TRANSCRIBE ORDERS (OUTPATIENT)
Dept: ADMINISTRATIVE | Facility: HOSPITAL | Age: 74
End: 2024-09-24
Payer: MEDICARE

## 2024-09-24 ENCOUNTER — HOSPITAL ENCOUNTER (OUTPATIENT)
Dept: GENERAL RADIOLOGY | Facility: HOSPITAL | Age: 74
Discharge: HOME | End: 2024-09-24
Payer: MEDICARE

## 2024-09-24 DIAGNOSIS — M25.552 PAIN OF BOTH HIP JOINTS: ICD-10-CM

## 2024-09-24 DIAGNOSIS — M25.551 PAIN OF BOTH HIP JOINTS: ICD-10-CM

## 2024-09-24 DIAGNOSIS — M54.2 CERVICALGIA: Primary | ICD-10-CM

## 2024-09-24 DIAGNOSIS — M54.2 CERVICALGIA: ICD-10-CM

## 2024-09-24 DIAGNOSIS — M25.511 RIGHT SHOULDER PAIN, UNSPECIFIED CHRONICITY: ICD-10-CM

## 2024-09-24 PROCEDURE — 72050 X-RAY EXAM NECK SPINE 4/5VWS: CPT | Performed by: RADIOLOGY

## 2024-09-24 PROCEDURE — 72050 X-RAY EXAM NECK SPINE 4/5VWS: CPT

## 2024-09-24 PROCEDURE — 73522 X-RAY EXAM HIPS BI 3-4 VIEWS: CPT | Performed by: RADIOLOGY

## 2024-09-24 PROCEDURE — 73522 X-RAY EXAM HIPS BI 3-4 VIEWS: CPT

## 2024-09-24 PROCEDURE — 73030 X-RAY EXAM OF SHOULDER: CPT

## 2024-09-24 PROCEDURE — 73030 X-RAY EXAM OF SHOULDER: CPT | Performed by: RADIOLOGY

## 2024-10-07 ENCOUNTER — HOSPITAL ENCOUNTER (OUTPATIENT)
Dept: CT IMAGING | Facility: HOSPITAL | Age: 74
Discharge: HOME | End: 2024-10-07
Admitting: INTERNAL MEDICINE
Payer: MEDICARE

## 2024-10-07 DIAGNOSIS — R10.84 GENERALIZED ABDOMINAL PAIN: ICD-10-CM

## 2024-10-07 PROCEDURE — 74176 CT ABD & PELVIS W/O CONTRAST: CPT

## 2024-10-23 ENCOUNTER — TELEPHONE (OUTPATIENT)
Age: 74
End: 2024-10-23
Payer: MEDICARE

## 2024-10-23 NOTE — TELEPHONE ENCOUNTER
Caller: Alfredito Marcus    Relationship to patient: Self    Best call back number: [phone]    Patient is needing: PT REQUESTED AN APPOINTMENT REMINDER BE MAILED TO HIM AT THE ADDRESS ON FILE FOR HIS NEW PATIENT APPT WITH DR. RICKETTS ON 2/24/25 AT 3PM. THANK YOU.

## 2024-10-30 ENCOUNTER — OFFICE VISIT (OUTPATIENT)
Dept: ORTHOPEDIC SURGERY | Facility: CLINIC | Age: 74
End: 2024-10-30
Payer: MEDICARE

## 2024-10-30 VITALS
SYSTOLIC BLOOD PRESSURE: 93 MMHG | DIASTOLIC BLOOD PRESSURE: 57 MMHG | HEART RATE: 80 BPM | BODY MASS INDEX: 23.48 KG/M2 | WEIGHT: 164 LBS | HEIGHT: 70 IN

## 2024-10-30 DIAGNOSIS — M19.011 PRIMARY OSTEOARTHRITIS OF RIGHT SHOULDER: Primary | ICD-10-CM

## 2024-10-30 RX ORDER — INSULIN ASPART 100 [IU]/ML
INJECTION, SOLUTION INTRAVENOUS; SUBCUTANEOUS
COMMUNITY

## 2024-10-30 RX ORDER — EMPAGLIFLOZIN 25 MG/1
1 TABLET, FILM COATED ORAL EVERY MORNING
COMMUNITY

## 2024-10-30 RX ORDER — TIZANIDINE 2 MG/1
1 TABLET ORAL
COMMUNITY

## 2024-10-30 RX ORDER — HYDROCODONE BITARTRATE AND ACETAMINOPHEN 10; 325 MG/1; MG/1
TABLET ORAL
COMMUNITY

## 2024-10-30 RX ORDER — HYDROXYZINE HYDROCHLORIDE 50 MG/1
1 TABLET, FILM COATED ORAL DAILY
COMMUNITY
Start: 2024-10-16

## 2024-10-30 RX ORDER — PANTOPRAZOLE SODIUM 40 MG/1
TABLET, DELAYED RELEASE ORAL
COMMUNITY
Start: 2024-10-17

## 2024-10-30 RX ADMIN — METHYLPREDNISOLONE ACETATE 40 MG: 40 INJECTION, SUSPENSION INTRA-ARTICULAR; INTRALESIONAL; INTRAMUSCULAR; SOFT TISSUE at 13:08

## 2024-10-30 RX ADMIN — LIDOCAINE HYDROCHLORIDE 5 ML: 10 INJECTION, SOLUTION EPIDURAL; INFILTRATION; INTRACAUDAL; PERINEURAL at 13:08

## 2024-10-30 NOTE — PROGRESS NOTES
Established New Problem         Patient: Alfredito Marcus  YOB: 1950  Date of Encounter: 10/30/2024        Chief  Complaint:   Chief Complaint   Patient presents with    Right Shoulder - Pain     New-Problem             HPI:  Alfredito Marcus, 74 y.o. male presents for  evaluation of right shoulder pain he has had similar problems in the past received intra-articular steroid injections on several occasions with improvement.  He describes similar pain to his right shoulder with discomfort diffusely pain with activity and difficulty sleeping.  Reports no trauma denies weakness or numbness to his right arm.  He is currently receiving physical therapy.        Medical History:  Patient Active Problem List   Diagnosis    CAD (coronary artery disease)    Essential hypertension    Dyslipidemia    Osteoarthritis    Gout    History of gluten intolerance    H/O degenerative disc disease    Iron deficiency anemia, unspecified    GERD (gastroesophageal reflux disease)    Lymph node symptom    Angina pectoris    Chest pain    RLQ abdominal pain     Past Medical History:   Diagnosis Date    Arthritis     CAD (coronary artery disease)     Closed fracture of proximal end of left humerus 09/03/2016    Diabetes     Dyslipidemia     GERD (gastroesophageal reflux disease)     Gout     H/O degenerative disc disease     Degenerative disc disease of the cervical and lumbar spine/chronic back pain    Heart disease     History of gluten intolerance     Hypertension     Myocardial infarction     Osteoarthritis            Social History:  Social History     Socioeconomic History    Marital status:    Tobacco Use    Smoking status: Never     Passive exposure: Past    Smokeless tobacco: Never   Vaping Use    Vaping status: Never Used   Substance and Sexual Activity    Alcohol use: Not Currently    Drug use: Never    Sexual activity: Defer           Current Medications:    Current Outpatient Medications:     aspirin 81 MG EC  tablet, Take 1 tablet by mouth Daily., Disp: , Rfl:     calcium carbonate (OS-HIRAM) 600 MG tablet, Take 1 tablet by mouth Daily., Disp: , Rfl:     clopidogrel (PLAVIX) 75 MG tablet, Take 1 tablet by mouth Daily., Disp: 90 tablet, Rfl: 3    diclofenac (VOLTAREN) 50 MG EC tablet, , Disp: , Rfl:     folic acid (FOLVITE) 1 MG tablet, Take 1 tablet by mouth Daily., Disp: , Rfl:     HYDROcodone-acetaminophen (NORCO)  MG per tablet, TAKE 1/2 TABLET BY MOUTH THREE TIMES DAILY AS NEEDED FOR PAIN, Disp: , Rfl:     hydrOXYzine (ATARAX) 50 MG tablet, Take 1 tablet by mouth Daily., Disp: , Rfl:     JANUVIA 100 MG tablet, Take 1 tablet by mouth Daily., Disp: , Rfl:     Jardiance 25 MG tablet tablet, Take 1 tablet by mouth Every Morning., Disp: , Rfl:     meclizine (ANTIVERT) 25 MG tablet, Take 1 tablet by mouth 3 (Three) Times a Day As Needed for Dizziness., Disp: 20 tablet, Rfl: 0    multivitamin tablet tablet, Take 1 tablet by mouth Daily., Disp: , Rfl:     naproxen sodium (ALEVE) 220 MG tablet, Take 1 tablet by mouth 2 (Two) Times a Day As Needed for Mild Pain., Disp: , Rfl:     nitroglycerin (NITROSTAT) 0.4 MG SL tablet, 1 under the tongue as needed for angina, may repeat q5mins for up three doses, Disp: 100 tablet, Rfl: 1    NovoLOG FlexPen 100 UNIT/ML solution pen-injector sc pen, INJECT AS DIRECTED THREE TIMES DAILY PER SLIDING SCALE. MAX OF 30 UNITS PER DAY, Disp: , Rfl:     olmesartan (BENICAR) 20 MG tablet, Take 1 tablet by mouth Daily., Disp: , Rfl:     Omega-3-Acid Eth Est, Dietary, 1 G capsule, Take 1 capsule by mouth 4 (Four) Times a Day., Disp: , Rfl:     pantoprazole (PROTONIX) 40 MG EC tablet, TAKE 1 TABLET DAILY.       DISCONTINUE PREVACID, Disp: , Rfl:     repaglinide (PRANDIN) 1 MG tablet, Take 1 tablet by mouth Daily., Disp: , Rfl:     rosuvastatin (CRESTOR) 20 MG tablet, Take 1 tablet by mouth Daily., Disp: , Rfl:     tiZANidine (ZANAFLEX) 2 MG tablet, Take 1 tablet by mouth every night at bedtime.,  "Disp: , Rfl:     traMADol (ULTRAM) 50 MG tablet, Take 1 tablet by mouth Every 8 (Eight) Hours As Needed., Disp: , Rfl:     dapagliflozin Propanediol (Farxiga) 10 MG tablet, Take 10 mg by mouth Daily. (Patient not taking: Reported on 10/30/2024), Disp: , Rfl:     Current Facility-Administered Medications:     bupivacaine (MARCAINE) 0.5 % injection 5 mL, 5 mL, Injection, Once, Marky López, ELSY        Allergies:  Allergies   Allergen Reactions    Shrimp (Diagnostic) Other (See Comments)     Lip swelling and hives    Shrimp Extract Unknown - Low Severity    Thimerosal Unknown - Low Severity    Iodine Unknown - Low Severity           Family History:  Family History   Problem Relation Age of Onset    Arthritis Mother     Hypertension Mother     Arthritis Father     Cancer Father     Diabetes Sister     Cancer Son          Surgical History:  Past Surgical History:   Procedure Laterality Date    APPENDECTOMY      CARDIAC CATHETERIZATION      CARDIAC CATHETERIZATION Left 04/28/2021    Procedure: Left Heart Cath;  Surgeon: Grady Garcia MD;  Location:  [facility];  Service: Cardiology;  Laterality: Left;    CARDIAC CATHETERIZATION N/A 2/12/2024    Procedure: Left Heart Cath;  Surgeon: Grady Garcia MD;  Location:  [facility];  Service: Cardiology;  Laterality: N/A;    CORONARY ANGIOPLASTY WITH STENT PLACEMENT      CYSTOSCOPY TRANSURETHRAL RESECTION OF PROSTATE      HEMORRHOIDECTOMY      HUMERUS SURGERY Left 2016    LUMBAR FUSION      REPLACEMENT TOTAL HIP LATERAL POSITION Right 2018           Radiology:   Radiographs shoulder by my review demonstrate advanced osteoarthritis glenohumeral joint with large intra-articular osteophyte.    Vitals:  Vitals:    10/30/24 1504   BP: 93/57   Pulse: 80   Weight: 74.4 kg (164 lb)   Height: 177.8 cm (70\")      Body mass index is 23.53 kg/m².      Examination:   GENERAL: 74 y.o. male, alert and oriented X 3 in no acute distress.   Visit " "Vitals  BP 93/57   Pulse 80   Ht 177.8 cm (70\")   Wt 74.4 kg (164 lb)   BMI 23.53 kg/m²           Orthopedic Examination: Shoulder reveals limited flexion to 100 degrees with Jobes maneuver he has mild pain but good strength he demonstrates limited internal and external rotation with mild discomfort.  Acromioclavicular joint nontender bicipital groove nontender.          Assessment & Plan:   74 y.o. male presents with right shoulder pain known osteoarthritis by radiographs and history of similar problem left shoulder which responded to steroid injection today he is provided Depo-Medrol 40 mg lidocaine block intra-articular right shoulder he will return as needed.         Diagnosis Plan   1. Primary osteoarthritis of right shoulder  Large Joint Arthrocentesis            Large Joint Arthrocentesis: R glenohumeral  Date/Time: 10/30/2024 1:08 PM  Consent given by: patient  Site marked: site marked  Timeout: Immediately prior to procedure a time out was called to verify the correct patient, procedure, equipment, support staff and site/side marked as required   Supporting Documentation  Indications: pain   Procedure Details  Location: shoulder - R glenohumeral  Preparation: Patient was prepped and draped in the usual sterile fashion  Needle size: 25 G  Approach: anterior  Medications administered: 40 mg methylPREDNISolone acetate 40 MG/ML; 5 mL lidocaine PF 1% 1 %  Patient tolerance: patient tolerated the procedure well with no immediate complications              Cc:  Pastora Kulkarni MD          "

## 2024-11-01 RX ORDER — METHYLPREDNISOLONE ACETATE 40 MG/ML
40 INJECTION, SUSPENSION INTRA-ARTICULAR; INTRALESIONAL; INTRAMUSCULAR; SOFT TISSUE
Status: COMPLETED | OUTPATIENT
Start: 2024-10-30 | End: 2024-10-30

## 2024-11-01 RX ORDER — LIDOCAINE HYDROCHLORIDE 10 MG/ML
5 INJECTION, SOLUTION EPIDURAL; INFILTRATION; INTRACAUDAL; PERINEURAL
Status: COMPLETED | OUTPATIENT
Start: 2024-10-30 | End: 2024-10-30

## 2024-11-14 ENCOUNTER — TRANSCRIBE ORDERS (OUTPATIENT)
Dept: ADMINISTRATIVE | Facility: HOSPITAL | Age: 74
End: 2024-11-14
Payer: MEDICARE

## 2024-11-14 ENCOUNTER — HOSPITAL ENCOUNTER (OUTPATIENT)
Dept: GENERAL RADIOLOGY | Facility: HOSPITAL | Age: 74
Discharge: HOME | End: 2024-11-14
Admitting: INTERNAL MEDICINE
Payer: MEDICARE

## 2024-11-14 DIAGNOSIS — M25.449: ICD-10-CM

## 2024-11-14 DIAGNOSIS — M25.449: Primary | ICD-10-CM

## 2024-11-14 PROCEDURE — 73130 X-RAY EXAM OF HAND: CPT

## 2024-11-15 PROCEDURE — 73130 X-RAY EXAM OF HAND: CPT | Performed by: RADIOLOGY

## 2024-12-10 ENCOUNTER — OFFICE VISIT (OUTPATIENT)
Age: 74
End: 2024-12-10
Payer: MEDICARE

## 2024-12-10 VITALS — HEIGHT: 70 IN | BODY MASS INDEX: 22.42 KG/M2 | WEIGHT: 156.6 LBS

## 2024-12-10 DIAGNOSIS — R10.31 RLQ ABDOMINAL PAIN: Primary | ICD-10-CM

## 2024-12-10 RX ORDER — POLYETHYLENE GLYCOL 3350 17 G/17G
17 POWDER, FOR SOLUTION ORAL DAILY
Qty: 30 EACH | Refills: 0 | Status: SHIPPED | OUTPATIENT
Start: 2024-12-10

## 2024-12-10 RX ORDER — DOCUSATE SODIUM 250 MG
250 CAPSULE ORAL 2 TIMES DAILY PRN
Qty: 60 CAPSULE | Refills: 1 | Status: SHIPPED | OUTPATIENT
Start: 2024-12-10

## 2024-12-11 NOTE — PROGRESS NOTES
Subjective   Alfredito Marcus is a 74 y.o. male is being seen for consultation today at the request of Pastora Kulkarni MD    Alfredito Marcus is a 74 y.o. male with right lower quadrant abdominal pain.  On examination he has a previous open appendectomy scar.  He is on aspirin and Plavix.  He does have chronic constipation noted on imaging.  He is on no frequent regular bowel regimen.  No unintentional weight loss.  No change in bowel habits or stool caliber.    History of Present Illness      Past Medical History:   Diagnosis Date    Arthritis     CAD (coronary artery disease)     Closed fracture of proximal end of left humerus 09/03/2016    Diabetes     Dyslipidemia     GERD (gastroesophageal reflux disease)     Gout     H/O degenerative disc disease     Degenerative disc disease of the cervical and lumbar spine/chronic back pain    Heart disease     History of gluten intolerance     Hypertension     Myocardial infarction     Osteoarthritis        Family History   Problem Relation Age of Onset    Arthritis Mother     Hypertension Mother     Arthritis Father     Cancer Father     Diabetes Sister     Cancer Son        Social History     Socioeconomic History    Marital status:    Tobacco Use    Smoking status: Never     Passive exposure: Past    Smokeless tobacco: Never   Vaping Use    Vaping status: Never Used   Substance and Sexual Activity    Alcohol use: Not Currently    Drug use: Never    Sexual activity: Defer       Past Surgical History:   Procedure Laterality Date    APPENDECTOMY      CARDIAC CATHETERIZATION      CARDIAC CATHETERIZATION Left 04/28/2021    Procedure: Left Heart Cath;  Surgeon: Grady Garcia MD;  Location:  [facility];  Service: Cardiology;  Laterality: Left;    CARDIAC CATHETERIZATION N/A 2/12/2024    Procedure: Left Heart Cath;  Surgeon: Grady Garcia MD;  Location:  [facility];  Service: Cardiology;  Laterality: N/A;    CORONARY ANGIOPLASTY WITH  "STENT PLACEMENT      CYSTOSCOPY TRANSURETHRAL RESECTION OF PROSTATE      HEMORRHOIDECTOMY      HUMERUS SURGERY Left 2016    LUMBAR FUSION      REPLACEMENT TOTAL HIP LATERAL POSITION Right 2018       Review of Systems   Constitutional:  Negative for activity change, appetite change, chills and fever.   HENT:  Negative for sore throat and trouble swallowing.    Eyes:  Negative for visual disturbance.   Respiratory:  Negative for cough and shortness of breath.    Cardiovascular:  Negative for chest pain and palpitations.   Gastrointestinal:  Negative for abdominal distention, abdominal pain, blood in stool, constipation, diarrhea, nausea and vomiting.   Endocrine: Negative for cold intolerance and heat intolerance.   Genitourinary:  Negative for dysuria.   Musculoskeletal:  Negative for joint swelling.   Skin:  Negative for color change, rash and wound.   Allergic/Immunologic: Negative for immunocompromised state.   Neurological:  Negative for dizziness, seizures, weakness and headaches.   Hematological:  Negative for adenopathy. Does not bruise/bleed easily.   Psychiatric/Behavioral:  Negative for agitation and confusion.        Results        Ht 177.8 cm (70\")   Wt 71 kg (156 lb 9.6 oz)   BMI 22.47 kg/m²   Objective   Physical Exam  Constitutional:       Appearance: He is well-developed.   HENT:      Head: Normocephalic and atraumatic.   Eyes:      Conjunctiva/sclera: Conjunctivae normal.      Pupils: Pupils are equal, round, and reactive to light.   Neck:      Thyroid: No thyromegaly.      Vascular: No JVD.      Trachea: No tracheal deviation.   Cardiovascular:      Rate and Rhythm: Normal rate and regular rhythm.      Heart sounds: No murmur heard.     No friction rub. No gallop.   Pulmonary:      Effort: Pulmonary effort is normal.      Breath sounds: Normal breath sounds.   Abdominal:      General: There is no distension.      Palpations: Abdomen is soft. There is no hepatomegaly or splenomegaly.      " Tenderness: There is no abdominal tenderness.      Hernia: No hernia is present.   Musculoskeletal:         General: No deformity. Normal range of motion.      Cervical back: Neck supple.   Skin:     General: Skin is warm and dry.   Neurological:      Mental Status: He is alert and oriented to person, place, and time.       Physical Exam      Assessment & Plan            Assessment     Diagnoses and all orders for this visit:    1. RLQ abdominal pain (Primary)    Other orders  -     docusate sodium (COLACE) 250 MG capsule; Take 1 capsule by mouth 2 (Two) Times a Day As Needed for Constipation.  Dispense: 60 capsule; Refill: 1  -     polyethylene glycol (MIRALAX) 17 g packet; Take 17 g by mouth Daily.  Dispense: 30 each; Refill: 0      Assessment & Plan      Alfredito Marcus is a 74 y.o. male with right lower quadrant abdominal pain.  No evidence of hernia on the imaging or exam at his previous open appendectomy scar.  The patient will initiate bowel regimen daily and follow-up in 2 weeks to see if there is any improvement.    BMI is within normal parameters. No other follow-up for BMI required.            This document has been electronically signed by Hong Montoya MD   December 11, 2024 11:59 EST

## 2024-12-31 ENCOUNTER — OFFICE VISIT (OUTPATIENT)
Age: 74
End: 2024-12-31
Payer: MEDICARE

## 2024-12-31 VITALS — HEIGHT: 70 IN | BODY MASS INDEX: 22.33 KG/M2 | WEIGHT: 156 LBS

## 2024-12-31 DIAGNOSIS — R10.31 RLQ ABDOMINAL PAIN: Primary | ICD-10-CM

## 2024-12-31 PROCEDURE — 1159F MED LIST DOCD IN RCRD: CPT | Performed by: SURGERY

## 2024-12-31 PROCEDURE — 1160F RVW MEDS BY RX/DR IN RCRD: CPT | Performed by: SURGERY

## 2024-12-31 PROCEDURE — 99213 OFFICE O/P EST LOW 20 MIN: CPT | Performed by: SURGERY

## 2024-12-31 NOTE — PROGRESS NOTES
Subjective   Alfredito Marcus is a 74 y.o. male is being seen for consultation today at the request of Pastora Kulkarni MD    Alfredito Marcus is a 74 y.o. male with right lower quadrant abdominal pain.  On examination he has a previous open appendectomy scar.  He is on aspirin and Plavix.  He does have chronic constipation noted on imaging.  He is on no frequent regular bowel regimen.  No unintentional weight loss.  No change in bowel habits or stool caliber.  Pain somewhat improved with bowel regimen.  On CT scan there is an area of dilated distal small bowel of uncertain etiology.    History of Present Illness      Past Medical History:   Diagnosis Date    Arthritis     CAD (coronary artery disease)     Closed fracture of proximal end of left humerus 09/03/2016    Diabetes     Dyslipidemia     GERD (gastroesophageal reflux disease)     Gout     H/O degenerative disc disease     Degenerative disc disease of the cervical and lumbar spine/chronic back pain    Heart disease     History of gluten intolerance     Hypertension     Myocardial infarction     Osteoarthritis        Family History   Problem Relation Age of Onset    Arthritis Mother     Hypertension Mother     Arthritis Father     Cancer Father     Diabetes Sister     Cancer Son        Social History     Socioeconomic History    Marital status:    Tobacco Use    Smoking status: Never     Passive exposure: Past    Smokeless tobacco: Never   Vaping Use    Vaping status: Never Used   Substance and Sexual Activity    Alcohol use: Not Currently    Drug use: Never    Sexual activity: Defer       Past Surgical History:   Procedure Laterality Date    APPENDECTOMY      CARDIAC CATHETERIZATION      CARDIAC CATHETERIZATION Left 04/28/2021    Procedure: Left Heart Cath;  Surgeon: Grady Garcia MD;  Location: [facility];  Service: Cardiology;  Laterality: Left;    CARDIAC CATHETERIZATION N/A 2/12/2024    Procedure: Left Heart Cath;  Surgeon:  "Grady Garcia MD;  Location: [facility];  Service: Cardiology;  Laterality: N/A;    CORONARY ANGIOPLASTY WITH STENT PLACEMENT      CYSTOSCOPY TRANSURETHRAL RESECTION OF PROSTATE      HEMORRHOIDECTOMY      HUMERUS SURGERY Left 2016    LUMBAR FUSION      REPLACEMENT TOTAL HIP LATERAL POSITION Right 2018       Review of Systems   Constitutional:  Negative for activity change, appetite change, chills and fever.   HENT:  Negative for sore throat and trouble swallowing.    Eyes:  Negative for visual disturbance.   Respiratory:  Negative for cough and shortness of breath.    Cardiovascular:  Negative for chest pain and palpitations.   Gastrointestinal:  Negative for abdominal distention, abdominal pain, blood in stool, constipation, diarrhea, nausea and vomiting.   Endocrine: Negative for cold intolerance and heat intolerance.   Genitourinary:  Negative for dysuria.   Musculoskeletal:  Negative for joint swelling.   Skin:  Negative for color change, rash and wound.   Allergic/Immunologic: Negative for immunocompromised state.   Neurological:  Negative for dizziness, seizures, weakness and headaches.   Hematological:  Negative for adenopathy. Does not bruise/bleed easily.   Psychiatric/Behavioral:  Negative for agitation and confusion.        Results        Ht 177.8 cm (70\")   Wt 70.8 kg (156 lb)   BMI 22.38 kg/m²   Objective   Physical Exam  Constitutional:       Appearance: He is well-developed.   HENT:      Head: Normocephalic and atraumatic.   Eyes:      Conjunctiva/sclera: Conjunctivae normal.      Pupils: Pupils are equal, round, and reactive to light.   Neck:      Thyroid: No thyromegaly.      Vascular: No JVD.      Trachea: No tracheal deviation.   Cardiovascular:      Rate and Rhythm: Normal rate and regular rhythm.      Heart sounds: No murmur heard.     No friction rub. No gallop.   Pulmonary:      Effort: Pulmonary effort is normal.      Breath sounds: Normal breath sounds.   Abdominal:      " General: There is no distension.      Palpations: Abdomen is soft. There is no hepatomegaly or splenomegaly.      Tenderness: There is no abdominal tenderness.      Hernia: No hernia is present.   Musculoskeletal:         General: No deformity. Normal range of motion.      Cervical back: Neck supple.   Skin:     General: Skin is warm and dry.   Neurological:      Mental Status: He is alert and oriented to person, place, and time.       Physical Exam      Assessment & Plan            Assessment     Diagnoses and all orders for this visit:    1. RLQ abdominal pain (Primary)  -     FL Small Bowel Follow Through; Future      Assessment & Plan      Alfredito Marcus is a 74 y.o. male with right lower quadrant abdominal pain.  No evidence of hernia on the imaging or exam at his previous open appendectomy scar.  Due to some distal small bowel dilation we will obtain small bowel follow-through to see if this can be further characterized.  Otherwise continue bowel regimen.  Follow-up after imaging.    BMI is within normal parameters. No other follow-up for BMI required.            This document has been electronically signed by Hong Montoya MD   December 31, 2024 17:16 EST

## 2025-01-10 NOTE — PROGRESS NOTES
[facility] Cardiology    Encounter Date: 2025    Patient ID: Alfredito Marcus is a 74 y.o. male.  : 1950     PCP: Pastora Kulkarni MD       Chief Complaint: Coronary artery disease involving native coronary artery of      PROBLEM LIST:  Coronary artery disease:  Unstable angina with abnormal Cardiolite stress test, 2008.  [facility], 2008, Dr. Garcia: 99% LAD stenosis s/p 2 overlapping Cypher LIZZETH with balloon angioplasty of D1. Rest of the vessels is free from significant disease and LV function was normal.  Cardiolite stress test, 2011: Mild apical ischemia. EF 64%.  [facility], 2011, Dr. Garcia: Patent stents of the LAD, no significant disease. Normal EF.  [facility], 2016, Dr. Edouard: Non-flow-limiting CAD. Widely patent LAD stents. Mild (20%-30%) disease in the LAD and RCA. Normal EF.  Echocardiogram, 2020: EF 56-60%. LVDF consistent with impaired relaxation. No significant valvular heart disease. No pericardial effusion.   [facility], 2021, Dr. Garcia: EF 65%. 90% stenosis of mid LAD stented with 2.5 x 18 mm LIZZETH and reduced to 0%. Patent stents of the proximal to mid LAD. Residual nonobstructive disease of the distal LAD, the first marginal and the RCA.   Echocardiogram, 2022; EF 61-65%. Grade I diastolic dysfunction. Trace MR.   Stress test 2023: normal stress test with no evidence of ischemia  [facility], 2024: EF 65%. Nonobstructive plaque disease involving multiple vessels without any evidence of hemodynamically significant coronary artery disease.   Bilateral carotid bruits  Carotid duplex, 2020: No hemodynamically single plaques or stenoses were demonstrated in carotid systems. Both vertebral artery flows are antegrade.  Carotid CT Angiogram, 10/16/2022; Minimal atherosclerotic disease at the carotid bifurcations. There is no carotid or vertebral arterial stenosis or dissection in the neck. Negative intracranial CTA.  Palpitations  Event  monitor, 06/15/2021; SR. Rare APCs. Occasional PVCs with couplets and triplets. Rare SVT up to 17 beats.   Holter 5/4/2023: rare PAC and PVC, one 13 beat run of SVT/PAT, no symptoms  AAA screening  Abdominal US 6/12/2023:Infrarenal abdominal aortic ectasia of 2.7 cm. No aneurysm.   Hypertension.  Dyslipidemia.  DM2  Osteoarthritis.  Gout.  History of gluten intolerance.  H/O degenerative disc disease.  Closed fracture of proximal end of left humerus.  Surgical history  Cholecystectomy  Total replacement of rt hip  Appendectomy       History of Present Illness  Patient presents today for a follow-up with a history of CAD and cardiac risk factors. Since last visit, he has done fairly well from cardiac standpoint.  Has not been too active lately due to bad weather however denies current chest pain shortness of breath dizziness or syncope.  He occasionally feels palpitations and states that his blood pressure monitor detects irregular heart rhythm.  Patient had stopped his Imdur and metoprolol last year as those were making him feel poorly.    Allergies   Allergen Reactions    Shrimp (Diagnostic) Other (See Comments)     Lip swelling and hives    Shrimp Extract Unknown - Low Severity    Thimerosal Unknown - Low Severity    Iodine Unknown - Low Severity         Current Outpatient Medications:     aspirin 81 MG EC tablet, Take 1 tablet by mouth Daily., Disp: , Rfl:     calcium carbonate (OS-HIRAM) 600 MG tablet, Take 1 tablet by mouth Daily., Disp: , Rfl:     dapagliflozin Propanediol (Farxiga) 10 MG tablet, Take 10 mg by mouth Daily., Disp: , Rfl:     diclofenac (VOLTAREN) 50 MG EC tablet, , Disp: , Rfl:     docusate sodium (COLACE) 250 MG capsule, Take 1 capsule by mouth 2 (Two) Times a Day As Needed for Constipation., Disp: 60 capsule, Rfl: 1    folic acid (FOLVITE) 1 MG tablet, Take 1 tablet by mouth Daily., Disp: , Rfl:     HYDROcodone-acetaminophen (NORCO)  MG per tablet, TAKE 1/2 TABLET BY MOUTH THREE TIMES  DAILY AS NEEDED FOR PAIN, Disp: , Rfl:     hydrOXYzine (ATARAX) 50 MG tablet, Take 1 tablet by mouth Daily., Disp: , Rfl:     JANUVIA 100 MG tablet, Take 1 tablet by mouth Daily., Disp: , Rfl:     Jardiance 25 MG tablet tablet, Take 1 tablet by mouth Every Morning., Disp: , Rfl:     meclizine (ANTIVERT) 25 MG tablet, Take 1 tablet by mouth 3 (Three) Times a Day As Needed for Dizziness., Disp: 20 tablet, Rfl: 0    multivitamin tablet tablet, Take 1 tablet by mouth Daily., Disp: , Rfl:     naproxen sodium (ALEVE) 220 MG tablet, Take 1 tablet by mouth 2 (Two) Times a Day As Needed for Mild Pain., Disp: , Rfl:     nitroglycerin (NITROSTAT) 0.4 MG SL tablet, 1 under the tongue as needed for angina, may repeat q5mins for up three doses, Disp: 100 tablet, Rfl: 1    NovoLOG FlexPen 100 UNIT/ML solution pen-injector sc pen, INJECT AS DIRECTED THREE TIMES DAILY PER SLIDING SCALE. MAX OF 30 UNITS PER DAY, Disp: , Rfl:     olmesartan (BENICAR) 20 MG tablet, Take 1 tablet by mouth Daily., Disp: , Rfl:     Omega-3-Acid Eth Est, Dietary, 1 G capsule, Take 1 capsule by mouth 4 (Four) Times a Day., Disp: , Rfl:     pantoprazole (PROTONIX) 40 MG EC tablet, TAKE 1 TABLET DAILY.       DISCONTINUE PREVACID, Disp: , Rfl:     polyethylene glycol (MIRALAX) 17 g packet, Take 17 g by mouth Daily., Disp: 30 each, Rfl: 0    repaglinide (PRANDIN) 1 MG tablet, Take 1 tablet by mouth Daily., Disp: , Rfl:     rosuvastatin (CRESTOR) 20 MG tablet, Take 1 tablet by mouth Daily., Disp: , Rfl:     tiZANidine (ZANAFLEX) 2 MG tablet, Take 1 tablet by mouth every night at bedtime., Disp: , Rfl:     traMADol (ULTRAM) 50 MG tablet, Take 1 tablet by mouth Every 8 (Eight) Hours As Needed., Disp: , Rfl:     Current Facility-Administered Medications:     bupivacaine (MARCAINE) 0.5 % injection 5 mL, 5 mL, Injection, Once, Marky López, ELSY    The following portions of the patient's history were reviewed and updated as appropriate: allergies, current  "medications, past family history, past medical history, past social history, past surgical history and problem list.    ROS  Review of Systems   14 point ROS negative except for that listed in the HPI.         Objective:     /78 (BP Location: Left arm, Patient Position: Sitting)   Pulse 71   Ht 177.8 cm (70\")   Wt 73.7 kg (162 lb 6.4 oz)   SpO2 98%   BMI 23.30 kg/m²      Physical Exam  General: No apparent distress.  Neck: no JVD.  Chest:No respiratory distress, breath sounds are normal. No wheezes,  rhonchi or rales.  Cardiovascular: Normal S1 and S2, no murmur, gallop or rub.    Extremities: No edema.      Data Review:   Lab Results   Component Value Date    GLUCOSE 237 (H) 02/11/2024    BUN 13 02/11/2024    CREATININE 1.08 02/11/2024    EGFR 71.1 02/11/2024    BCR 12.0 02/11/2024     02/11/2024    K 4.6 02/11/2024    CO2 27.2 02/11/2024    CALCIUM 9.4 02/11/2024    ALBUMIN 4.8 02/11/2024    AST 28 02/11/2024    ALT 27 02/11/2024     Lab Results   Component Value Date    CHOL 101 02/12/2024    CHLPL 116 05/30/2016    TRIG 162 (H) 02/12/2024    HDL 35 (L) 02/12/2024    LDL 39 02/12/2024      Lab Results   Component Value Date    WBC 6.67 02/11/2024    RBC 4.86 02/11/2024    HGB 16.1 02/11/2024    HCT 46.7 02/11/2024    MCV 96.1 02/11/2024     02/11/2024     Lab Results   Component Value Date    TSH 2.239 08/16/2016     Lab Results   Component Value Date    HGBA1C 6.4 (H) 05/30/2016     Lab date: 08/29/2024  CMP: Glu 212, BUN 19, Creat 1.24, eGFR 60.3, Na 137, K 5.0, Cl 101, CO2 27.2, Ca 9.3, Alk Phos 112, AST 19, ALT 16    Lab date: 09/16/2024  FLP: , , HDL 39, LDL 75  CBC: WBC 8.75, RBC 4.93, HGB 15.8, HCT 45.6, MCV 92.5, MCH 32.0,   HbA1c: 7.90       Procedures       Advance Care Planning   ACP discussion was declined by the patient. Patient does not have an advance directive, declines further assistance.           Assessment:      Diagnosis Plan   1. Coronary artery " disease involving native coronary artery of native heart without angina pectoris  Aspirin to be continued for antiplatelet therapy and rest of the current GDMT.  He does not have current angina or CHF symptoms.      2. Palpitations  7-day cardiac monitor for further assessment.      3. Essential hypertension  Controlled, continue olmesartan      4. Dyslipidemia  Well-controlled, continue Crestor.        Plan:   Stable cardiac status.  No angina or CHF symptoms.  Has occasional palpitations.  7-day monitor for further assessment.  Continue current medications.   FU in 6 MO, sooner as needed.  Thank you for allowing us to participate in the care of your patient.       Grady Garcia MD, FACC, [facility]        Please note that portions of this note may have been completed with a voice recognition program. Efforts were made to edit the dictations, but occasionally words are mistranscribed.

## 2025-01-13 ENCOUNTER — HOSPITAL ENCOUNTER (OUTPATIENT)
Dept: GENERAL RADIOLOGY | Facility: HOSPITAL | Age: 75
Discharge: HOME | End: 2025-01-13
Admitting: SURGERY
Payer: MEDICARE

## 2025-01-13 DIAGNOSIS — R10.31 RLQ ABDOMINAL PAIN: ICD-10-CM

## 2025-01-13 PROCEDURE — 74250 X-RAY XM SM INT 1CNTRST STD: CPT | Performed by: RADIOLOGY

## 2025-01-13 PROCEDURE — 74250 X-RAY XM SM INT 1CNTRST STD: CPT

## 2025-01-14 ENCOUNTER — OFFICE VISIT (OUTPATIENT)
Dept: CARDIOLOGY | Facility: CLINIC | Age: 75
End: 2025-01-14
Payer: MEDICARE

## 2025-01-14 VITALS
WEIGHT: 162.4 LBS | HEIGHT: 70 IN | SYSTOLIC BLOOD PRESSURE: 132 MMHG | BODY MASS INDEX: 23.25 KG/M2 | DIASTOLIC BLOOD PRESSURE: 78 MMHG | OXYGEN SATURATION: 98 % | HEART RATE: 71 BPM

## 2025-01-14 DIAGNOSIS — R00.2 PALPITATIONS: ICD-10-CM

## 2025-01-14 DIAGNOSIS — R00.2 PALPITATIONS: Primary | ICD-10-CM

## 2025-01-14 DIAGNOSIS — E78.5 DYSLIPIDEMIA: ICD-10-CM

## 2025-01-14 DIAGNOSIS — I10 ESSENTIAL HYPERTENSION: ICD-10-CM

## 2025-01-14 DIAGNOSIS — I25.10 CORONARY ARTERY DISEASE INVOLVING NATIVE CORONARY ARTERY OF NATIVE HEART WITHOUT ANGINA PECTORIS: Primary | ICD-10-CM

## 2025-01-14 PROCEDURE — 99214 OFFICE O/P EST MOD 30 MIN: CPT | Performed by: INTERNAL MEDICINE

## 2025-01-14 PROCEDURE — 3075F SYST BP GE 130 - 139MM HG: CPT | Performed by: INTERNAL MEDICINE

## 2025-01-14 PROCEDURE — 3078F DIAST BP <80 MM HG: CPT | Performed by: INTERNAL MEDICINE

## 2025-01-14 PROCEDURE — 1160F RVW MEDS BY RX/DR IN RCRD: CPT | Performed by: INTERNAL MEDICINE

## 2025-01-14 PROCEDURE — 1159F MED LIST DOCD IN RCRD: CPT | Performed by: INTERNAL MEDICINE

## 2025-01-21 ENCOUNTER — OFFICE VISIT (OUTPATIENT)
Age: 75
End: 2025-01-21
Payer: MEDICARE

## 2025-01-21 VITALS — HEIGHT: 70 IN | WEIGHT: 162 LBS | BODY MASS INDEX: 23.19 KG/M2

## 2025-01-21 DIAGNOSIS — R10.31 RLQ ABDOMINAL PAIN: Primary | ICD-10-CM

## 2025-01-21 PROCEDURE — 1160F RVW MEDS BY RX/DR IN RCRD: CPT | Performed by: SURGERY

## 2025-01-21 PROCEDURE — 1159F MED LIST DOCD IN RCRD: CPT | Performed by: SURGERY

## 2025-01-21 PROCEDURE — 99213 OFFICE O/P EST LOW 20 MIN: CPT | Performed by: SURGERY

## 2025-01-21 NOTE — PROGRESS NOTES
Subjective   Alfredito Marcus is a 75 y.o. male is being seen for consultation today at the request of Pastora Kulkarni MD    Alfredito Marcus is a 75 y.o. male with right lower quadrant abdominal pain.  On examination he has a previous open appendectomy scar.  He is on aspirin and Plavix.  He does have chronic constipation noted on imaging.  He is on no frequent regular bowel regimen.  No unintentional weight loss.  No change in bowel habits or stool caliber.  Pain somewhat improved with bowel regimen.  On CT scan there is an area of dilated distal small bowel of uncertain etiology.  Small bowel follow-through within the limits with no small bowel dilation.  His pain is continuing to improve with his bowel regimen which she will continue for now.    History of Present Illness      Past Medical History:   Diagnosis Date    Arthritis     CAD (coronary artery disease)     Closed fracture of proximal end of left humerus 09/03/2016    Diabetes     Dyslipidemia     GERD (gastroesophageal reflux disease)     Gout     H/O degenerative disc disease     Degenerative disc disease of the cervical and lumbar spine/chronic back pain    Heart disease     History of gluten intolerance     Hypertension     Myocardial infarction     Osteoarthritis        Family History   Problem Relation Age of Onset    Arthritis Mother     Hypertension Mother     Arthritis Father     Cancer Father     Diabetes Sister     Cancer Son        Social History     Socioeconomic History    Marital status:    Tobacco Use    Smoking status: Never     Passive exposure: Past    Smokeless tobacco: Never   Vaping Use    Vaping status: Never Used   Substance and Sexual Activity    Alcohol use: Not Currently    Drug use: Never    Sexual activity: Defer       Past Surgical History:   Procedure Laterality Date    APPENDECTOMY      CARDIAC CATHETERIZATION      CARDIAC CATHETERIZATION Left 04/28/2021    Procedure: Left Heart Cath;  Surgeon: Grady Garcia MD;   "Location:  [facility];  Service: Cardiology;  Laterality: Left;    CARDIAC CATHETERIZATION N/A 2/12/2024    Procedure: Left Heart Cath;  Surgeon: Grady Garcia MD;  Location:  [facility];  Service: Cardiology;  Laterality: N/A;    CORONARY ANGIOPLASTY WITH STENT PLACEMENT      CYSTOSCOPY TRANSURETHRAL RESECTION OF PROSTATE      HEMORRHOIDECTOMY      HUMERUS SURGERY Left 2016    LUMBAR FUSION      REPLACEMENT TOTAL HIP LATERAL POSITION Right 2018       Review of Systems   Constitutional:  Negative for activity change, appetite change, chills and fever.   HENT:  Negative for sore throat and trouble swallowing.    Eyes:  Negative for visual disturbance.   Respiratory:  Negative for cough and shortness of breath.    Cardiovascular:  Negative for chest pain and palpitations.   Gastrointestinal:  Negative for abdominal distention, abdominal pain, blood in stool, constipation, diarrhea, nausea and vomiting.   Endocrine: Negative for cold intolerance and heat intolerance.   Genitourinary:  Negative for dysuria.   Musculoskeletal:  Negative for joint swelling.   Skin:  Negative for color change, rash and wound.   Allergic/Immunologic: Negative for immunocompromised state.   Neurological:  Negative for dizziness, seizures, weakness and headaches.   Hematological:  Negative for adenopathy. Does not bruise/bleed easily.   Psychiatric/Behavioral:  Negative for agitation and confusion.        Results        Ht 177.8 cm (70\")   Wt 73.5 kg (162 lb)   BMI 23.24 kg/m²   Objective   Physical Exam  Constitutional:       Appearance: He is well-developed.   HENT:      Head: Normocephalic and atraumatic.   Eyes:      Conjunctiva/sclera: Conjunctivae normal.      Pupils: Pupils are equal, round, and reactive to light.   Neck:      Thyroid: No thyromegaly.      Vascular: No JVD.      Trachea: No tracheal deviation.   Cardiovascular:      Rate and Rhythm: Normal rate and regular rhythm.      Heart " sounds: No murmur heard.     No friction rub. No gallop.   Pulmonary:      Effort: Pulmonary effort is normal.      Breath sounds: Normal breath sounds.   Abdominal:      General: There is no distension.      Palpations: Abdomen is soft. There is no hepatomegaly or splenomegaly.      Tenderness: There is no abdominal tenderness.      Hernia: No hernia is present.   Musculoskeletal:         General: No deformity. Normal range of motion.      Cervical back: Neck supple.   Skin:     General: Skin is warm and dry.   Neurological:      Mental Status: He is alert and oriented to person, place, and time.       Physical Exam      Assessment & Plan            Assessment     Diagnoses and all orders for this visit:    1. RLQ abdominal pain (Primary)      Assessment & Plan      Alfredito Marcus is a 75 y.o. male with right lower quadrant abdominal pain.  No evidence of hernia on the imaging or exam at his previous open appendectomy scar.  Small bowel follow-through within normal limits.  No evidence of distal bowel dilation or obstruction.  Patient's pain is improving with continued bowel regimen.  Follow-up as needed.    BMI is within normal parameters. No other follow-up for BMI required.            This document has been electronically signed by Hong Montoya MD   January 21, 2025 15:46 EST

## 2025-01-27 ENCOUNTER — TELEPHONE (OUTPATIENT)
Dept: CARDIOLOGY | Facility: CLINIC | Age: 75
End: 2025-01-27

## 2025-01-27 LAB
CV ZIO BASELINE AVG BPM: 71 BPM
CV ZIO BASELINE BPM HIGH: 171 BPM
CV ZIO BASELINE BPM LOW: 51 BPM
CV ZIO DEVICE ANALYSIS TIME: NORMAL
CV ZIO ECT SVE COUNT: 568 EPISODES
CV ZIO ECT SVE CPLT COUNT: 22 EPISODES
CV ZIO ECT SVE CPLT FREQ: NORMAL
CV ZIO ECT SVE FREQ: NORMAL
CV ZIO ECT SVE TPLT COUNT: 7 EPISODES
CV ZIO ECT SVE TPLT FREQ: NORMAL
CV ZIO ECT VE COUNT: NORMAL EPISODES
CV ZIO ECT VE CPLT COUNT: 185 EPISODES
CV ZIO ECT VE CPLT FREQ: NORMAL
CV ZIO ECT VE FREQ: NORMAL
CV ZIO ECT VE TPLT COUNT: 29 EPISODES
CV ZIO ECT VE TPLT FREQ: NORMAL
CV ZIO ECTOPIC SVE COUPLET RAW PERCENT: 0.01 %
CV ZIO ECTOPIC SVE ISOLATED PERCENT: 0.08 %
CV ZIO ECTOPIC SVE TRIPLET RAW PERCENT: 0 %
CV ZIO ECTOPIC VE COUPLET RAW PERCENT: 0.05 %
CV ZIO ECTOPIC VE ISOLATED PERCENT: 3.77 %
CV ZIO ECTOPIC VE TRIPLET RAW PERCENT: 0.01 %
CV ZIO ENROLLMENT END: NORMAL
CV ZIO ENROLLMENT START: NORMAL
CV ZIO L BIGEMINY DUR: 10.2 SEC
CV ZIO L BIGEMINY END: NORMAL
CV ZIO L BIGEMINY START: NORMAL
CV ZIO L TRIGEMINY DUR: 34.7 SEC
CV ZIO L TRIGEMINY END: NORMAL
CV ZIO L TRIGEMINY START: NORMAL
CV ZIO PATIENT EVENTS DIARIES: 1
CV ZIO PATIENT EVENTS TRIGGERS: 1
CV ZIO PAUSE COUNT: 0
CV ZIO PRESCRIPTION STATUS: NORMAL
CV ZIO SVT AVG BPM: 125 BPM
CV ZIO SVT BPM HIGH: 171 BPM
CV ZIO SVT BPM LOW: 76 BPM
CV ZIO SVT COUNT: 11
CV ZIO SVT F EPI AVG BPM: 150 BPM
CV ZIO SVT F EPI BEATS: 6 BEATS
CV ZIO SVT F EPI BPM HIGH: 171 BPM
CV ZIO SVT F EPI BPM LOW: 115 BPM
CV ZIO SVT F EPI DUR: 2.2 SEC
CV ZIO SVT F EPI END: NORMAL
CV ZIO SVT F EPI START: NORMAL
CV ZIO SVT L EPI AVG BPM: 102 BPM
CV ZIO SVT L EPI BEATS: 17 BEATS
CV ZIO SVT L EPI BPM HIGH: 119 BPM
CV ZIO SVT L EPI BPM LOW: 76 BPM
CV ZIO SVT L EPI DUR: 9.9 SEC
CV ZIO SVT L EPI END: NORMAL
CV ZIO SVT L EPI START: NORMAL
CV ZIO TOTAL  ENROLLMENT PERIOD: NORMAL
CV ZIO VT COUNT: 0

## 2025-01-27 RX ORDER — BISOPROLOL FUMARATE 5 MG/1
2.5 TABLET, FILM COATED ORAL DAILY
Qty: 30 TABLET | Refills: 5 | Status: SHIPPED | OUTPATIENT
Start: 2025-01-27

## 2025-01-27 NOTE — TELEPHONE ENCOUNTER
Called pt to make him aware of Dr. Garcia's reading of his monitor results and his recommendations for bisoprolol and a sleep study. Pt advised on how to take bisoprolol and to call us if he has any issues, and to f/u with his PCP to schedule a sleep study. He stated he would see her soon and let her know.

## 2025-01-28 ENCOUNTER — TRANSCRIBE ORDERS (OUTPATIENT)
Dept: ADMINISTRATIVE | Facility: HOSPITAL | Age: 75
End: 2025-01-28
Payer: MEDICARE

## 2025-01-28 DIAGNOSIS — M47.22 CERVICAL SPONDYLOSIS WITH RADICULOPATHY: Primary | ICD-10-CM

## 2025-02-14 ENCOUNTER — HOSPITAL ENCOUNTER (OUTPATIENT)
Dept: MRI IMAGING | Facility: HOSPITAL | Age: 75
Discharge: HOME | End: 2025-02-14
Payer: MEDICARE

## 2025-02-14 DIAGNOSIS — M47.22 CERVICAL SPONDYLOSIS WITH RADICULOPATHY: ICD-10-CM

## 2025-02-14 PROCEDURE — 72141 MRI NECK SPINE W/O DYE: CPT

## 2025-02-23 ENCOUNTER — APPOINTMENT (OUTPATIENT)
Dept: GENERAL RADIOLOGY | Facility: HOSPITAL | Age: 75
End: 2025-02-23
Payer: MEDICARE

## 2025-02-23 ENCOUNTER — HOSPITAL ENCOUNTER (OUTPATIENT)
Facility: HOSPITAL | Age: 75
Setting detail: OBSERVATION
Discharge: HOME | End: 2025-02-24
Attending: STUDENT IN AN ORGANIZED HEALTH CARE EDUCATION/TRAINING PROGRAM
Payer: MEDICARE

## 2025-02-23 DIAGNOSIS — R07.9 CHEST PAIN, UNSPECIFIED TYPE: Primary | ICD-10-CM

## 2025-02-23 PROBLEM — I86.1 SCROTAL VARICOSE VEINS: Status: ACTIVE | Noted: 2025-02-23

## 2025-02-23 PROBLEM — R10.31 RLQ ABDOMINAL PAIN: Status: RESOLVED | Noted: 2024-02-24 | Resolved: 2025-02-23

## 2025-02-23 PROBLEM — N52.9 ERECTILE DYSFUNCTION: Status: ACTIVE | Noted: 2023-01-05

## 2025-02-23 PROBLEM — N13.8 BENIGN PROSTATIC HYPERPLASIA WITH URINARY OBSTRUCTION: Status: ACTIVE | Noted: 2025-02-23

## 2025-02-23 PROBLEM — N40.1 BENIGN PROSTATIC HYPERPLASIA WITH URINARY OBSTRUCTION: Status: ACTIVE | Noted: 2025-02-23

## 2025-02-23 PROBLEM — I20.9 ANGINA PECTORIS: Status: RESOLVED | Noted: 2021-04-26 | Resolved: 2025-02-23

## 2025-02-23 PROBLEM — E11.42 DIABETIC PERIPHERAL NEUROPATHY ASSOCIATED WITH TYPE 2 DIABETES MELLITUS: Status: ACTIVE | Noted: 2022-08-27

## 2025-02-23 PROBLEM — D29.4 ANGIOKERATOMA OF SCROTUM: Status: ACTIVE | Noted: 2023-01-31

## 2025-02-23 PROBLEM — R39.15 URINARY URGENCY: Status: ACTIVE | Noted: 2025-02-23

## 2025-02-23 LAB
ALBUMIN SERPL-MCNC: 3.8 G/DL (ref 3.5–5.2)
ALBUMIN/GLOB SERPL: 1.7 G/DL
ALP SERPL-CCNC: 86 U/L (ref 39–117)
ALT SERPL W P-5'-P-CCNC: 15 U/L (ref 1–41)
ANION GAP SERPL CALCULATED.3IONS-SCNC: 8.4 MMOL/L (ref 5–15)
AST SERPL-CCNC: 20 U/L (ref 1–40)
BASOPHILS # BLD AUTO: 0.04 10*3/MM3 (ref 0–0.2)
BASOPHILS NFR BLD AUTO: 0.7 % (ref 0–1.5)
BILIRUB SERPL-MCNC: 0.9 MG/DL (ref 0–1.2)
BUN SERPL-MCNC: 15 MG/DL (ref 8–23)
BUN/CREAT SERPL: 15.8 (ref 7–25)
CALCIUM SPEC-SCNC: 8.6 MG/DL (ref 8.6–10.5)
CHLORIDE SERPL-SCNC: 104 MMOL/L (ref 98–107)
CO2 SERPL-SCNC: 28.6 MMOL/L (ref 22–29)
CREAT SERPL-MCNC: 0.95 MG/DL (ref 0.76–1.27)
DEPRECATED RDW RBC AUTO: 46.6 FL (ref 37–54)
EGFRCR SERPLBLD CKD-EPI 2021: 82.4 ML/MIN/1.73
EOSINOPHIL # BLD AUTO: 0.25 10*3/MM3 (ref 0–0.4)
EOSINOPHIL NFR BLD AUTO: 4.1 % (ref 0.3–6.2)
ERYTHROCYTE [DISTWIDTH] IN BLOOD BY AUTOMATED COUNT: 13.5 % (ref 12.3–15.4)
GEN 5 1HR TROPONIN T REFLEX: 16 NG/L
GLOBULIN UR ELPH-MCNC: 2.2 GM/DL
GLUCOSE SERPL-MCNC: 160 MG/DL (ref 65–99)
HCT VFR BLD AUTO: 40.9 % (ref 37.5–51)
HGB BLD-MCNC: 13.4 G/DL (ref 13–17.7)
HOLD SPECIMEN: NORMAL
HOLD SPECIMEN: NORMAL
IMM GRANULOCYTES # BLD AUTO: 0.01 10*3/MM3 (ref 0–0.05)
IMM GRANULOCYTES NFR BLD AUTO: 0.2 % (ref 0–0.5)
LYMPHOCYTES # BLD AUTO: 1.51 10*3/MM3 (ref 0.7–3.1)
LYMPHOCYTES NFR BLD AUTO: 24.9 % (ref 19.6–45.3)
MCH RBC QN AUTO: 30.7 PG (ref 26.6–33)
MCHC RBC AUTO-ENTMCNC: 32.8 G/DL (ref 31.5–35.7)
MCV RBC AUTO: 93.8 FL (ref 79–97)
MONOCYTES # BLD AUTO: 0.73 10*3/MM3 (ref 0.1–0.9)
MONOCYTES NFR BLD AUTO: 12 % (ref 5–12)
NEUTROPHILS NFR BLD AUTO: 3.52 10*3/MM3 (ref 1.7–7)
NEUTROPHILS NFR BLD AUTO: 58.1 % (ref 42.7–76)
NRBC BLD AUTO-RTO: 0 /100 WBC (ref 0–0.2)
NT-PROBNP SERPL-MCNC: 171.1 PG/ML (ref 0–1800)
PLATELET # BLD AUTO: 147 10*3/MM3 (ref 140–450)
PMV BLD AUTO: 11.2 FL (ref 6–12)
POTASSIUM SERPL-SCNC: 3.8 MMOL/L (ref 3.5–5.2)
PROT SERPL-MCNC: 6 G/DL (ref 6–8.5)
RBC # BLD AUTO: 4.36 10*6/MM3 (ref 4.14–5.8)
SODIUM SERPL-SCNC: 141 MMOL/L (ref 136–145)
TROPONIN T NUMERIC DELTA: 1 NG/L
TROPONIN T SERPL HS-MCNC: 15 NG/L
WBC NRBC COR # BLD AUTO: 6.06 10*3/MM3 (ref 3.4–10.8)
WHOLE BLOOD HOLD COAG: NORMAL
WHOLE BLOOD HOLD SPECIMEN: NORMAL

## 2025-02-23 PROCEDURE — 84443 ASSAY THYROID STIM HORMONE: CPT

## 2025-02-23 PROCEDURE — 93005 ELECTROCARDIOGRAM TRACING: CPT

## 2025-02-23 PROCEDURE — 36415 COLL VENOUS BLD VENIPUNCTURE: CPT

## 2025-02-23 PROCEDURE — 84484 ASSAY OF TROPONIN QUANT: CPT

## 2025-02-23 PROCEDURE — 83880 ASSAY OF NATRIURETIC PEPTIDE: CPT

## 2025-02-23 PROCEDURE — 83036 HEMOGLOBIN GLYCOSYLATED A1C: CPT

## 2025-02-23 PROCEDURE — 99285 EMERGENCY DEPT VISIT HI MDM: CPT

## 2025-02-23 PROCEDURE — 80053 COMPREHEN METABOLIC PANEL: CPT

## 2025-02-23 PROCEDURE — 85025 COMPLETE CBC W/AUTO DIFF WBC: CPT

## 2025-02-23 PROCEDURE — 84439 ASSAY OF FREE THYROXINE: CPT

## 2025-02-23 PROCEDURE — G0378 HOSPITAL OBSERVATION PER HR: HCPCS

## 2025-02-23 PROCEDURE — 71045 X-RAY EXAM CHEST 1 VIEW: CPT

## 2025-02-23 RX ORDER — SODIUM CHLORIDE 0.9 % (FLUSH) 0.9 %
10 SYRINGE (ML) INJECTION AS NEEDED
Status: DISCONTINUED | OUTPATIENT
Start: 2025-02-23 | End: 2025-02-24 | Stop reason: HOSPADM

## 2025-02-23 RX ORDER — ASPIRIN 81 MG/1
324 TABLET, CHEWABLE ORAL ONCE
Status: COMPLETED | OUTPATIENT
Start: 2025-02-23 | End: 2025-02-23

## 2025-02-23 RX ADMIN — ASPIRIN 324 MG: 81 TABLET, CHEWABLE ORAL at 21:56

## 2025-02-24 ENCOUNTER — APPOINTMENT (OUTPATIENT)
Dept: CARDIOLOGY | Facility: HOSPITAL | Age: 75
End: 2025-02-24
Payer: MEDICARE

## 2025-02-24 ENCOUNTER — APPOINTMENT (OUTPATIENT)
Dept: NUCLEAR MEDICINE | Facility: HOSPITAL | Age: 75
End: 2025-02-24
Payer: MEDICARE

## 2025-02-24 VITALS
DIASTOLIC BLOOD PRESSURE: 70 MMHG | SYSTOLIC BLOOD PRESSURE: 145 MMHG | TEMPERATURE: 97.6 F | HEART RATE: 48 BPM | HEIGHT: 70 IN | WEIGHT: 172.18 LBS | BODY MASS INDEX: 24.65 KG/M2 | RESPIRATION RATE: 18 BRPM | OXYGEN SATURATION: 93 %

## 2025-02-24 PROBLEM — R07.9 CHEST PAIN: Status: ACTIVE | Noted: 2025-02-24

## 2025-02-24 PROBLEM — R07.9 CHEST PAIN: Status: RESOLVED | Noted: 2025-02-24 | Resolved: 2025-02-24

## 2025-02-24 LAB
ALBUMIN SERPL-MCNC: 4 G/DL (ref 3.5–5.2)
ALBUMIN/GLOB SERPL: 1.7 G/DL
ALP SERPL-CCNC: 90 U/L (ref 39–117)
ALT SERPL W P-5'-P-CCNC: 16 U/L (ref 1–41)
ANION GAP SERPL CALCULATED.3IONS-SCNC: 7.1 MMOL/L (ref 5–15)
AST SERPL-CCNC: 24 U/L (ref 1–40)
AV MEAN PRESS GRAD SYS DOP V1V2: 4 MMHG
AV VMAX SYS DOP: 121 CM/SEC
BASOPHILS # BLD AUTO: 0.05 10*3/MM3 (ref 0–0.2)
BASOPHILS NFR BLD AUTO: 0.7 % (ref 0–1.5)
BH CV ECHO MEAS - ACS: 1.6 CM
BH CV ECHO MEAS - AO MAX PG: 5.9 MMHG
BH CV ECHO MEAS - AO ROOT DIAM: 3.2 CM
BH CV ECHO MEAS - AO V2 VTI: 32.6 CM
BH CV ECHO MEAS - EDV(CUBED): 148.9 ML
BH CV ECHO MEAS - EDV(MOD-SP2): 64.2 ML
BH CV ECHO MEAS - EDV(MOD-SP4): 68.9 ML
BH CV ECHO MEAS - EF(MOD-SP2): 63.7 %
BH CV ECHO MEAS - EF(MOD-SP4): 65.7 %
BH CV ECHO MEAS - ESV(CUBED): 29.8 ML
BH CV ECHO MEAS - ESV(MOD-SP2): 23.3 ML
BH CV ECHO MEAS - ESV(MOD-SP4): 23.6 ML
BH CV ECHO MEAS - FS: 41.5 %
BH CV ECHO MEAS - IVS/LVPW: 1.1 CM
BH CV ECHO MEAS - IVSD: 1.1 CM
BH CV ECHO MEAS - LA DIMENSION: 4.2 CM
BH CV ECHO MEAS - LAT PEAK E' VEL: 7.7 CM/SEC
BH CV ECHO MEAS - LV DIASTOLIC VOL/BSA (35-75): 35.2 CM2
BH CV ECHO MEAS - LV MASS(C)D: 213.9 GRAMS
BH CV ECHO MEAS - LV SYSTOLIC VOL/BSA (12-30): 12.1 CM2
BH CV ECHO MEAS - LVIDD: 5.3 CM
BH CV ECHO MEAS - LVIDS: 3.1 CM
BH CV ECHO MEAS - LVOT AREA: 3.1 CM2
BH CV ECHO MEAS - LVOT DIAM: 2 CM
BH CV ECHO MEAS - LVPWD: 1 CM
BH CV ECHO MEAS - MED PEAK E' VEL: 6.4 CM/SEC
BH CV ECHO MEAS - MV A MAX VEL: 87 CM/SEC
BH CV ECHO MEAS - MV E MAX VEL: 107 CM/SEC
BH CV ECHO MEAS - MV E/A: 1.23
BH CV ECHO MEAS - PA ACC TIME: 0.15 SEC
BH CV ECHO MEAS - RAP SYSTOLE: 10 MMHG
BH CV ECHO MEAS - RVSP: 42.5 MMHG
BH CV ECHO MEAS - SV(MOD-SP2): 40.9 ML
BH CV ECHO MEAS - SV(MOD-SP4): 45.3 ML
BH CV ECHO MEAS - SVI(MOD-SP2): 20.9 ML/M2
BH CV ECHO MEAS - SVI(MOD-SP4): 23.1 ML/M2
BH CV ECHO MEAS - TAPSE (>1.6): 2.23 CM
BH CV ECHO MEAS - TR MAX PG: 32.5 MMHG
BH CV ECHO MEAS - TR MAX VEL: 285 CM/SEC
BH CV ECHO MEASUREMENTS AVERAGE E/E' RATIO: 15.18
BH CV NUCLEAR PRIOR STUDY: 3
BH CV REST NUCLEAR ISOTOPE DOSE: 10 MCI
BH CV STRESS BP STAGE 1: NORMAL
BH CV STRESS COMMENTS STAGE 1: NORMAL
BH CV STRESS DOSE REGADENOSON STAGE 1: 0.4
BH CV STRESS DURATION MIN STAGE 1: 0
BH CV STRESS DURATION SEC STAGE 1: 10
BH CV STRESS HR STAGE 1: 79
BH CV STRESS NUCLEAR ISOTOPE DOSE: 30.2 MCI
BH CV STRESS PROTOCOL 1: NORMAL
BH CV STRESS RECOVERY BP: NORMAL MMHG
BH CV STRESS RECOVERY HR: 55 BPM
BH CV STRESS STAGE 1: 1
BILIRUB SERPL-MCNC: 0.7 MG/DL (ref 0–1.2)
BUN SERPL-MCNC: 14 MG/DL (ref 8–23)
BUN/CREAT SERPL: 14.9 (ref 7–25)
CALCIUM SPEC-SCNC: 9.1 MG/DL (ref 8.6–10.5)
CHLORIDE SERPL-SCNC: 104 MMOL/L (ref 98–107)
CHOLEST SERPL-MCNC: 99 MG/DL (ref 0–200)
CO2 SERPL-SCNC: 28.9 MMOL/L (ref 22–29)
CREAT SERPL-MCNC: 0.94 MG/DL (ref 0.76–1.27)
DEPRECATED RDW RBC AUTO: 46.2 FL (ref 37–54)
EGFRCR SERPLBLD CKD-EPI 2021: 83.5 ML/MIN/1.73
EOSINOPHIL # BLD AUTO: 0.25 10*3/MM3 (ref 0–0.4)
EOSINOPHIL NFR BLD AUTO: 3.3 % (ref 0.3–6.2)
ERYTHROCYTE [DISTWIDTH] IN BLOOD BY AUTOMATED COUNT: 13.7 % (ref 12.3–15.4)
GLOBULIN UR ELPH-MCNC: 2.3 GM/DL
GLUCOSE BLDC GLUCOMTR-MCNC: 143 MG/DL (ref 70–130)
GLUCOSE BLDC GLUCOMTR-MCNC: 219 MG/DL (ref 70–130)
GLUCOSE BLDC GLUCOMTR-MCNC: 87 MG/DL (ref 70–130)
GLUCOSE SERPL-MCNC: 100 MG/DL (ref 65–99)
HBA1C MFR BLD: 6.9 % (ref 4.8–5.6)
HCT VFR BLD AUTO: 43.9 % (ref 37.5–51)
HDLC SERPL-MCNC: 42 MG/DL (ref 40–60)
HGB BLD-MCNC: 14.4 G/DL (ref 13–17.7)
IMM GRANULOCYTES # BLD AUTO: 0.03 10*3/MM3 (ref 0–0.05)
IMM GRANULOCYTES NFR BLD AUTO: 0.4 % (ref 0–0.5)
LDLC SERPL CALC-MCNC: 43 MG/DL (ref 0–100)
LDLC/HDLC SERPL: 1.06 {RATIO}
LEFT ATRIUM VOLUME INDEX: 21.5 ML/M2
LV EF BIPLANE MOD: 63 %
LYMPHOCYTES # BLD AUTO: 1.63 10*3/MM3 (ref 0.7–3.1)
LYMPHOCYTES NFR BLD AUTO: 21.2 % (ref 19.6–45.3)
MAXIMAL PREDICTED HEART RATE: 143 BPM
MCH RBC QN AUTO: 30.4 PG (ref 26.6–33)
MCHC RBC AUTO-ENTMCNC: 32.8 G/DL (ref 31.5–35.7)
MCV RBC AUTO: 92.8 FL (ref 79–97)
MONOCYTES # BLD AUTO: 0.74 10*3/MM3 (ref 0.1–0.9)
MONOCYTES NFR BLD AUTO: 9.6 % (ref 5–12)
NEUTROPHILS NFR BLD AUTO: 4.98 10*3/MM3 (ref 1.7–7)
NEUTROPHILS NFR BLD AUTO: 64.8 % (ref 42.7–76)
NRBC BLD AUTO-RTO: 0 /100 WBC (ref 0–0.2)
PERCENT MAX PREDICTED HR: 55.24 %
PLATELET # BLD AUTO: 148 10*3/MM3 (ref 140–450)
PMV BLD AUTO: 11.5 FL (ref 6–12)
POTASSIUM SERPL-SCNC: 3.9 MMOL/L (ref 3.5–5.2)
PROT SERPL-MCNC: 6.3 G/DL (ref 6–8.5)
QT INTERVAL: 454 MS
QT INTERVAL: 468 MS
QT INTERVAL: 488 MS
QTC INTERVAL: 418 MS
QTC INTERVAL: 422 MS
QTC INTERVAL: 431 MS
RBC # BLD AUTO: 4.73 10*6/MM3 (ref 4.14–5.8)
SODIUM SERPL-SCNC: 140 MMOL/L (ref 136–145)
SPECT HRT GATED+EF W RNC IV: 71 %
STRESS BASELINE BP: NORMAL MMHG
STRESS BASELINE HR: 52 BPM
STRESS PERCENT HR: 65 %
STRESS POST PEAK BP: NORMAL MMHG
STRESS POST PEAK HR: 79 BPM
STRESS TARGET HR: 122 BPM
T4 FREE SERPL-MCNC: 1.28 NG/DL (ref 0.92–1.68)
TRIGL SERPL-MCNC: 63 MG/DL (ref 0–150)
TROPONIN T SERPL HS-MCNC: 17 NG/L
TSH SERPL DL<=0.05 MIU/L-ACNC: 4.6 UIU/ML (ref 0.27–4.2)
VLDLC SERPL-MCNC: 14 MG/DL (ref 5–40)
WBC NRBC COR # BLD AUTO: 7.68 10*3/MM3 (ref 3.4–10.8)

## 2025-02-24 PROCEDURE — G0378 HOSPITAL OBSERVATION PER HR: HCPCS

## 2025-02-24 PROCEDURE — 78452 HT MUSCLE IMAGE SPECT MULT: CPT | Performed by: INTERNAL MEDICINE

## 2025-02-24 PROCEDURE — 71045 X-RAY EXAM CHEST 1 VIEW: CPT | Performed by: RADIOLOGY

## 2025-02-24 PROCEDURE — 99236 HOSP IP/OBS SAME DATE HI 85: CPT | Performed by: INTERNAL MEDICINE

## 2025-02-24 PROCEDURE — 82948 REAGENT STRIP/BLOOD GLUCOSE: CPT

## 2025-02-24 PROCEDURE — A9500 TC99M SESTAMIBI: HCPCS | Performed by: INTERNAL MEDICINE

## 2025-02-24 PROCEDURE — 93017 CV STRESS TEST TRACING ONLY: CPT

## 2025-02-24 PROCEDURE — 84484 ASSAY OF TROPONIN QUANT: CPT

## 2025-02-24 PROCEDURE — 96372 THER/PROPH/DIAG INJ SC/IM: CPT

## 2025-02-24 PROCEDURE — 63710000001 INSULIN LISPRO (HUMAN) PER 5 UNITS

## 2025-02-24 PROCEDURE — 85025 COMPLETE CBC W/AUTO DIFF WBC: CPT

## 2025-02-24 PROCEDURE — 93306 TTE W/DOPPLER COMPLETE: CPT | Performed by: INTERNAL MEDICINE

## 2025-02-24 PROCEDURE — 93306 TTE W/DOPPLER COMPLETE: CPT

## 2025-02-24 PROCEDURE — 78452 HT MUSCLE IMAGE SPECT MULT: CPT

## 2025-02-24 PROCEDURE — 80061 LIPID PANEL: CPT

## 2025-02-24 PROCEDURE — 25010000002 HEPARIN (PORCINE) PER 1000 UNITS

## 2025-02-24 PROCEDURE — 93018 CV STRESS TEST I&R ONLY: CPT | Performed by: INTERNAL MEDICINE

## 2025-02-24 PROCEDURE — 80053 COMPREHEN METABOLIC PANEL: CPT

## 2025-02-24 PROCEDURE — 25010000002 REGADENOSON 0.4 MG/5ML SOLUTION: Performed by: INTERNAL MEDICINE

## 2025-02-24 PROCEDURE — 34310000005 TECHNETIUM SESTAMIBI: Performed by: INTERNAL MEDICINE

## 2025-02-24 PROCEDURE — 93005 ELECTROCARDIOGRAM TRACING: CPT

## 2025-02-24 PROCEDURE — 63710000001 INSULIN GLARGINE PER 5 UNITS

## 2025-02-24 RX ORDER — ACETAMINOPHEN 325 MG/1
650 TABLET ORAL EVERY 4 HOURS PRN
Status: DISCONTINUED | OUTPATIENT
Start: 2025-02-24 | End: 2025-02-24 | Stop reason: HOSPADM

## 2025-02-24 RX ORDER — CETIRIZINE HYDROCHLORIDE 10 MG/1
10 TABLET ORAL DAILY
Status: DISCONTINUED | OUTPATIENT
Start: 2025-02-24 | End: 2025-02-24 | Stop reason: HOSPADM

## 2025-02-24 RX ORDER — REGADENOSON 0.08 MG/ML
0.4 INJECTION, SOLUTION INTRAVENOUS
Status: COMPLETED | OUTPATIENT
Start: 2025-02-24 | End: 2025-02-24

## 2025-02-24 RX ORDER — BISOPROLOL FUMARATE 5 MG/1
2.5 TABLET, FILM COATED ORAL DAILY
Status: CANCELLED | OUTPATIENT
Start: 2025-02-24

## 2025-02-24 RX ORDER — SULFASALAZINE 500 MG/1
500 TABLET ORAL EVERY 12 HOURS SCHEDULED
Status: DISCONTINUED | OUTPATIENT
Start: 2025-02-24 | End: 2025-02-24 | Stop reason: HOSPADM

## 2025-02-24 RX ORDER — ACETAMINOPHEN 650 MG/1
650 SUPPOSITORY RECTAL EVERY 4 HOURS PRN
Status: DISCONTINUED | OUTPATIENT
Start: 2025-02-24 | End: 2025-02-24 | Stop reason: HOSPADM

## 2025-02-24 RX ORDER — LOSARTAN POTASSIUM 50 MG/1
50 TABLET ORAL
Status: DISCONTINUED | OUTPATIENT
Start: 2025-02-24 | End: 2025-02-24 | Stop reason: HOSPADM

## 2025-02-24 RX ORDER — DOXYCYCLINE 100 MG/1
100 CAPSULE ORAL 2 TIMES DAILY
COMMUNITY

## 2025-02-24 RX ORDER — UBIDECARENONE 100 MG
100 CAPSULE ORAL DAILY
COMMUNITY

## 2025-02-24 RX ORDER — BISOPROLOL FUMARATE 5 MG/1
2.5 TABLET, FILM COATED ORAL DAILY
Status: DISCONTINUED | OUTPATIENT
Start: 2025-02-24 | End: 2025-02-24 | Stop reason: HOSPADM

## 2025-02-24 RX ORDER — ACETAMINOPHEN 160 MG/5ML
650 SOLUTION ORAL EVERY 4 HOURS PRN
Status: DISCONTINUED | OUTPATIENT
Start: 2025-02-24 | End: 2025-02-24 | Stop reason: HOSPADM

## 2025-02-24 RX ORDER — DOCUSATE SODIUM 100 MG/1
100 CAPSULE, LIQUID FILLED ORAL 2 TIMES DAILY
Status: DISCONTINUED | OUTPATIENT
Start: 2025-02-24 | End: 2025-02-24 | Stop reason: HOSPADM

## 2025-02-24 RX ORDER — DIPHENOXYLATE HYDROCHLORIDE AND ATROPINE SULFATE 2.5; .025 MG/1; MG/1
1 TABLET ORAL DAILY
Status: DISCONTINUED | OUTPATIENT
Start: 2025-02-24 | End: 2025-02-24 | Stop reason: HOSPADM

## 2025-02-24 RX ORDER — SULFASALAZINE 500 MG/1
500 TABLET, DELAYED RELEASE ORAL 2 TIMES DAILY
COMMUNITY

## 2025-02-24 RX ORDER — DEXTROSE MONOHYDRATE 25 G/50ML
25 INJECTION, SOLUTION INTRAVENOUS
Status: DISCONTINUED | OUTPATIENT
Start: 2025-02-24 | End: 2025-02-24 | Stop reason: HOSPADM

## 2025-02-24 RX ORDER — ASPIRIN 81 MG/1
81 TABLET ORAL DAILY
Status: CANCELLED | OUTPATIENT
Start: 2025-02-24

## 2025-02-24 RX ORDER — BISACODYL 5 MG/1
5 TABLET, DELAYED RELEASE ORAL DAILY PRN
Status: DISCONTINUED | OUTPATIENT
Start: 2025-02-24 | End: 2025-02-24 | Stop reason: HOSPADM

## 2025-02-24 RX ORDER — DOXYCYCLINE 100 MG/1
100 CAPSULE ORAL EVERY 12 HOURS SCHEDULED
Status: CANCELLED | OUTPATIENT
Start: 2025-02-24 | End: 2025-02-28

## 2025-02-24 RX ORDER — INSULIN LISPRO 100 [IU]/ML
2-7 INJECTION, SOLUTION INTRAVENOUS; SUBCUTANEOUS
Status: DISCONTINUED | OUTPATIENT
Start: 2025-02-24 | End: 2025-02-24 | Stop reason: HOSPADM

## 2025-02-24 RX ORDER — POLYETHYLENE GLYCOL 3350 17 G/17G
17 POWDER, FOR SOLUTION ORAL DAILY PRN
Status: DISCONTINUED | OUTPATIENT
Start: 2025-02-24 | End: 2025-02-24 | Stop reason: HOSPADM

## 2025-02-24 RX ORDER — LEVOCETIRIZINE DIHYDROCHLORIDE 5 MG/1
5 TABLET, FILM COATED ORAL EVERY EVENING
COMMUNITY

## 2025-02-24 RX ORDER — FOLIC ACID 1 MG/1
1 TABLET ORAL DAILY
Status: DISCONTINUED | OUTPATIENT
Start: 2025-02-24 | End: 2025-02-24 | Stop reason: HOSPADM

## 2025-02-24 RX ORDER — GLUCAGON 1 MG/ML
1 KIT INJECTION
Status: DISCONTINUED | OUTPATIENT
Start: 2025-02-24 | End: 2025-02-24 | Stop reason: HOSPADM

## 2025-02-24 RX ORDER — HYDROCODONE BITARTRATE AND ACETAMINOPHEN 10; 325 MG/1; MG/1
0.5 TABLET ORAL EVERY 8 HOURS PRN
Status: DISCONTINUED | OUTPATIENT
Start: 2025-02-24 | End: 2025-02-24 | Stop reason: HOSPADM

## 2025-02-24 RX ORDER — AMOXICILLIN 250 MG
2 CAPSULE ORAL 2 TIMES DAILY PRN
Status: DISCONTINUED | OUTPATIENT
Start: 2025-02-24 | End: 2025-02-24 | Stop reason: HOSPADM

## 2025-02-24 RX ORDER — NICOTINE POLACRILEX 4 MG
15 LOZENGE BUCCAL
Status: DISCONTINUED | OUTPATIENT
Start: 2025-02-24 | End: 2025-02-24 | Stop reason: HOSPADM

## 2025-02-24 RX ORDER — ROSUVASTATIN CALCIUM 20 MG/1
20 TABLET, COATED ORAL DAILY
Status: CANCELLED | OUTPATIENT
Start: 2025-02-24

## 2025-02-24 RX ORDER — HEPARIN SODIUM 5000 [USP'U]/ML
5000 INJECTION, SOLUTION INTRAVENOUS; SUBCUTANEOUS EVERY 12 HOURS SCHEDULED
Status: DISCONTINUED | OUTPATIENT
Start: 2025-02-24 | End: 2025-02-24 | Stop reason: HOSPADM

## 2025-02-24 RX ORDER — SODIUM CHLORIDE 0.9 % (FLUSH) 0.9 %
10 SYRINGE (ML) INJECTION AS NEEDED
Status: DISCONTINUED | OUTPATIENT
Start: 2025-02-24 | End: 2025-02-24 | Stop reason: HOSPADM

## 2025-02-24 RX ORDER — ATORVASTATIN CALCIUM 40 MG/1
40 TABLET, FILM COATED ORAL NIGHTLY
Status: DISCONTINUED | OUTPATIENT
Start: 2025-02-24 | End: 2025-02-24

## 2025-02-24 RX ORDER — DOCUSATE SODIUM 250 MG
250 CAPSULE ORAL 2 TIMES DAILY
COMMUNITY

## 2025-02-24 RX ORDER — SODIUM CHLORIDE 0.9 % (FLUSH) 0.9 %
10 SYRINGE (ML) INJECTION EVERY 12 HOURS SCHEDULED
Status: DISCONTINUED | OUTPATIENT
Start: 2025-02-24 | End: 2025-02-24 | Stop reason: HOSPADM

## 2025-02-24 RX ORDER — ROSUVASTATIN CALCIUM 20 MG/1
20 TABLET, COATED ORAL DAILY
Status: DISCONTINUED | OUTPATIENT
Start: 2025-02-24 | End: 2025-02-24 | Stop reason: HOSPADM

## 2025-02-24 RX ORDER — BISACODYL 10 MG
10 SUPPOSITORY, RECTAL RECTAL DAILY PRN
Status: DISCONTINUED | OUTPATIENT
Start: 2025-02-24 | End: 2025-02-24 | Stop reason: HOSPADM

## 2025-02-24 RX ORDER — PANTOPRAZOLE SODIUM 40 MG/1
40 TABLET, DELAYED RELEASE ORAL
Status: DISCONTINUED | OUTPATIENT
Start: 2025-02-24 | End: 2025-02-24 | Stop reason: HOSPADM

## 2025-02-24 RX ORDER — INSULIN GLARGINE AND LIXISENATIDE 100; 33 U/ML; UG/ML
16 INJECTION, SOLUTION SUBCUTANEOUS DAILY
COMMUNITY

## 2025-02-24 RX ORDER — NITROGLYCERIN 0.4 MG/1
0.4 TABLET SUBLINGUAL
Status: CANCELLED | OUTPATIENT
Start: 2025-02-24

## 2025-02-24 RX ORDER — ASPIRIN 81 MG/1
81 TABLET ORAL DAILY
Status: DISCONTINUED | OUTPATIENT
Start: 2025-02-24 | End: 2025-02-24 | Stop reason: HOSPADM

## 2025-02-24 RX ORDER — NITROGLYCERIN 0.4 MG/1
0.4 TABLET SUBLINGUAL
Status: DISCONTINUED | OUTPATIENT
Start: 2025-02-24 | End: 2025-02-24 | Stop reason: HOSPADM

## 2025-02-24 RX ORDER — SODIUM CHLORIDE 9 MG/ML
40 INJECTION, SOLUTION INTRAVENOUS AS NEEDED
Status: DISCONTINUED | OUTPATIENT
Start: 2025-02-24 | End: 2025-02-24 | Stop reason: HOSPADM

## 2025-02-24 RX ADMIN — INSULIN LISPRO 3 UNITS: 100 INJECTION, SOLUTION INTRAVENOUS; SUBCUTANEOUS at 17:47

## 2025-02-24 RX ADMIN — HEPARIN SODIUM 5000 UNITS: 5000 INJECTION INTRAVENOUS; SUBCUTANEOUS at 01:35

## 2025-02-24 RX ADMIN — Medication 10 ML: at 11:01

## 2025-02-24 RX ADMIN — SULFASALAZINE 500 MG: 500 TABLET ORAL at 11:00

## 2025-02-24 RX ADMIN — DOCUSATE SODIUM 100 MG: 100 CAPSULE, LIQUID FILLED ORAL at 11:00

## 2025-02-24 RX ADMIN — PANTOPRAZOLE SODIUM 40 MG: 40 TABLET, DELAYED RELEASE ORAL at 11:00

## 2025-02-24 RX ADMIN — ROSUVASTATIN CALCIUM 20 MG: 20 TABLET, FILM COATED ORAL at 11:00

## 2025-02-24 RX ADMIN — INSULIN GLARGINE 10 UNITS: 100 INJECTION, SOLUTION SUBCUTANEOUS at 11:51

## 2025-02-24 RX ADMIN — MULTIVITAMIN TABLET 1 TABLET: TABLET at 11:00

## 2025-02-24 RX ADMIN — LOSARTAN POTASSIUM 50 MG: 50 TABLET, FILM COATED ORAL at 11:00

## 2025-02-24 RX ADMIN — ATORVASTATIN CALCIUM 40 MG: 40 TABLET, FILM COATED ORAL at 01:35

## 2025-02-24 RX ADMIN — Medication 10 MG: at 02:55

## 2025-02-24 RX ADMIN — Medication 10 ML: at 01:49

## 2025-02-24 RX ADMIN — ASPIRIN 81 MG: 81 TABLET, COATED ORAL at 11:00

## 2025-02-24 RX ADMIN — FOLIC ACID 1 MG: 1 TABLET ORAL at 11:00

## 2025-02-24 RX ADMIN — CETIRIZINE HYDROCHLORIDE 10 MG: 10 TABLET, FILM COATED ORAL at 11:00

## 2025-02-24 RX ADMIN — TECHNETIUM TC 99M SESTAMIBI 1 DOSE: 1 INJECTION INTRAVENOUS at 07:35

## 2025-02-24 RX ADMIN — TECHNETIUM TC 99M SESTAMIBI 1 DOSE: 1 INJECTION INTRAVENOUS at 09:41

## 2025-02-24 RX ADMIN — REGADENOSON 0.4 MG: 0.08 INJECTION, SOLUTION INTRAVENOUS at 09:41

## 2025-02-24 NOTE — ED PROVIDER NOTES
"Subjective   History of Present Illness  Patient is a 75-year-old male who presents today with complaints of hypertension x 2 days.  He reports that he noted his blood pressure was higher than his normal yesterday so he took an additional 20 mg olmesartan.  He reports during this episode he began to have a \"heaviness\" across his entire anterior chest.  He reports that this pain did go away however began again today.  He reports early this morning he began having another \"heaviness\" across his entire anterior chest and that it has not went away despite taking an additional olmesartan as well as nitro x 1.  He reports that he has several cardiac stents in the last time he had to have stents placed this is the pain he felt.  He denies any radiation of his pain to his neck or arm.  He denies any associated shortness of breath.  He denies any visual changes or head pain.  He is awake, alert, in no acute distress upon his arrival.  He denies any other complaints this date.  He presents private vehicle.        Review of Systems   Constitutional: Negative.  Negative for fever.   HENT: Negative.     Respiratory: Negative.  Negative for shortness of breath.    Cardiovascular:  Positive for chest pain.   Gastrointestinal: Negative.  Negative for abdominal pain.   Endocrine: Negative.    Genitourinary: Negative.  Negative for dysuria.   Skin: Negative.    Neurological: Negative.    Psychiatric/Behavioral: Negative.     All other systems reviewed and are negative.      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    Benign prostatic hyperplasia with urinary obstruction 02/23/2025    CAD (coronary artery disease)     Closed fracture of proximal end of left humerus 09/03/2016    Diabetic peripheral neuropathy associated with type 2 diabetes mellitus 08/27/2022    Dyslipidemia     Erectile dysfunction 01/05/2023    GERD (gastroesophageal reflux disease)     Gout     H/O degenerative disc disease     " Degenerative disc disease of the cervical and lumbar spine/chronic back pain    History of gluten intolerance     Hypertension     Iron deficiency anemia, unspecified 11/21/2019    Myocardial infarction     Osteoarthritis     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    Type 2 diabetes mellitus        Allergies   Allergen Reactions    Shrimp (Diagnostic) Other (See Comments)     Lip swelling and hives    Shrimp Extract Unknown - Low Severity    Thimerosal Unknown - Low Severity    Iodine Unknown - Low Severity       Past Surgical History:   Procedure Laterality Date    APPENDECTOMY      CARDIAC CATHETERIZATION      CARDIAC CATHETERIZATION Left 04/28/2021    Procedure: Left Heart Cath;  Surgeon: Grady Garcia MD;  Location:  [facility];  Service: Cardiology;  Laterality: Left;    CARDIAC CATHETERIZATION N/A 2/12/2024    Procedure: Left Heart Cath;  Surgeon: Grady Garcia MD;  Location:  [facility];  Service: Cardiology;  Laterality: N/A;    CORONARY ANGIOPLASTY WITH STENT PLACEMENT      CYSTOSCOPY TRANSURETHRAL RESECTION OF PROSTATE      HEMORRHOIDECTOMY      HUMERUS SURGERY Left 2016    LUMBAR FUSION      REPLACEMENT TOTAL HIP LATERAL POSITION Right 2018       Family History   Problem Relation Age of Onset    Arthritis Mother     Hypertension Mother     Arthritis Father     Cancer Father     Diabetes Sister     Cancer Son        Social History     Socioeconomic History    Marital status:    Tobacco Use    Smoking status: Never     Passive exposure: Past    Smokeless tobacco: Never   Vaping Use    Vaping status: Never Used   Substance and Sexual Activity    Alcohol use: Not Currently    Drug use: Never    Sexual activity: Defer           Objective   Physical Exam  Vitals and nursing note reviewed.   Constitutional:       General: He is not in acute distress.     Appearance: He is well-developed. He is not diaphoretic.   HENT:      Head: Normocephalic and atraumatic.      Right Ear:  External ear normal.      Left Ear: External ear normal.      Nose: Nose normal.   Eyes:      Conjunctiva/sclera: Conjunctivae normal.      Pupils: Pupils are equal, round, and reactive to light.   Neck:      Vascular: No JVD.      Trachea: No tracheal deviation.   Cardiovascular:      Rate and Rhythm: Regular rhythm. Bradycardia present.      Heart sounds: Normal heart sounds. No murmur heard.  Pulmonary:      Effort: Pulmonary effort is normal. No respiratory distress.      Breath sounds: Normal breath sounds. No wheezing.   Chest:      Chest wall: Tenderness present.   Abdominal:      General: Bowel sounds are normal.      Palpations: Abdomen is soft.      Tenderness: There is no abdominal tenderness.   Musculoskeletal:         General: No deformity. Normal range of motion.      Cervical back: Normal range of motion and neck supple.   Skin:     General: Skin is warm and dry.      Coloration: Skin is not pale.      Findings: No erythema or rash.   Neurological:      Mental Status: He is alert and oriented to person, place, and time.      Cranial Nerves: No cranial nerve deficit.   Psychiatric:         Behavior: Behavior normal.         Thought Content: Thought content normal.       Results for orders placed or performed during the hospital encounter of 02/23/25   ECG 12 Lead Chest Pain    Collection Time: 02/23/25  9:37 PM   Result Value Ref Range    QT Interval 454 ms    QTC Interval 422 ms   Comprehensive Metabolic Panel    Collection Time: 02/23/25 10:09 PM    Specimen: Blood   Result Value Ref Range    Glucose 160 (H) 65 - 99 mg/dL    BUN 15 8 - 23 mg/dL    Creatinine 0.95 0.76 - 1.27 mg/dL    Sodium 141 136 - 145 mmol/L    Potassium 3.8 3.5 - 5.2 mmol/L    Chloride 104 98 - 107 mmol/L    CO2 28.6 22.0 - 29.0 mmol/L    Calcium 8.6 8.6 - 10.5 mg/dL    Total Protein 6.0 6.0 - 8.5 g/dL    Albumin 3.8 3.5 - 5.2 g/dL    ALT (SGPT) 15 1 - 41 U/L    AST (SGOT) 20 1 - 40 U/L    Alkaline Phosphatase 86 39 - 117 U/L     Total Bilirubin 0.9 0.0 - 1.2 mg/dL    Globulin 2.2 gm/dL    A/G Ratio 1.7 g/dL    BUN/Creatinine Ratio 15.8 7.0 - 25.0    Anion Gap 8.4 5.0 - 15.0 mmol/L    eGFR 82.4 >60.0 mL/min/1.73   High Sensitivity Troponin T    Collection Time: 02/23/25 10:09 PM    Specimen: Blood   Result Value Ref Range    HS Troponin T 15 <22 ng/L   BNP    Collection Time: 02/23/25 10:09 PM    Specimen: Blood   Result Value Ref Range    proBNP 171.1 0.0 - 1,800.0 pg/mL   CBC Auto Differential    Collection Time: 02/23/25 10:09 PM    Specimen: Blood   Result Value Ref Range    WBC 6.06 3.40 - 10.80 10*3/mm3    RBC 4.36 4.14 - 5.80 10*6/mm3    Hemoglobin 13.4 13.0 - 17.7 g/dL    Hematocrit 40.9 37.5 - 51.0 %    MCV 93.8 79.0 - 97.0 fL    MCH 30.7 26.6 - 33.0 pg    MCHC 32.8 31.5 - 35.7 g/dL    RDW 13.5 12.3 - 15.4 %    RDW-SD 46.6 37.0 - 54.0 fl    MPV 11.2 6.0 - 12.0 fL    Platelets 147 140 - 450 10*3/mm3    Neutrophil % 58.1 42.7 - 76.0 %    Lymphocyte % 24.9 19.6 - 45.3 %    Monocyte % 12.0 5.0 - 12.0 %    Eosinophil % 4.1 0.3 - 6.2 %    Basophil % 0.7 0.0 - 1.5 %    Immature Grans % 0.2 0.0 - 0.5 %    Neutrophils, Absolute 3.52 1.70 - 7.00 10*3/mm3    Lymphocytes, Absolute 1.51 0.70 - 3.10 10*3/mm3    Monocytes, Absolute 0.73 0.10 - 0.90 10*3/mm3    Eosinophils, Absolute 0.25 0.00 - 0.40 10*3/mm3    Basophils, Absolute 0.04 0.00 - 0.20 10*3/mm3    Immature Grans, Absolute 0.01 0.00 - 0.05 10*3/mm3    nRBC 0.0 0.0 - 0.2 /100 WBC   Green Top (Gel)    Collection Time: 02/23/25 10:09 PM   Result Value Ref Range    Extra Tube Hold for add-ons.    Lavender Top    Collection Time: 02/23/25 10:09 PM   Result Value Ref Range    Extra Tube hold for add-on    Gold Top - SST    Collection Time: 02/23/25 10:09 PM   Result Value Ref Range    Extra Tube Hold for add-ons.    Light Blue Top    Collection Time: 02/23/25 10:09 PM   Result Value Ref Range    Extra Tube Hold for add-ons.    High Sensitivity Troponin T 1Hr    Collection Time: 02/23/25 11:16  "PM    Specimen: Blood   Result Value Ref Range    HS Troponin T 16 <22 ng/L    Troponin T Numeric Delta 1 Abnormal if >/=3 ng/L         Procedures           ED Course  ED Course as of 02/24/25 0024   Sun Feb 23, 2025   2347 Paged hospitalist for admission. [KH]      ED Course User Index  [KH] Savi Rose, ELSY                  HEART Score: 4                                      Medical Decision Making  Patient is a 75-year-old male who presents today with complaints of hypertension x 2 days.  He reports that he noted his blood pressure was higher than his normal yesterday so he took an additional 20 mg olmesartan.  He reports during this episode he began to have a \"heaviness\" across his entire anterior chest.  He reports that this pain did go away however began again today.  He reports early this morning he began having another \"heaviness\" across his entire anterior chest and that it has not went away despite taking an additional olmesartan as well as nitro x 1.  He reports that he has several cardiac stents in the last time he had to have stents placed this is the pain he felt.  He denies any radiation of his pain to his neck or arm.  He denies any associated shortness of breath.  He denies any visual changes or head pain.  He is awake, alert, in no acute distress upon his arrival.  He denies any other complaints this date.  He presents private vehicle.    Amount and/or Complexity of Data Reviewed  Labs: ordered.  Radiology: ordered.  ECG/medicine tests: ordered.    Risk  OTC drugs.  Prescription drug management.        Final diagnoses:   Chest pain, unspecified type       ED Disposition  ED Disposition       ED Disposition   Decision to Admit    Condition   --    Comment   Level of Care: Telemetry [5]   Diagnosis: Chest pain [[id number]]                 No follow-up provider specified.       Medication List      No changes were made to your prescriptions during this visit.            Savi Rose, " APRN  02/24/25 0024

## 2025-02-24 NOTE — PLAN OF CARE
Goal Outcome Evaluation: Patient [facility] to  [facility] this [facility]. Patient has been pleasant. Patient on RA, saturations remaining above 90%. No acute s/s of distress noted. No SOB reported. Patient resting in bed with call light in reach. Plan of care ongoing.

## 2025-02-24 NOTE — NURSING NOTE
Pt D/C'd per Dr. Aguayo. IV and Telemetry D/C'd. D/C education provided to pt at bedside with verbal understanding. Pt resting in bed, watching television. Pt has tolerated all interventions. No complaints/concerns. No acute distress noted. Call light within reach. Will provide care until pt off unit.

## 2025-02-24 NOTE — H&P
"  [facility] Medicine Services  History & Physical    Patient Identification:  Name:  Alfredito Marcus  Age:  75 y.o.  Sex:  male  :  1950  MRN:  [id number]   Visit Number:  [id number]  Admit Date: 2025   Primary Care Physician:  Pastora Kulkarni MD    Subjective     Chief complaint: Hypertension, Chest Pressure    History of presenting illness:      Alfredito Marcus is a 75 y.o. male with past medical history significant for CAD status post stenting, essential hypertension, dyslipidemia, history of iron deficiency anemia, BPH, history of gout, type 2 diabetes mellitus, GERD, degenerative disc disease, and osteoarthritis.  Patient presents to [facility] emergency department for further evaluation of hypertension x 2 days.  He states that he \"was not feeling right\" on the evening of 2025.  Reports some chest \"heaviness\".  He states that he checked his blood pressure at home and it was 160/100s.  Patient states that he took an extra 20 mg olmesartan which did help to improve his blood pressure and relieve his chest heaviness.  However, on the morning of 2025, patient states that symptoms returned.  He checked his blood pressure and it was again elevated.  He again took an extra 20 mg olmesartan.  However, this time, symptoms were not relieved.  He states that he has had chest pressure all day.  Initially, he rates chest pressure a 7-8 out of 10 on a numeric scale.  Currently he rates it a 5-6 out of 10 on a numeric scale.  He denies associated shortness of breath or palpitations.  He does report some dizziness at times.  He states that a couple of months ago he was placed on a beta-blocker by his primary cardiologist in [place] (Dr. Garcia).  He states that he takes a half of 5 mg bisoprolol.  Reports that since that time his heart rate has been averaging in the 50s.  Patient states that he has had 4 stents in the past.  He states that his last stent was placed " "in November 2020 at [facility].  Vital signs stable currently.  Patient is alert and oriented x 4.  No acute distress.  On room air.  Discussed admission plans with patient.  He voiced agreement and understanding with no further questions or concerns at this time.    Upon arrival to the ED, vital signs were temperature 97.1, pulse 52, respirations 18, blood pressure 144/65 sitting, SpO2 saturation 97% on room air.  High-sensitivity troponin T negative x 2.  BNP nonelevated.  CMP with glucose 160, otherwise unremarkable.  CBC unremarkable.  Chest x-ray unremarkable.    Known Emergency Department medications received prior to my evaluation included 324 mg aspirin. Room location at the time of my evaluation was 302B.  Discussed with admitting physician, Isela Marroquin MD.     ---------------------------------------------------------------------------------------------------------------------   Review of Systems   Constitutional:  Negative for chills and fever.   HENT:  Negative for congestion, sore throat and trouble swallowing.    Respiratory:  Negative for cough, shortness of breath and wheezing.    Cardiovascular:  Positive for chest pain (\"Pressure\"). Negative for palpitations and leg swelling.        + Elevated blood pressure   Gastrointestinal:  Negative for abdominal pain, constipation, diarrhea, nausea and vomiting.   Genitourinary:  Negative for difficulty urinating, flank pain, frequency and urgency.   Musculoskeletal:  Negative for gait problem, neck pain and neck stiffness.   Neurological:  Positive for dizziness (Occasional, mostly when lying down). Negative for tremors, seizures, syncope, facial asymmetry, speech difficulty, weakness, light-headedness, numbness and headaches.      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    " Benign prostatic hyperplasia with urinary obstruction 02/23/2025    CAD (coronary artery disease)     Closed fracture of proximal end of left humerus 09/03/2016    Diabetic peripheral neuropathy associated with type 2 diabetes mellitus 08/27/2022    Dyslipidemia     Erectile dysfunction 01/05/2023    GERD (gastroesophageal reflux disease)     Gout     H/O degenerative disc disease     Degenerative disc disease of the cervical and lumbar spine/chronic back pain    History of gluten intolerance     Hypertension     Iron deficiency anemia, unspecified 11/21/2019    Myocardial infarction     Osteoarthritis     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    Type 2 diabetes mellitus      Past Surgical History:   Procedure Laterality Date    APPENDECTOMY      CARDIAC CATHETERIZATION      CARDIAC CATHETERIZATION Left 04/28/2021    Procedure: Left Heart Cath;  Surgeon: Grady Garcia MD;  Location:  [facility];  Service: Cardiology;  Laterality: Left;    CARDIAC CATHETERIZATION N/A 2/12/2024    Procedure: Left Heart Cath;  Surgeon: Grady Garcia MD;  Location:  [facility];  Service: Cardiology;  Laterality: N/A;    CORONARY ANGIOPLASTY WITH STENT PLACEMENT      CYSTOSCOPY TRANSURETHRAL RESECTION OF PROSTATE      HEMORRHOIDECTOMY      HUMERUS SURGERY Left 2016    LUMBAR FUSION      REPLACEMENT TOTAL HIP LATERAL POSITION Right 2018     Family History   Problem Relation Age of Onset    Arthritis Mother     Hypertension Mother     Arthritis Father     Cancer Father     Diabetes Sister     Cancer Son      Social History     Socioeconomic History    Marital status:    Tobacco Use    Smoking status: Never     Passive exposure: Past    Smokeless tobacco: Never   Vaping Use    Vaping status: Never Used   Substance and Sexual Activity    Alcohol use: Not Currently    Drug use: Never    Sexual activity: Defer      ---------------------------------------------------------------------------------------------------------------------   Allergies:  Shrimp (diagnostic), Shrimp extract, Thimerosal, and Iodine  ---------------------------------------------------------------------------------------------------------------------   Home medications:    Medications below are reported home medications pulling from within the system; at this time, these medications have not been reconciled unless otherwise specified and are in the verification process for further verifcation as current home medications.  Facility-Administered Medications Prior to Admission   Medication Dose Route Frequency Provider Last Rate Last Admin    bupivacaine (MARCAINE) 0.5 % injection 5 mL  5 mL Injection Once Marky López APRN         Medications Prior to Admission   Medication Sig Dispense Refill Last Dose/Taking    aspirin 81 MG EC tablet Take 1 tablet by mouth Daily.       bisoprolol (ZEBeta) 5 MG tablet Take 0.5 tablets by mouth Daily. 30 tablet 5     calcium carbonate (OS-HIRAM) 600 MG tablet Take 1 tablet by mouth Daily.       dapagliflozin Propanediol (Farxiga) 10 MG tablet Take 10 mg by mouth Daily.       diclofenac (VOLTAREN) 50 MG EC tablet        docusate sodium (COLACE) 250 MG capsule Take 1 capsule by mouth 2 (Two) Times a Day As Needed for Constipation. 60 capsule 1     folic acid (FOLVITE) 1 MG tablet Take 1 tablet by mouth Daily.       HYDROcodone-acetaminophen (NORCO)  MG per tablet TAKE 1/2 TABLET BY MOUTH THREE TIMES DAILY AS NEEDED FOR PAIN       hydrOXYzine (ATARAX) 50 MG tablet Take 1 tablet by mouth Daily.       JANUVIA 100 MG tablet Take 1 tablet by mouth Daily.       Jardiance 25 MG tablet tablet Take 1 tablet by mouth Every Morning.       meclizine (ANTIVERT) 25 MG tablet Take 1 tablet by mouth 3 (Three) Times a Day As Needed for Dizziness. 20 tablet 0     multivitamin tablet tablet Take 1 tablet by mouth Daily.        naproxen sodium (ALEVE) 220 MG tablet Take 1 tablet by mouth 2 (Two) Times a Day As Needed for Mild Pain.       nitroglycerin (NITROSTAT) 0.4 MG SL tablet 1 under the tongue as needed for angina, may repeat q5mins for up three doses 100 tablet 1     NovoLOG FlexPen 100 UNIT/ML solution pen-injector sc pen INJECT AS DIRECTED THREE TIMES DAILY PER SLIDING SCALE. MAX OF 30 UNITS PER DAY       olmesartan (BENICAR) 20 MG tablet Take 1 tablet by mouth Daily.       Omega-3-Acid Eth Est, Dietary, 1 G capsule Take 1 capsule by mouth 4 (Four) Times a Day.       pantoprazole (PROTONIX) 40 MG EC tablet TAKE 1 TABLET DAILY.       DISCONTINUE PREVACID       polyethylene glycol (MIRALAX) 17 g packet Take 17 g by mouth Daily. 30 each 0     repaglinide (PRANDIN) 1 MG tablet Take 1 tablet by mouth Daily.       rosuvastatin (CRESTOR) 20 MG tablet Take 1 tablet by mouth Daily.       tiZANidine (ZANAFLEX) 2 MG tablet Take 1 tablet by mouth every night at bedtime.       traMADol (ULTRAM) 50 MG tablet Take 1 tablet by mouth Every 8 (Eight) Hours As Needed.          Hospital Scheduled Meds:  aspirin, 81 mg, Oral, Daily  atorvastatin, 40 mg, Oral, Nightly  heparin (porcine), 5,000 Units, Subcutaneous, Q12H  sodium chloride, 10 mL, Intravenous, Q12H           Current listed hospital scheduled medications may not yet reflect those currently placed in orders that are signed and held awaiting patient's arrival to floor.   ---------------------------------------------------------------------------------------------------------------------     Objective     Vital Signs:  Temp:  [97.1 °F (36.2 °C)-97.8 °F (36.6 °C)] 97.8 °F (36.6 °C)  Heart Rate:  [43-53] 53  Resp:  [18] 18  BP: (135-171)/(65-84) 158/78      02/23/25  2135 02/24/25  0102   Weight: 74.8 kg (165 lb) 78.1 kg (172 lb 3.2 oz)     Body mass index is 24.71 kg/m².  ---------------------------------------------------------------------------------------------------------------------        Physical Exam  Vitals reviewed.   Constitutional:       General: He is awake. He is not in acute distress.     Appearance: He is not ill-appearing or diaphoretic.   HENT:      Head: Normocephalic and atraumatic.      Mouth/Throat:      Mouth: Mucous membranes are moist.      Pharynx: Oropharynx is clear.   Eyes:      Extraocular Movements: Extraocular movements intact.      Pupils: Pupils are equal, round, and reactive to light.   Cardiovascular:      Rate and Rhythm: Regular rhythm. Bradycardia present.      Pulses: Normal pulses.           Dorsalis pedis pulses are 2+ on the right side and 2+ on the left side.      Heart sounds: Normal heart sounds. No murmur heard.     No friction rub.   Pulmonary:      Effort: Pulmonary effort is normal. No accessory muscle usage, respiratory distress or retractions.      Breath sounds: No wheezing, rhonchi or rales.   Abdominal:      General: Bowel sounds are normal. There is no distension.      Palpations: Abdomen is soft.      Tenderness: There is no abdominal tenderness. There is no guarding.   Musculoskeletal:      Cervical back: Neck supple. No rigidity.      Right lower leg: No edema.      Left lower leg: No edema.   Skin:     General: Skin is warm and dry.      Capillary Refill: Capillary refill takes 2 to 3 seconds.   Neurological:      Mental Status: He is alert and oriented to person, place, and time. Mental status is at baseline.      Cranial Nerves: No dysarthria or facial asymmetry.      Sensory: Sensation is intact.      Motor: No weakness or tremor.   Psychiatric:         Attention and Perception: Attention normal.         Mood and Affect: Mood normal.         Speech: Speech normal.         Behavior: Behavior normal. Behavior is cooperative.         Thought Content: Thought content normal.         Cognition and Memory: Cognition normal.         Judgment: Judgment normal.  "      ---------------------------------------------------------------------------------------------------------------------  EKG: Pending formal cardiology interpretation.  Per my review, appears sinus bradycardia in the 50s.  No acute ST elevations or depressions.    Telemetry: Appearing sinus bradycardia in the 50s during my evaluation.  I have personally looked at both the EKG and the telemetry strips.  ---------------------------------------------------------------------------------------------------------------------   Results from last 7 days   Lab Units 02/23/25 2209   WBC 10*3/mm3 6.06   HEMOGLOBIN g/dL 13.4   HEMATOCRIT % 40.9   MCV fL 93.8   MCHC g/dL 32.8   PLATELETS 10*3/mm3 147         Results from last 7 days   Lab Units 02/23/25 2209   SODIUM mmol/L 141   POTASSIUM mmol/L 3.8   CHLORIDE mmol/L 104   CO2 mmol/L 28.6   BUN mg/dL 15   CREATININE mg/dL 0.95   CALCIUM mg/dL 8.6   GLUCOSE mg/dL 160*   ALBUMIN g/dL 3.8   BILIRUBIN mg/dL 0.9   ALK PHOS U/L 86   AST (SGOT) U/L 20   ALT (SGPT) U/L 15   Estimated Creatinine Clearance: 71.9 mL/min (by C-G formula based on SCr of 0.95 mg/dL).  No results found for: \"AMMONIA\"  Results from last 7 days   Lab Units 02/23/25 2316 02/23/25 2209   HSTROP T ng/L 16 15     Results from last 7 days   Lab Units 02/23/25 2209   PROBNP pg/mL 171.1     Lab Results   Component Value Date    HGBA1C 6.4 (H) 05/30/2016     Lab Results   Component Value Date    TSH 4.600 (H) 02/23/2025     No results found for: \"PREGTESTUR\", \"PREGSERUM\", \"HCG\", \"HCGQUANT\"  Pain Management Panel           No data to display                  ---------------------------------------------------------------------------------------------------------------------  Imaging Results (Last 7 Days)       Procedure Component Value Units Date/Time    XR Chest 1 View [[id number]] Collected: 02/24/25 0028     Updated: 02/24/25 0032    Narrative:      PROCEDURE: Chest x-ray examination performed on February 23, " 2025.  Single view.     HISTORY: Chest pain.     COMPARISON: None.     FINDINGS:     Normal heart size.  No lobar consolidation or edema.  No pleural effusion or pneumothorax.  No fracture or dislocation.  Fixation plate at the mid left humerus.  Osteoarthritis at the glenohumeral joint.  No lytic or blastic lesion.  No free air.       Impression:         Normal heart size.  No lobar consolidation or edema.   No pleural effusion or pneumothorax.  No fracture or foreign body.  Moderate osteoarthritis at the glenohumeral joints.     This report was finalized on 2/24/2025 12:29 AM by Burak Elkins MD.               Last echocardiogram:  Results for orders placed during the hospital encounter of 05/31/22    Adult Transthoracic Echo Complete W/ Cont if Necessary Per Protocol    Interpretation Summary  · Normal left ventricular cavity size and wall thickness noted. All left ventricular wall segments contract normally  · Left ventricular ejection fraction appears to be 61 - 65%.  · Left ventricular diastolic function is consistent with (grade I) impaired relaxation.  · The aortic valve is structurally normal with no regurgitation or stenosis present.  · The mitral valve is structurally normal with no significant stenosis present. Trace mitral valve regurgitation is present.  · There is no evidence of pericardial effusion.          I have personally reviewed the above radiology images and read the final radiology report on 02/24/25  ---------------------------------------------------------------------------------------------------------------------  Assessment / Plan     Active Hospital Problems    Diagnosis  POA    Chest pain [R07.9]  Yes       ASSESSMENT/PLAN:  #Chest pain (POA) -- R/O ACS   #Sinus bradycardia (POA)  #CAD status post stenting  #Essential hypertension  #Dyslipidemia  #History of iron deficiency anemia  #BPH  #History of gout  #Type 2 diabetes mellitus  #GERD  #Degenerative disc  disease  #Osteoarthritis  --High-sensitivity troponin T negative x 2. EKG without evidence of acute ischemia, although revealed a sinus bradycardia in the 50s.  --BP slightly elevated throughout ED course with systolic mostly 140s to 160s.  No significant elevations.  --Chest x-ray unremarkable.  --Continuous cardiac monitoring ordered.  --Obtain serial EKGs to monitor for changes.  --Repeat troponin in the AM.  --Obtain transthoracic echocardiogram.  --Stress testing ordered.  --+/- Cardiology consult pending results of echo/stress.   --N.p.o. after midnight.  --Obtain fasting lipid panel in the AM.  --Received 324 mg aspirin in the ED; continue 81 mg aspirin daily along with statin.  --Review home antihypertensive regimen once reconciled by pharmacy with plans to continue.  Hold any medication which may be contributing to bradycardia/dizziness.  --Continue to monitor vital signs per hospital policy.  --Accu-Cheks before meals and at bedtime with low-dose sliding scale insulin.  Obtain hemoglobin A1c to evaluate glycemic control.  Hypoglycemia protocol in place if necessary.  --Repeat labs in the a.m. (CBC and CMP).    ----------  -DVT prophylaxis: SubQ Heparin.   -Activity: As tolerated.   -Expected length of stay:   OBSERVATION status; however, if further evaluation or treatment plans warrant, status may change.  Based upon current information, I predict patient's care encounter to be less than or equal to 2 midnights   -Disposition plans to return home on discharge once medically stable.     High risk secondary to chest pain with multiple risk factors for ACS, sinus bradycardia.     CODE STATUS: FULL CODE, discussed with patient at bedside.       ELSY Rowland   02/24/25  01:27 EST  Pager #[phone]  ---------------------------------------------------------------------------------------------------------------------

## 2025-02-24 NOTE — CONSULTS
[facility]   Cardiology Consult Note    Patient Name: Alfredito Marcus  : 1950  MRN: [id number]  Primary Care Physician:  Pastora Kulkarni MD  Referring Physician: No ref. provider found    Date of admission: 2025    Subjective   Subjective     Reason for Consultation : Abnormal stress test     Chief Complaint : Chest pain     HPI:  Alfredito Marcus is a 75 y.o. male with history of coronary artery disease status post PCI to the LAD, hypertension, hyperlipidemia, and GERD who presented to the hospital with chest discomfort and high blood pressure.  He notices blood pressures are in the 160s and were not coming down so he presented to the ER.  He did have some chest discomfort but said that its alleviated by going to the bathroom and feels that it may be more gas-like.  He does not have any diaphoresis, associated nausea, or vomiting.  When he last needed a stent, he said he could not walk across the room without being severely short of breath with chest discomfort.  He does not have the same symptoms this time.  Troponins were negative x 3.  Stress test showed apical perfusion abnormality so interventional cardiology was consulted.  Patient denies any current chest pain, shortness of breath, PND, orthopnea.  Of note, he had a heart catheterization in 2024 which showed at most 30 to 40% stenosis in the RCA.  His previous stents were patent.    Review of Systems   All systems were reviewed and negative except for: chest pain    Personal History     Past Medical History:   Diagnosis Date    Angiokeratoma of scrotum 2023    Benign prostatic hyperplasia with urinary obstruction 2025    CAD (coronary artery disease)     Closed fracture of proximal end of left humerus 2016    Diabetic peripheral neuropathy associated with type 2 diabetes mellitus 2022    Dyslipidemia     Erectile dysfunction 2023    GERD (gastroesophageal reflux disease)     Gout     H/O  degenerative disc disease     Degenerative disc disease of the cervical and lumbar spine/chronic back pain    History of gluten intolerance     Hypertension     Iron deficiency anemia, unspecified 11/21/2019    Myocardial infarction     Osteoarthritis     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    Type 2 diabetes mellitus             Family History: family history includes Arthritis in his father and mother; Cancer in his father and son; Diabetes in his sister; Hypertension in his mother. Otherwise pertinent FHx was reviewed and not pertinent to current issue.    Social History:  reports that he has never smoked. He has been exposed to tobacco smoke. He has never used smokeless tobacco. He reports that he does not currently use alcohol. He reports that he does not use drugs.    Home Medications:  HYDROcodone-acetaminophen, Insulin Glargine-Lixisenatide, Omega-3-Acid Eth Est (Dietary), aspirin, bisoprolol, coenzyme Q10, docusate sodium, doxycycline, folic acid, levocetirizine, multivitamin, nitroglycerin, olmesartan, pantoprazole, rosuvastatin, and sulfaSALAzine    Allergies:  Allergies   Allergen Reactions    Shrimp (Diagnostic) Other (See Comments)     Lip swelling and hives    Shrimp Extract Unknown - Low Severity    Thimerosal Unknown - Low Severity    Iodine Unknown - Low Severity       Objective    Objective     Vitals:   Temp:  [97.1 °F (36.2 °C)-98.1 °F (36.7 °C)] 97.6 °F (36.4 °C)  Heart Rate:  [43-53] 48  Resp:  [18] 18  BP: (105-171)/(52-84) 145/70      Physical Exam:   Constitutional: Awake, alert, No acute distress    Eyes: PERRLA, sclerae anicteric, no conjunctival injection   HENT: NCAT, mucous membranes moist   Neck: Supple, no thyromegaly, no lymphadenopathy, trachea midline   Respiratory: Clear to auscultation bilaterally, nonlabored respirations    Cardiovascular: RRR, no murmurs, rubs, or gallops, palpable pedal pulses bilaterally   Gastrointestinal: Positive bowel sounds, soft, nontender,  nondistended   Musculoskeletal: No bilateral ankle edema, no clubbing or cyanosis to extremities   Psychiatric: Appropriate affect, cooperative   Neurologic: Oriented x 3, strength symmetric in all extremities, Cranial Nerves grossly intact to confrontation, speech clear   Skin: No rashes     Result Review    Result Review:  I have personally reviewed the results from the time of this admission to 2/24/2025 16:21 EST and agree with these findings:  [x]  Laboratory  []  Microbiology  [x]  Radiology  [x]  EKG/Telemetry   [x]  Cardiology/Vascular   []  Pathology  [x]  Old records  []  Other:  Most notable findings include:     CMP          2/23/2025    22:09 2/24/2025    03:00   CMP   Glucose 160  100    BUN 15  14    Creatinine 0.95  0.94    EGFR 82.4  83.5    Sodium 141  140    Potassium 3.8  3.9    Chloride 104  104    Calcium 8.6  9.1    Total Protein 6.0  6.3    Albumin 3.8  4.0    Globulin 2.2  2.3    Total Bilirubin 0.9  0.7    Alkaline Phosphatase 86  90    AST (SGOT) 20  24    ALT (SGPT) 15  16    Albumin/Globulin Ratio 1.7  1.7    BUN/Creatinine Ratio 15.8  14.9    Anion Gap 8.4  7.1       CBC          2/23/2025    22:09 2/24/2025    03:00   CBC   WBC 6.06  7.68    RBC 4.36  4.73    Hemoglobin 13.4  14.4    Hematocrit 40.9  43.9    MCV 93.8  92.8    MCH 30.7  30.4    MCHC 32.8  32.8    RDW 13.5  13.7    Platelets 147  148       Lab Results   Component Value Date    TROPONINT 17 02/24/2025         Assessment & Plan   Assessment / Plan     Brief Patient Summary:  Alfredito Marcus is a 75 y.o. male who presented to the hospital with chest pain and found to have an abnormal stress test    Active Hospital Problems:  Active Hospital Problems    Diagnosis     Chest pain    Coronary artery disease: Patient has had multiple stents to his LAD previously.  His symptoms were noncardiac during this admission with relief coming during use of the bathroom.  His troponins have been negative.  While a stress test was mildly  positive, the apical area of ischemia does not match where he had mild coronary disease seen previously.  With stents being patent a year ago and his symptoms being noncardiac, I do not think we should pursue invasive angiography at this time.  He has not had any further symptoms since being admitted.  He was noted to have bradycardia after being put on a beta-blocker recently  -Recommend medical management with guideline directed medical therapy.  -No further ischemic evaluation at this time  -Outpatient follow-up with patient's primary cardiologist  -Hold beta-blocker if heart rates continue to be less than 50.  -Agree with current HTN management. Blood pressures this morning were 120s-130s systolic    Thank you for the consult.  We will sign off.  Please call if you have any further questions    Electronically signed by Scout Cota MD, 02/24/25, 4:21 PM EST.

## 2025-02-24 NOTE — DISCHARGE SUMMARY
"    [facility] HOSPITALIST MEDICINE DISCHARGE SUMMARY    Patient Identification:  Name:  Alfredito Marcus  Age:  75 y.o.  Sex:  male  :  1950  MRN:  [id number]  Visit Number:  [id number]    Date of Admission: 2025  Date of Discharge:  2025    PCP: Pastora Kulkarni MD    DISCHARGE DIAGNOSIS   Chest Pain  Sinus Bradycardia  Essential HTN  HLD  Type II DM  GERD      CONSULTS  Dr. Cota, Interventional Cardiology      PROCEDURES PERFORMED   None      HOSPITAL COURSE  Mr. Marcus is a 75 y.o. male who presented to [facility] ED on 2025 with a chief complaint of chest pressure.  Patient has a past medical history remarkable for CAD, essential hypertension, hyperlipidemia, iron deficiency anemia, type 2 diabetes mellitus, GERD and osteoarthritis.  Patient reports he was \"not feeling right\" on the evening of 2025.  He reported chest heaviness and checked his blood pressure at home and found it to be elevated at 160/100s.  Patient took an extra 20 mg olmesartan and which did improve his blood pressure and relieve his chest heaviness/tightness.  Patient states his symptoms returned the following day and he found his blood pressure elevated once again.  Patient then reports persistent chest pain throughout the remainder of that day.  Patient denied any shortness of breath or palpitations.  For this reason, patient did present to the emergency department for further treatment and evaluation.  Initial evaluation emergency department did consist of basic laboratory work as well as physical exam and vital signs.  Initial vital signs found patient's blood pressure 144/65, respiratory rate 18, heart rate 52, temperature 97.1 °F with oxygen saturation 97% on room air.  Please see initial H&P for specific details.  Patient was diagnosed with chest pain and admitted to our telemetry unit for further treatment and evaluation.    High sensitive troponin T was negative x 2 and EKG " demonstrated no evidence of acute ischemia.  Patient did have intermittent episodes of sinus bradycardia.  Patient did have stress test and transthoracic echo obtained.  Transthoracic echo demonstrated normal left ventricular systolic function with estimated EF 63% with grade 2 diastolic dysfunction.  Stress test was obtained on 2/24/2025 which demonstrated abnormal myocardial perfusion study with a small sized mildly severe area of ischemia located in the apex.  As such, interventional cardiology services were consulted.  After thorough evaluation by interventional cardiology, it was felt patient's symptoms were noncardiac in nature and area of ischemia on stress test did not match with known history of mild coronary artery disease.  As such, no recommendation was made to pursue invasive angiography.  Patient was recommended to follow-up with his primary cardiologist within the next 1 month.  In regards to intermittent episodes of bradycardia, patient was instructed to hold his bisoprolol until he is reevaluated by cardiology services.  With this in mind, it is felt patient has reached maximal medical benefit of current hospitalization and he will be discharged home in stable condition today.  The beforementioned plan was thoroughly discussed with the patient and he expressed his understanding and willingness to proceed with the beforementioned plan.    VITAL SIGNS:      02/23/25  2135 02/24/25  0102 02/24/25  0500   Weight: 74.8 kg (165 lb) 78.1 kg (172 lb 3.2 oz) 78.1 kg (172 lb 2.9 oz)     Body mass index is 24.71 kg/m².    PHYSICAL EXAM:  General -well-nourished  male appearing stated age in no apparent distress  HEENT -head normocephalic and atraumatic, pupils equally round and reactive to light  Lungs -clear to auscultation bilaterally with no wheezes, rales or rhonchi appreciated  Cardiovascular -regular rate and rhythm with no murmurs, rubs or clicks appreciated  GI -abdomen soft, nontender  nondistended  Neurological -cranial nerves II through XII intact with no focal deficit or unintentional motor movement appreciated    DISCHARGE DISPOSITION   Stable    DISCHARGE MEDICATIONS:     Discharge Medications        Continue These Medications        Instructions Start Date   aspirin 81 MG EC tablet   81 mg, Oral, Daily      coenzyme Q10 100 MG capsule   100 mg, Daily      docusate sodium 250 MG capsule  Commonly known as: COLACE   250 mg, Oral, 2 Times Daily      doxycycline 100 MG capsule  Commonly known as: VIBRAMYCIN   100 mg, Oral, 2 Times Daily      folic acid 1 MG tablet  Commonly known as: FOLVITE   1 mg, Daily      HYDROcodone-acetaminophen  MG per tablet  Commonly known as: NORCO   TAKE 1/2 TABLET BY MOUTH THREE TIMES DAILY AS NEEDED FOR PAIN      levocetirizine 5 MG tablet  Commonly known as: XYZAL   5 mg, Oral, Every Evening      multivitamin tablet tablet   1 tablet, Daily      nitroglycerin 0.4 MG SL tablet  Commonly known as: NITROSTAT   1 under the tongue as needed for angina, may repeat q5mins for up three doses      olmesartan 20 MG tablet  Commonly known as: BENICAR   20 mg, Daily      Omega-3-Acid Eth Est (Dietary) 1 g capsule   1 capsule, 2 Times Daily      pantoprazole 40 MG EC tablet  Commonly known as: PROTONIX   TAKE 1 TABLET DAILY.       DISCONTINUE PREVACID      rosuvastatin 20 MG tablet  Commonly known as: CRESTOR   20 mg, Daily      Soliqua 100-33 UNT-MCG/ML solution pen-injector injection  Generic drug: Insulin Glargine-Lixisenatide   16 Units, Subcutaneous, Daily      sulfaSALAzine 500 MG EC tablet  Commonly known as: AZULFIDINE ENTABS   500 mg, 2 Times Daily             Stop These Medications      bisoprolol 5 MG tablet  Commonly known as: ZEBeta                Your Scheduled Appointments      Jan 20, 2026 10:15 AM  Follow Up with Grady Garcia MD  [facility] CARDIOLOGY ([place]) [address]  [facility] [address]  [phone]   Arrive 15  minutes prior to appointment.                Additional Instructions for the Follow-ups that You Need to Schedule       Discharge Follow-up with PCP   As directed       Currently Documented PCP:    Pastora Kulkarni MD    PCP Phone Number:    [phone]     Follow Up Details: please follow up with pcp in 1 week               Follow-up Information       Pastora Kulkarni MD .    Specialty: Geriatric Medicine  Why: please follow up with pcp in 1 week  Contact information:  [place] [address]  [phone]                             TEST  RESULTS PENDING AT DISCHARGE       Wesley Aguayo DO  02/24/25  17:36 EST    Please note that this discharge summary required more than 30 minutes to complete.    Please send a copy of this dictation to the following providers:  Pastora Kulkarni MD

## 2025-02-24 NOTE — PLAN OF CARE
Goal Outcome Evaluation: Patient is being discharged home today.

## 2025-02-25 ENCOUNTER — TELEPHONE (OUTPATIENT)
Dept: TELEMETRY | Facility: HOSPITAL | Age: 75
End: 2025-02-25
Payer: MEDICARE

## 2025-02-26 ENCOUNTER — TELEPHONE (OUTPATIENT)
Dept: CARDIOLOGY | Facility: CLINIC | Age: 75
End: 2025-02-26
Payer: MEDICARE

## 2025-02-26 NOTE — TELEPHONE ENCOUNTER
Caller: Alfredito Marcus    Relationship: Self    Best call back number: [phone]     What is the best time to reach you: ANYTIME      Who are you requesting to speak with (clinical staff, provider,  specific staff member): NURSE      What was the call regarding:  PT REPORTS HE HAS SOB ON EXERTION, WEAKNESS, FATIGUE. THIS HAS BEEN GOING ON FOR A COUPLE OF DAYS. PT WENT TO THE ER 02/23/25 , THEN HAD A STRESS TEST, ECHO, AND HEART X RAY ON MONDAY. ([facility] )     PT WANTS TO KNOW IF  CAN READ OVER THE RESULTS OF THIESE TESTS DUE TO HIM STILL HAVING THESE SYMPTOMS. PT WANTS TO SEE WHAT DR. PLATT SUGGESTS. PT IS NOT ACTIVELY HAVING THESE SYMPTOMS, NO CHEST PAIN REPORTED.         Is it okay if the provider responds through MyChart:  PLEASE CALL PT, THANK YOU

## 2025-02-27 NOTE — TELEPHONE ENCOUNTER
Caller: Alfredito Marcus    Relationship: Self    Best call back number: [phone]     What is the best time to reach you: ANY    Who are you requesting to speak with (clinical staff, provider,  specific staff member): CLINICAL    Do you know the name of the person who called: PLEASE CALL    What was the call regarding: PATIENT CALLED BACK IN REGARDS TO HIS PHONE CALL FROM 2-26-25 AND ADVISES HE HAS NOT HEARD FROM ANYONE. PLEASE CONTACT PATIENT AT YOUR EARLIEST CONVENIENCE.    Is it okay if the provider responds through [employer]: PLEASE CALL

## 2025-02-27 NOTE — TELEPHONE ENCOUNTER
Attempted call to both cell and home numbers without answer. Left VM asking for pt to return my call.

## 2025-02-28 ENCOUNTER — TELEPHONE (OUTPATIENT)
Dept: CARDIOLOGY | Facility: CLINIC | Age: 75
End: 2025-02-28
Payer: MEDICARE

## 2025-02-28 NOTE — TELEPHONE ENCOUNTER
Returned pt VM message concerning recent ER visit 2/23/25. Pt states he was having a little dyspnea on exertion, but mostly weakness and fatigue. Went to the ER at [facility] due to /111 and kept overnight d/t hypertension. Had ECHO, stress test, and chest xray completed. Stated cardiologist there felt results from stress test did not warrant a heart cath. Also reports HR 30s-40s in ER. Advised to stop taking bisoprolol and HR now 50s-60s. BP this morning 150/80. Pt denies CP, but c/o burning in chest he believes from GERD. Pt going to see his PCP right now to f/u and advised to please call if they make any changes or further recommendations. He verbalized understanding and no further questions at this time.

## 2025-03-03 ENCOUNTER — HOSPITAL ENCOUNTER (EMERGENCY)
Facility: HOSPITAL | Age: 75
Discharge: LEFT BEFORE DISCHARGE | End: 2025-03-03
Payer: MEDICARE

## 2025-03-03 VITALS
HEIGHT: 70 IN | RESPIRATION RATE: 18 BRPM | TEMPERATURE: 97.9 F | WEIGHT: 163 LBS | OXYGEN SATURATION: 98 % | BODY MASS INDEX: 23.34 KG/M2 | SYSTOLIC BLOOD PRESSURE: 145 MMHG | HEART RATE: 76 BPM | DIASTOLIC BLOOD PRESSURE: 78 MMHG

## 2025-03-03 LAB
ALBUMIN SERPL-MCNC: 4.6 G/DL (ref 3.5–5.2)
ALBUMIN/GLOB SERPL: 1.8 G/DL
ALP SERPL-CCNC: 100 U/L (ref 39–117)
ALT SERPL W P-5'-P-CCNC: 17 U/L (ref 1–41)
ANION GAP SERPL CALCULATED.3IONS-SCNC: 8.1 MMOL/L (ref 5–15)
AST SERPL-CCNC: 24 U/L (ref 1–40)
BASOPHILS # BLD AUTO: 0.04 10*3/MM3 (ref 0–0.2)
BASOPHILS NFR BLD AUTO: 0.7 % (ref 0–1.5)
BILIRUB SERPL-MCNC: 1 MG/DL (ref 0–1.2)
BUN SERPL-MCNC: 12 MG/DL (ref 8–23)
BUN/CREAT SERPL: 12 (ref 7–25)
CALCIUM SPEC-SCNC: 9 MG/DL (ref 8.6–10.5)
CHLORIDE SERPL-SCNC: 100 MMOL/L (ref 98–107)
CO2 SERPL-SCNC: 29.9 MMOL/L (ref 22–29)
CREAT SERPL-MCNC: 1 MG/DL (ref 0.76–1.27)
DEPRECATED RDW RBC AUTO: 46.3 FL (ref 37–54)
EGFRCR SERPLBLD CKD-EPI 2021: 77.5 ML/MIN/1.73
EOSINOPHIL # BLD AUTO: 0.31 10*3/MM3 (ref 0–0.4)
EOSINOPHIL NFR BLD AUTO: 5.6 % (ref 0.3–6.2)
ERYTHROCYTE [DISTWIDTH] IN BLOOD BY AUTOMATED COUNT: 13.6 % (ref 12.3–15.4)
GLOBULIN UR ELPH-MCNC: 2.5 GM/DL
GLUCOSE SERPL-MCNC: 214 MG/DL (ref 65–99)
HCT VFR BLD AUTO: 46.7 % (ref 37.5–51)
HGB BLD-MCNC: 15.4 G/DL (ref 13–17.7)
HOLD SPECIMEN: NORMAL
HOLD SPECIMEN: NORMAL
IMM GRANULOCYTES # BLD AUTO: 0.02 10*3/MM3 (ref 0–0.05)
IMM GRANULOCYTES NFR BLD AUTO: 0.4 % (ref 0–0.5)
LYMPHOCYTES # BLD AUTO: 1.54 10*3/MM3 (ref 0.7–3.1)
LYMPHOCYTES NFR BLD AUTO: 27.8 % (ref 19.6–45.3)
MCH RBC QN AUTO: 30.8 PG (ref 26.6–33)
MCHC RBC AUTO-ENTMCNC: 33 G/DL (ref 31.5–35.7)
MCV RBC AUTO: 93.4 FL (ref 79–97)
MONOCYTES # BLD AUTO: 0.65 10*3/MM3 (ref 0.1–0.9)
MONOCYTES NFR BLD AUTO: 11.7 % (ref 5–12)
NEUTROPHILS NFR BLD AUTO: 2.98 10*3/MM3 (ref 1.7–7)
NEUTROPHILS NFR BLD AUTO: 53.8 % (ref 42.7–76)
NRBC BLD AUTO-RTO: 0 /100 WBC (ref 0–0.2)
PLATELET # BLD AUTO: 172 10*3/MM3 (ref 140–450)
PMV BLD AUTO: 10.8 FL (ref 6–12)
POTASSIUM SERPL-SCNC: 4.4 MMOL/L (ref 3.5–5.2)
PROT SERPL-MCNC: 7.1 G/DL (ref 6–8.5)
QT INTERVAL: 408 MS
QTC INTERVAL: 473 MS
RBC # BLD AUTO: 5 10*6/MM3 (ref 4.14–5.8)
SODIUM SERPL-SCNC: 138 MMOL/L (ref 136–145)
TROPONIN T SERPL HS-MCNC: 14 NG/L
WBC NRBC COR # BLD AUTO: 5.54 10*3/MM3 (ref 3.4–10.8)
WHOLE BLOOD HOLD COAG: NORMAL
WHOLE BLOOD HOLD SPECIMEN: NORMAL

## 2025-03-03 PROCEDURE — 84484 ASSAY OF TROPONIN QUANT: CPT

## 2025-03-03 PROCEDURE — 85025 COMPLETE CBC W/AUTO DIFF WBC: CPT

## 2025-03-03 PROCEDURE — 36415 COLL VENOUS BLD VENIPUNCTURE: CPT

## 2025-03-03 PROCEDURE — 93010 ELECTROCARDIOGRAM REPORT: CPT | Performed by: INTERNAL MEDICINE

## 2025-03-03 PROCEDURE — 99211 OFF/OP EST MAY X REQ PHY/QHP: CPT

## 2025-03-03 PROCEDURE — 93005 ELECTROCARDIOGRAM TRACING: CPT

## 2025-03-03 PROCEDURE — 80053 COMPREHEN METABOLIC PANEL: CPT

## 2025-03-03 RX ORDER — ASPIRIN 81 MG/1
324 TABLET, CHEWABLE ORAL ONCE
Status: DISCONTINUED | OUTPATIENT
Start: 2025-03-03 | End: 2025-03-03 | Stop reason: HOSPADM

## 2025-03-03 RX ORDER — SODIUM CHLORIDE 0.9 % (FLUSH) 0.9 %
10 SYRINGE (ML) INJECTION AS NEEDED
Status: DISCONTINUED | OUTPATIENT
Start: 2025-03-03 | End: 2025-03-03 | Stop reason: HOSPADM

## 2025-03-03 NOTE — ED NOTES
Patient left saying he was feeling better and that he would go to [place] if he felt worse

## 2025-03-04 RX ORDER — AMLODIPINE BESYLATE 5 MG/1
5 TABLET ORAL DAILY
Qty: 90 TABLET | Refills: 3 | Status: SHIPPED | OUTPATIENT
Start: 2025-03-04

## 2025-03-10 ENCOUNTER — OFFICE VISIT (OUTPATIENT)
Dept: CARDIOLOGY | Facility: CLINIC | Age: 75
End: 2025-03-10
Payer: MEDICARE

## 2025-03-10 VITALS
SYSTOLIC BLOOD PRESSURE: 112 MMHG | WEIGHT: 166 LBS | DIASTOLIC BLOOD PRESSURE: 70 MMHG | BODY MASS INDEX: 23.77 KG/M2 | HEIGHT: 70 IN | HEART RATE: 71 BPM | OXYGEN SATURATION: 96 %

## 2025-03-10 DIAGNOSIS — I25.10 CORONARY ARTERY DISEASE INVOLVING NATIVE CORONARY ARTERY OF NATIVE HEART WITHOUT ANGINA PECTORIS: Primary | Chronic | ICD-10-CM

## 2025-03-10 PROBLEM — I51.89 DIASTOLIC DYSFUNCTION: Chronic | Status: ACTIVE | Noted: 2025-03-10

## 2025-03-10 PROBLEM — I51.89 DIASTOLIC DYSFUNCTION: Status: ACTIVE | Noted: 2025-03-10

## 2025-03-10 PROCEDURE — 1159F MED LIST DOCD IN RCRD: CPT | Performed by: NURSE PRACTITIONER

## 2025-03-10 PROCEDURE — 3078F DIAST BP <80 MM HG: CPT | Performed by: NURSE PRACTITIONER

## 2025-03-10 PROCEDURE — 1160F RVW MEDS BY RX/DR IN RCRD: CPT | Performed by: NURSE PRACTITIONER

## 2025-03-10 PROCEDURE — 99213 OFFICE O/P EST LOW 20 MIN: CPT | Performed by: NURSE PRACTITIONER

## 2025-03-10 PROCEDURE — 3074F SYST BP LT 130 MM HG: CPT | Performed by: NURSE PRACTITIONER

## 2025-03-10 RX ORDER — ISOSORBIDE DINITRATE 30 MG/1
30 TABLET ORAL 4 TIMES DAILY
COMMUNITY
End: 2025-03-10

## 2025-03-10 RX ORDER — ISOSORBIDE MONONITRATE 30 MG/1
30 TABLET, EXTENDED RELEASE ORAL DAILY
COMMUNITY

## 2025-03-10 RX ORDER — OMEPRAZOLE 40 MG/1
40 CAPSULE, DELAYED RELEASE ORAL DAILY
COMMUNITY

## 2025-03-10 RX ORDER — HYDROXYZINE PAMOATE 50 MG/1
50 CAPSULE ORAL 3 TIMES DAILY PRN
COMMUNITY

## 2025-03-10 NOTE — PROGRESS NOTES
CARDIOLOGY  Pharmacist Note     Alfredito Marcus is a 75 y.o. male seen in the Cardiology Clinic for CAD.  Alfredito Marcus reports a good understanding of medications.  He says his PCP recently started him on amlodipine 5mg daily and his other cardiologist increased his olmesartan to 40mg daily.  He reports his home BPs are running 120s-130s over 70-80s.  He also reports stopping bisoprolol because of low heart rate, saying it was in 30s and 40s.  Since stopping he reports it runs in 60s.       Medication Use:   Hx of med intolerances: Bradycardia with bisoprolol. Genital Yeast with an SGLT2i (cannot recall Farxiga/Jardiance but has been on both.  The other was stopped when Soliqua was started)   Retail Rx Management: Nath[facility]    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    Benign prostatic hyperplasia with urinary obstruction 02/23/2025    CAD (coronary artery disease)     Closed fracture of proximal end of left humerus 09/03/2016    Diabetic peripheral neuropathy associated with type 2 diabetes mellitus 08/27/2022    Dyslipidemia     Erectile dysfunction 01/05/2023    GERD (gastroesophageal reflux disease)     Gout     H/O degenerative disc disease     Degenerative disc disease of the cervical and lumbar spine/chronic back pain    History of gluten intolerance     Hypertension     Iron deficiency anemia, unspecified 11/21/2019    Myocardial infarction     Osteoarthritis     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    Type 2 diabetes mellitus      ALLERGIES: Shrimp (diagnostic), Shrimp extract, Thimerosal, and Iodine  Current Outpatient Medications   Medication Sig Dispense Refill    amLODIPine (NORVASC) 5 MG tablet Take 1 tablet by mouth Daily. 90 tablet 3    aspirin 81 MG EC tablet Take 1 tablet by mouth Daily.      coenzyme Q10 100 MG capsule Take 1 capsule by mouth Daily.      docusate sodium (COLACE) 250 MG capsule Take 1 capsule by mouth 2 (Two) Times a Day.      folic acid  "(FOLVITE) 1 MG tablet Take 1 tablet by mouth Daily.      HYDROcodone-acetaminophen (NORCO)  MG per tablet TAKE 1/2 TABLET BY MOUTH THREE TIMES DAILY AS NEEDED FOR PAIN      hydrOXYzine pamoate (VISTARIL) 50 MG capsule Take 1 capsule by mouth 3 (Three) Times a Day As Needed for Itching.      insulin aspart (novoLOG FLEXPEN) 100 UNIT/ML solution pen-injector sc pen Inject 2 Units under the skin into the appropriate area as directed 1 (One) Time. Patient takes 2 to 4 units for glucose greater than 200      Insulin Glargine-Lixisenatide (Soliqua) 100-33 UNT-MCG/ML solution pen-injector injection Inject 16 Units under the skin into the appropriate area as directed Daily.      isosorbide mononitrate (IMDUR) 30 MG 24 hr tablet Take 1 tablet by mouth Daily.      levocetirizine (XYZAL) 5 MG tablet Take 1 tablet by mouth Every Evening.      multivitamin tablet tablet Take 1 tablet by mouth Daily.      nitroglycerin (NITROSTAT) 0.4 MG SL tablet 1 under the tongue as needed for angina, may repeat q5mins for up three doses 100 tablet 1    olmesartan (BENICAR) 40 MG tablet Take 1 tablet by mouth Daily.      Omega-3-Acid Eth Est, Dietary, 1 G capsule Take 1 capsule by mouth 2 (Two) Times a Day.      omeprazole (priLOSEC) 40 MG capsule Take 1 capsule by mouth Daily.      rosuvastatin (CRESTOR) 20 MG tablet Take 1 tablet by mouth Daily.      sulfaSALAzine (AZULFIDINE ENTABS) 500 MG EC tablet Take 1 tablet by mouth 2 (Two) Times a Day.       No current facility-administered medications for this visit.         Objective  Vitals:    03/10/25 1120 03/10/25 1131   BP: 152/69 112/70   BP Location: Left arm Left arm   Patient Position: Sitting Sitting   Cuff Size: Adult Adult   Pulse: 73 71   SpO2: 96%    Weight: 75.3 kg (166 lb)    Height: 177.8 cm (70\")      Wt Readings from Last 3 Encounters:   03/10/25 75.3 kg (166 lb)   02/24/25 78.1 kg (172 lb 2.9 oz)   01/21/25 73.5 kg (162 lb)         03/10/25  1120   Weight: 75.3 kg (166 lb) "     Lab Results   Component Value Date    GLUCOSE 214 (H) 03/03/2025    BUN 12 03/03/2025    CREATININE 1.00 03/03/2025    EGFRIFNONA 66 04/28/2021    BCR 12.0 03/03/2025    K 4.4 03/03/2025    CO2 29.9 (H) 03/03/2025    CALCIUM 9.0 03/03/2025    ALBUMIN 4.6 03/03/2025    AST 24 03/03/2025    ALT 17 03/03/2025     Lab Results   Component Value Date    WBC 5.54 03/03/2025    HGB 15.4 03/03/2025    HCT 46.7 03/03/2025    MCV 93.4 03/03/2025     03/03/2025     Lab Results   Component Value Date    TROPONINI <0.006 08/20/2018    TROPONINT 14 03/03/2025     Lab Results   Component Value Date    PROBNP 171.1 02/23/2025     Results for orders placed during the hospital encounter of 02/23/25    Adult Transthoracic Echo Complete W/ Cont if Necessary Per Protocol    Interpretation Summary    Left ventricular systolic function is normal. Calculated left ventricular EF = 63%    Left ventricular diastolic function is consistent with (grade II w/high LAP) pseudonormalization.    Estimated right ventricular systolic pressure from tricuspid regurgitation is mildly elevated (35-45 mmHg).    Normal right ventricular cavity size and systolic function noted.    There is no evidence of pericardial effusion         Recommendations:     None at this time.       Patient was educated on cardiac medications and the importance of medication adherence. All questions were addressed and patient expressed understanding.   Thank you for allowing me to participate in the care of your patient,    Yessenia Rosario, PharmD  03/10/25  13:14 EDT

## 2025-03-10 NOTE — PROGRESS NOTES
"Chief Complaint  Follow-up (Patient denies chest pain or SOA today )    Subjective          Alfredito Marcus presents to [facility] for follow up.    History of Present Illness    Mr. Marcus presents for follow-up after having recently been hospitalized due to chest discomfort that was alleviated by going to the bathroom.  During that visit he was seen by Dr. Cota and invasive coronary angiogram was deferred due to atypical symptoms, negative troponins, and invasive coronary angiogram with patent stents within the last year.  He did have a mildly positive stress test.      History of Present Illness  He reports a sensation of fullness and a burning sensation in his chest, which he attributes to gastric issues. He has a known hernia and is currently on omeprazole, having previously been on Protonix for an extended period. He has nitroglycerin available at home. His next scheduled appointment with Dr. Garcia is on 01/20/2026.    He underwent an abnormal stress test during his hospital stay, which was subsequently reviewed by Dr. Cota, who expressed no immediate concerns. Since his discharge, he has not experienced any chest pressure. His last stents were placed approximately 4 to 5 years ago. He is under the care of Dr. Garcia for his cardiology needs.           Tobacco Use: Low Risk  (3/15/2025)    Patient History     Smoking Tobacco Use: Never     Smokeless Tobacco Use: Never     Passive Exposure: Past       Objective     Vital Signs:   /70 (BP Location: Left arm, Patient Position: Sitting, Cuff Size: Adult)   Pulse 71   Ht 177.8 cm (70\")   Wt 75.3 kg (166 lb)   SpO2 96%   BMI 23.82 kg/m²       Physical Exam  Vitals and nursing note reviewed.   Constitutional:       General: He is not in acute distress.  HENT:      Head: Normocephalic and atraumatic.   Eyes:      Conjunctiva/sclera: Conjunctivae normal.   Neck:      Vascular: No carotid bruit.   Cardiovascular:      Rate and " Rhythm: Normal rate and regular rhythm.      Pulses: Normal pulses.   Pulmonary:      Effort: Pulmonary effort is normal.      Breath sounds: Normal breath sounds.   Musculoskeletal:      Cervical back: Neck supple.      Right lower leg: No edema.      Left lower leg: No edema.   Skin:     General: Skin is warm and dry.   Neurological:      General: No focal deficit present.   Psychiatric:         Mood and Affect: Mood normal.         Behavior: Behavior normal.             Result Review :   The following data was reviewed by: ELSY Delgado on 03/10/2025:  Common labs          2/23/2025    22:09 2/24/2025    03:00 3/3/2025    16:43   Common Labs   Glucose 160  100  214    BUN 15  14  12    Creatinine 0.95  0.94  1.00    Sodium 141  140  138    Potassium 3.8  3.9  4.4    Chloride 104  104  100    Calcium 8.6  9.1  9.0    Albumin 3.8  4.0  4.6    Total Bilirubin 0.9  0.7  1.0    Alkaline Phosphatase 86  90  100    AST (SGOT) 20  24  24    ALT (SGPT) 15  16  17    WBC 6.06  7.68  5.54    Hemoglobin 13.4  14.4  15.4    Hematocrit 40.9  43.9  46.7    Platelets 147  148  172    Total Cholesterol  99     Triglycerides  63     HDL Cholesterol  42     LDL Cholesterol   43     Hemoglobin A1C 6.90        Lipid Panel          2/24/2025    03:00   Lipid Panel   Total Cholesterol 99    Triglycerides 63    HDL Cholesterol 42    VLDL Cholesterol 14    LDL Cholesterol  43    LDL/HDL Ratio 1.06        Data reviewed : Cardiology studies as detailed below      Last Cardiac Cath  Results for orders placed during the hospital encounter of 02/12/24    Cardiac Catheterization/Vascular Study    Conclusion  FINAL    Impression  Nonobstructive plaque disease involving multiple vessels without any evidence of hemodynamically significant coronary artery disease.  Normal left ventricular systolic function, estimated EF 65%.    RECOMMENDATIONS:  Medical therapy and risk factor management.  Evaluation for noncardiac etiology of symptoms in  case of recurrent chest pain.    Indications: Chest pain with features of unstable angina.    Access: Right radial.    Procedures:  Left heart catheterization.  Left ventriculogram.  Selective coronary angiography.  Arterial site hemostasis with radial band.    Procedure narrative:  The patient was brought to the catheterization lab in a fasting condition.  Access site was prepped and draped in standard sterile fashion.  Lidocaine was injected and arterial access was obtained by percutaneous anterior wall puncture technique.  A 6 Faroese arterial sheath was placed. Above procedures were performed without complications.  At the conclusion the arterial sheath was removed and hemostasis was achieved.  The patient was transferred to the unit in a stable condition.    Hemodynamic Findings:  Heart Rate: 90/minute.  LV pressure: 120/4-9 mmHg, on pull back no significant gradient was recorded across the aortic valve.    Angiographic Findings:  Right coronary dominance.  LM: Angiographically normal.  LAD: Has been previously stented over a long proximal to distal segment.  There is mild scattered plaque noted throughout the stented vessel with segments of Forni percent mid segment and 30% distal narrowings without hemodynamically significant disease.  LCX: The larger first marginal has smooth 30-40% nonobstructive plaque.  The second and third marginal are free from significant disease.  RCA: 30% nonobstructive proximal and 40% nonobstructive mid segment plaque without occlusive disease.  Minor irregularities and mild plaque of the PDA is noted.  LV: Left ventriculogram performed in 30 MARCOS projection revealed normal global and regional left ventricular systolic function with estimated ejection fraction of 60%.  No mitral regurgitation was noted.    Complications: No acute procedure related complications.      Last Stress test  Results for orders placed during the hospital encounter of 02/23/25    Stress Test With Myocardial  Perfusion One Day    Interpretation Summary  Images from the original result were not included.      Impressions are consistent with an intermediate risk study.    Left ventricular ejection fraction is hyperdynamic (Calculated EF > 70%). TID 0.99.    Myocardial perfusion imaging indicates a small-sized, mildly severe area of ischemia located in the apex.       Last Echo  Results for orders placed during the hospital encounter of 02/23/25    Adult Transthoracic Echo Complete W/ Cont if Necessary Per Protocol    Interpretation Summary    Left ventricular systolic function is normal. Calculated left ventricular EF = 63%    Left ventricular diastolic function is consistent with (grade II w/high LAP) pseudonormalization.    Estimated right ventricular systolic pressure from tricuspid regurgitation is mildly elevated (35-45 mmHg).    Normal right ventricular cavity size and systolic function noted.    There is no evidence of pericardial effusion             Current Outpatient Medications   Medication Sig Dispense Refill    amLODIPine (NORVASC) 5 MG tablet Take 1 tablet by mouth Daily. 90 tablet 3    aspirin 81 MG EC tablet Take 1 tablet by mouth Daily.      coenzyme Q10 100 MG capsule Take 1 capsule by mouth Daily.      docusate sodium (COLACE) 250 MG capsule Take 1 capsule by mouth 2 (Two) Times a Day.      folic acid (FOLVITE) 1 MG tablet Take 1 tablet by mouth Daily.      HYDROcodone-acetaminophen (NORCO)  MG per tablet TAKE 1/2 TABLET BY MOUTH THREE TIMES DAILY AS NEEDED FOR PAIN      hydrOXYzine pamoate (VISTARIL) 50 MG capsule Take 1 capsule by mouth 3 (Three) Times a Day As Needed for Itching.      insulin aspart (novoLOG FLEXPEN) 100 UNIT/ML solution pen-injector sc pen Inject 2 Units under the skin into the appropriate area as directed 1 (One) Time. Patient takes 2 to 4 units for glucose greater than 200      Insulin Glargine-Lixisenatide (Soliqua) 100-33 UNT-MCG/ML solution pen-injector injection Inject 16  Units under the skin into the appropriate area as directed Daily.      isosorbide mononitrate (IMDUR) 30 MG 24 hr tablet Take 1 tablet by mouth Daily.      levocetirizine (XYZAL) 5 MG tablet Take 1 tablet by mouth Every Evening.      multivitamin tablet tablet Take 1 tablet by mouth Daily.      nitroglycerin (NITROSTAT) 0.4 MG SL tablet 1 under the tongue as needed for angina, may repeat q5mins for up three doses 100 tablet 1    olmesartan (BENICAR) 40 MG tablet Take 1 tablet by mouth Daily.      Omega-3-Acid Eth Est, Dietary, 1 G capsule Take 1 capsule by mouth 2 (Two) Times a Day.      omeprazole (priLOSEC) 40 MG capsule Take 1 capsule by mouth Daily.      rosuvastatin (CRESTOR) 20 MG tablet Take 1 tablet by mouth Daily.      sulfaSALAzine (AZULFIDINE ENTABS) 500 MG EC tablet Take 1 tablet by mouth 2 (Two) Times a Day.       No current facility-administered medications for this visit.            Assessment and Plan    Problem List Items Addressed This Visit       CAD (coronary artery disease) - Primary (Chronic)    Overview     Cardiac cath by Dr. Garcia, 06/09/2008, showed 99% stenosis of the LAD treated with placement of two overlapping Cypher drug-coated stents along with balloon angioplasty of the first diagonal.  Rest of the vessels is free from significant disease and LV function was normal.  Cardiac cath by Dr. Garcia, November 2011, showed patent stents of the LAD, no significant disease, normal LV function.  [facility] 5-31-16: Javan: Non- flow-limiting coronary artery disease:.Widely patent LAD stents. Mild (20%-30%) disease in the LAD and RCA. . Normal left ventricular function.   Echo 12-: Normal left ventricular cavity size with borderline concentric hypertrophy. Left ventricular ejection fraction appears to be 56 - 60%. Left ventricular systolic function is normal. Left ventricular diastolic function is consistent with (grade I) impaired relaxation.  [facility] 4-28-21:  90% stenosis of mid LAD successfully  stented with 2.5 x 18 mm LIZZETH.  Normal left ventricular systolic function, estimated EF 60%.  [facility] 02/12/2024-mild scattered plaque with 30 to 40% stenosis of LAD, left circumflex with 30 to 40% nonobstructive plaque, RCA with 30 to 40% nonobstructive plaque  Stress test 02/23/2025-intermediate risk study, small sized, mildly severe area of ischemia in the apex  Echo-02/23/2025-LVEF 63%, grade 2 diastolic dysfunction with high LAP and pseudonormalization, RVSP 35 to 45 mmHg         Relevant Medications    isosorbide mononitrate (IMDUR) 30 MG 24 hr tablet     Diagnoses and all orders for this visit:    1. Coronary artery disease involving native coronary artery of native heart without angina pectoris (Primary)      Assessment & Plan  1. Abnormal stress test.  Given that his stents were patent less than a year ago with no more than 40% blockage, it is unlikely that his condition has significantly deteriorated within this timeframe. He will maintain his current medication regimen without any alterations. He is advised to report any escalation in chest pain or shortness of breath. An email will be sent to Dr. Jacob's office to request an earlier appointment, preferably in 6 months, while retaining his scheduled appointment in January 2026.    2. Gastric discomfort.  He reports a burning sensation in the chest, which is likely gastric in nature. He has a history of a hernia and is currently on omeprazole. He is advised to continue with omeprazole. If symptoms persist, newer medication options may be considered.    PROCEDURE  Stents were placed approximately 4 to 5 years ago.         Follow Up     No follow-ups on file.    Patient was given instructions and counseling regarding his condition or for health maintenance advice. Please see specific information pulled into the AVS if appropriate.       Electronically signed by ELSY Delgado, 03/15/25, 5:47 PM EDT.    Patient or patient representative verbalized consent  for the use of Ambient Listening during the visit with  ELSY Delgado for chart documentation. 3/15/2025  17:47 EDT     Dictated Utilizing Dragon Dictation: Part of this note may be an electronic transcription/translation of spoken language to printed text using the Dragon Dictation System

## 2025-03-26 ENCOUNTER — TELEPHONE (OUTPATIENT)
Dept: CARDIOLOGY | Facility: CLINIC | Age: 75
End: 2025-03-26
Payer: MEDICARE

## 2025-03-26 NOTE — TELEPHONE ENCOUNTER
Caller: Alfredito Marcus    Relationship: Self    Best call back number: [phone] (home)     Who is your current provider: DR. PLATT    Is your current provider offboarding? NO    Who would you like your new provider to be: DR. GOODSON    What are your reasons for transferring care: 'MAINLY TO BE CLOSER TO HOME.'    Additional notes: PLEASE CALL PT TO SCHEDULE NEXT APPT.          
Pt wanted to see us to be clooser to home made apt with jw  
normal (ped)...

## 2025-04-15 ENCOUNTER — OFFICE VISIT (OUTPATIENT)
Dept: CARDIOLOGY | Facility: CLINIC | Age: 75
End: 2025-04-15
Payer: MEDICARE

## 2025-04-15 VITALS
OXYGEN SATURATION: 98 % | SYSTOLIC BLOOD PRESSURE: 130 MMHG | WEIGHT: 178 LBS | HEIGHT: 70 IN | DIASTOLIC BLOOD PRESSURE: 74 MMHG | HEART RATE: 57 BPM | BODY MASS INDEX: 25.48 KG/M2

## 2025-04-15 DIAGNOSIS — I25.10 CORONARY ARTERY DISEASE INVOLVING NATIVE CORONARY ARTERY OF NATIVE HEART WITHOUT ANGINA PECTORIS: Primary | ICD-10-CM

## 2025-04-15 PROCEDURE — G2211 COMPLEX E/M VISIT ADD ON: HCPCS | Performed by: PHYSICIAN ASSISTANT

## 2025-04-15 PROCEDURE — 99214 OFFICE O/P EST MOD 30 MIN: CPT | Performed by: PHYSICIAN ASSISTANT

## 2025-04-15 PROCEDURE — 3078F DIAST BP <80 MM HG: CPT | Performed by: PHYSICIAN ASSISTANT

## 2025-04-15 PROCEDURE — 3075F SYST BP GE 130 - 139MM HG: CPT | Performed by: PHYSICIAN ASSISTANT

## 2025-04-15 RX ORDER — ISOSORBIDE MONONITRATE 60 MG/1
60 TABLET, EXTENDED RELEASE ORAL DAILY
Qty: 30 TABLET | Refills: 2 | Status: SHIPPED | OUTPATIENT
Start: 2025-04-15

## 2025-04-15 RX ORDER — ROSUVASTATIN CALCIUM 10 MG/1
10 TABLET, COATED ORAL DAILY
Qty: 30 TABLET | Refills: 2 | Status: SHIPPED | OUTPATIENT
Start: 2025-04-15

## 2025-04-15 RX ORDER — CLONIDINE HYDROCHLORIDE 0.1 MG/1
TABLET ORAL
COMMUNITY
Start: 2025-04-10

## 2025-04-15 NOTE — PROGRESS NOTES
Pastora Kulkarni MD  Alfredito Marcus  1950  04/15/2025    Patient Active Problem List   Diagnosis    CAD (coronary artery disease)    Essential hypertension    Dyslipidemia    Osteoarthritis    Gout    History of gluten intolerance    H/O degenerative disc disease    Iron deficiency anemia, unspecified    GERD (gastroesophageal reflux disease)    Lymph node symptom    Angiokeratoma of scrotum    Benign prostatic hyperplasia with urinary obstruction    Diabetic peripheral neuropathy associated with type 2 diabetes mellitus    Urinary urgency    Scrotal varicose veins    Erectile dysfunction    Diastolic dysfunction       Dear Pastora Kulkarni MD:    Subjective     History of Present Illness:    Chief Complaint   Patient presents with    [facility]     PREVIOUS TESTS   PROBLEM LIST:  Coronary artery disease:  Unstable angina with abnormal Cardiolite stress test, June 2008.  [facility], 06/09/2008, Dr. Garcia: 99% LAD stenosis s/p 2 overlapping Cypher LIZZETH with balloon angioplasty of D1. Rest of the vessels is free from significant disease and LV function was normal.  Cardiolite stress test, 03/23/2011: Mild apical ischemia. EF 64%.  [facility], 11/2011, Dr. Garcia: Patent stents of the LAD, no significant disease. Normal EF.  [facility], 05/31/2016, Dr. Edouard: Non-flow-limiting CAD. Widely patent LAD stents. Mild (20%-30%) disease in the LAD and RCA. Normal EF.  Echocardiogram, 12/14/2020: EF 56-60%. LVDF consistent with impaired relaxation. No significant valvular heart disease. No pericardial effusion.   [facility], 4/28/2021, Dr. Garcia: EF 65%. 90% stenosis of mid LAD stented with 2.5 x 18 mm LIZZETH and reduced to 0%. Patent stents of the proximal to mid LAD. Residual nonobstructive disease of the distal LAD, the first marginal and the RCA.   Echocardiogram, 05/31/2022; EF 61-65%. Grade I diastolic dysfunction. Trace MR.   Stress test 6/12/2023: normal stress test with no evidence of ischemia  [facility], 02/12/2024: EF 65%. Nonobstructive  plaque disease involving multiple vessels without any evidence of hemodynamically significant coronary artery disease.   Stress 2/24/2025: small sized mildly severe ischemia in apex  Bilateral carotid bruits  Carotid duplex, 12/14/2020: No hemodynamically single plaques or stenoses were demonstrated in carotid systems. Both vertebral artery flows are antegrade.  Carotid CT Angiogram, 10/16/2022; Minimal atherosclerotic disease at the carotid bifurcations. There is no carotid or vertebral arterial stenosis or dissection in the neck. Negative intracranial CTA.  Palpitations  Event monitor, 06/15/2021; SR. Rare APCs. Occasional PVCs with couplets and triplets. Rare SVT up to 17 beats.   Holter 5/4/2023: rare PAC and PVC, one 13 beat run of SVT/PAT, no symptoms  AAA screening  Abdominal US 6/12/2023:Infrarenal abdominal aortic ectasia of 2.7 cm. No aneurysm.   Hypertension.  Dyslipidemia.  DM2  Osteoarthritis.  Gout.  History of gluten intolerance.  H/O degenerative disc disease.  Closed fracture of proximal end of left humerus.  Surgical history  Cholecystectomy  Total replacement of rt hip  Appendectomy    Alfredito Marcus is a pleasant 75 y.o. male with a past medical history significant for coronary artery disease with history of several PCI in the past, essential hypertension, dyslipidemia, diabetes mellitus.  He comes in today for cardiology follow-up.    Alfredito comes in today wishing to establish care in our office due to closer proximity to his home.  Previously has been following regular with Dr. Garcia.  He recently had stress test which did show small size mildly severe area of ischemia in the apex.  Clinically he denies any exertional chest pains although he does report he has had some infrequent episodes of heaviness in his chest but tells me this is only occurring once every couple weeks for a few minutes and resolving on its own.  Denies any worsening shortness of breath from baseline, dizziness, or  syncope.  He does report some daytime fatigue and somnolence which is chronic over the last few years.      Allergies   Allergen Reactions    Shrimp (Diagnostic) Other (See Comments)     Lip swelling and hives    Shrimp Extract Unknown - Low Severity    Thimerosal Unknown - Low Severity    Iodine Unknown - Low Severity   :      Current Outpatient Medications:     amLODIPine (NORVASC) 5 MG tablet, Take 1 tablet by mouth Daily. (Patient taking differently: Take 2 tablets by mouth Daily.), Disp: 90 tablet, Rfl: 3    aspirin 81 MG EC tablet, Take 1 tablet by mouth Daily., Disp: , Rfl:     cloNIDine (CATAPRES) 0.1 MG tablet, , Disp: , Rfl:     coenzyme Q10 100 MG capsule, Take 1 capsule by mouth Daily., Disp: , Rfl:     docusate sodium (COLACE) 250 MG capsule, Take 1 capsule by mouth 2 (Two) Times a Day., Disp: , Rfl:     folic acid (FOLVITE) 1 MG tablet, Take 1 tablet by mouth Daily., Disp: , Rfl:     HYDROcodone-acetaminophen (NORCO)  MG per tablet, TAKE 1/2 TABLET BY MOUTH THREE TIMES DAILY AS NEEDED FOR PAIN, Disp: , Rfl:     hydrOXYzine pamoate (VISTARIL) 50 MG capsule, Take 1 capsule by mouth 3 (Three) Times a Day As Needed for Itching., Disp: , Rfl:     insulin aspart (novoLOG FLEXPEN) 100 UNIT/ML solution pen-injector sc pen, Inject 2 Units under the skin into the appropriate area as directed 1 (One) Time. Patient takes 2 to 4 units for glucose greater than 200, Disp: , Rfl:     Insulin Glargine-Lixisenatide (Soliqua) 100-33 UNT-MCG/ML solution pen-injector injection, Inject 16 Units under the skin into the appropriate area as directed Daily., Disp: , Rfl:     isosorbide mononitrate (IMDUR) 60 MG 24 hr tablet, Take 1 tablet by mouth Daily., Disp: 30 tablet, Rfl: 2    levocetirizine (XYZAL) 5 MG tablet, Take 1 tablet by mouth Every Evening., Disp: , Rfl:     multivitamin tablet tablet, Take 1 tablet by mouth Daily., Disp: , Rfl:     nitroglycerin (NITROSTAT) 0.4 MG SL tablet, 1 under the tongue as needed for  "angina, may repeat q5mins for up three doses, Disp: 100 tablet, Rfl: 1    olmesartan (BENICAR) 40 MG tablet, Take 1 tablet by mouth Daily., Disp: , Rfl:     Omega-3-Acid Eth Est, Dietary, 1 G capsule, Take 1 capsule by mouth 2 (Two) Times a Day., Disp: , Rfl:     omeprazole (priLOSEC) 40 MG capsule, Take 1 capsule by mouth Daily., Disp: , Rfl:     rosuvastatin (CRESTOR) 10 MG tablet, Take 1 tablet by mouth Daily., Disp: 30 tablet, Rfl: 2    sulfaSALAzine (AZULFIDINE ENTABS) 500 MG EC tablet, Take 1 tablet by mouth 2 (Two) Times a Day., Disp: , Rfl:     The following portions of the patient's history were reviewed and updated as appropriate: allergies, current medications, past family history, past medical history, past social history, past surgical history and problem list.    Social History     Tobacco Use    Smoking status: Never     Passive exposure: Past    Smokeless tobacco: Never   Vaping Use    Vaping status: Never Used   Substance Use Topics    Alcohol use: Not Currently    Drug use: Never         Objective   Vitals:    04/15/25 1428   BP: 130/74   Pulse: 57   SpO2: 98%   Weight: 80.7 kg (178 lb)   Height: 177.8 cm (70\")     Body mass index is 25.54 kg/m².    ROS    Constitutional:       General: Not in acute distress.     Appearance: Healthy appearance. Well-developed and not in distress. Not diaphoretic.   Eyes:      Conjunctiva/sclera: Conjunctivae normal.      Pupils: Pupils are equal, round, and reactive to light.   HENT:      Head: Normocephalic and atraumatic.   Neck:      Vascular: No carotid bruit or JVD.   Pulmonary:      Effort: Pulmonary effort is normal. No respiratory distress.      Breath sounds: Normal breath sounds.   Cardiovascular:      Normal rate. Regular rhythm.   Edema:     Peripheral edema absent.   Skin:     General: Skin is cool.   Neurological:      Mental Status: Alert, oriented to person, place, and time and oriented to person, place and time.         Lab Results   Component " "Value Date     03/03/2025    K 4.4 03/03/2025     03/03/2025    CO2 29.9 (H) 03/03/2025    BUN 12 03/03/2025    CREATININE 1.00 03/03/2025    GLUCOSE 214 (H) 03/03/2025    CALCIUM 9.0 03/03/2025    AST 24 03/03/2025    ALT 17 03/03/2025    ALKPHOS 100 03/03/2025     No results found for: \"CKTOTAL\"  Lab Results   Component Value Date    WBC 5.54 03/03/2025    HGB 15.4 03/03/2025    HCT 46.7 03/03/2025     03/03/2025     Lab Results   Component Value Date    INR 0.94 02/11/2024    INR 0.89 (L) 07/22/2023    INR 1.03 10/16/2022     No results found for: \"MG\"  Lab Results   Component Value Date    TSH 4.600 (H) 02/23/2025    CHLPL 116 05/30/2016    TRIG 63 02/24/2025    HDL 42 02/24/2025    LDL 43 02/24/2025      No results found for: \"BNP\"    During this visit the following were done:  Labs Reviewed []    Labs Ordered []    Radiology Reports Reviewed []    Radiology Ordered []    PCP Records Reviewed []    Referring Provider Records Reviewed []    ER Records Reviewed []    Hospital Records Reviewed []    History Obtained From Family []    Radiology Images Reviewed []    Other Reviewed []    Records Requested []       Procedures    Assessment & Plan    Diagnosis Plan   1. Coronary artery disease involving native coronary artery of native heart without angina pectoris  Lipid Panel               Recommendations:  CAD with stable angina  He is reporting some symptoms consistent with angina will increase Imdur to 60 mg  Continue aspirin, olmesartan, metoprolol.  Dyslipidemia  LDL is at goal and he reports leg pains/myalgias we will try lowering dose of Crestor to 10 mg and monitor for symptom improvement.  Will repeat lipid panel in 1 month.  If LDL is not at goal may have to consider PCSK9 inhibitor  Essential hypertension  Did have blood pressure log where his blood pressure looks for the most part well-controlled I asked for him to continue keeping close blood pressure log for next visit    No " follow-ups on file.    As always, I appreciate very much the opportunity to participate in the cardiovascular care of your patients.      With Best Regards,    Rhys Montalvo PA-C

## 2025-06-04 ENCOUNTER — TRANSCRIBE ORDERS (OUTPATIENT)
Dept: ADMINISTRATIVE | Facility: HOSPITAL | Age: 75
End: 2025-06-04
Payer: MEDICARE

## 2025-06-04 DIAGNOSIS — M27.2 ABSCESS OF JAW: Primary | ICD-10-CM

## 2025-06-16 ENCOUNTER — HOSPITAL ENCOUNTER (OUTPATIENT)
Dept: CT IMAGING | Facility: HOSPITAL | Age: 75
Discharge: HOME | End: 2025-06-16
Admitting: INTERNAL MEDICINE
Payer: MEDICARE

## 2025-06-16 DIAGNOSIS — M27.2 ABSCESS OF JAW: ICD-10-CM

## 2025-06-16 PROCEDURE — 70490 CT SOFT TISSUE NECK W/O DYE: CPT

## 2025-06-17 PROCEDURE — 70490 CT SOFT TISSUE NECK W/O DYE: CPT | Performed by: RADIOLOGY

## 2025-07-11 ENCOUNTER — TRANSCRIBE ORDERS (OUTPATIENT)
Dept: ADMINISTRATIVE | Facility: HOSPITAL | Age: 75
End: 2025-07-11
Payer: MEDICARE

## 2025-07-11 ENCOUNTER — HOSPITAL ENCOUNTER (OUTPATIENT)
Dept: GENERAL RADIOLOGY | Facility: HOSPITAL | Age: 75
Discharge: HOME | End: 2025-07-11
Payer: MEDICARE

## 2025-07-11 DIAGNOSIS — M25.569 KNEE PAIN, UNSPECIFIED CHRONICITY, UNSPECIFIED LATERALITY: Primary | ICD-10-CM

## 2025-07-11 DIAGNOSIS — M25.569 KNEE PAIN, UNSPECIFIED CHRONICITY, UNSPECIFIED LATERALITY: ICD-10-CM

## 2025-07-11 PROCEDURE — 73565 X-RAY EXAM OF KNEES: CPT

## 2025-07-15 ENCOUNTER — TELEPHONE (OUTPATIENT)
Dept: CARDIOLOGY | Facility: CLINIC | Age: 75
End: 2025-07-15
Payer: MEDICARE

## 2025-07-15 ENCOUNTER — OFFICE VISIT (OUTPATIENT)
Dept: CARDIOLOGY | Facility: CLINIC | Age: 75
End: 2025-07-15
Payer: MEDICARE

## 2025-07-15 VITALS
OXYGEN SATURATION: 96 % | BODY MASS INDEX: 24.88 KG/M2 | WEIGHT: 173.8 LBS | HEART RATE: 69 BPM | HEIGHT: 70 IN | DIASTOLIC BLOOD PRESSURE: 61 MMHG | SYSTOLIC BLOOD PRESSURE: 132 MMHG

## 2025-07-15 DIAGNOSIS — E78.5 DYSLIPIDEMIA: Chronic | ICD-10-CM

## 2025-07-15 DIAGNOSIS — I25.10 CORONARY ARTERY DISEASE INVOLVING NATIVE CORONARY ARTERY OF NATIVE HEART WITHOUT ANGINA PECTORIS: Primary | Chronic | ICD-10-CM

## 2025-07-15 DIAGNOSIS — I10 ESSENTIAL HYPERTENSION: Chronic | ICD-10-CM

## 2025-07-15 DIAGNOSIS — I51.89 DIASTOLIC DYSFUNCTION: Chronic | ICD-10-CM

## 2025-07-15 RX ORDER — MECLIZINE HYDROCHLORIDE 25 MG/1
1 TABLET ORAL 3 TIMES DAILY
COMMUNITY
Start: 2025-05-02

## 2025-07-15 RX ORDER — RANOLAZINE 500 MG/1
500 TABLET, EXTENDED RELEASE ORAL 2 TIMES DAILY
Qty: 60 TABLET | Refills: 3 | Status: SHIPPED | OUTPATIENT
Start: 2025-07-15

## 2025-07-15 NOTE — PROGRESS NOTES
Pastora Kulkarni MD  Alfredito Marcus  1950  07/15/2025    Patient Active Problem List   Diagnosis    CAD (coronary artery disease)    Essential hypertension    Dyslipidemia    Osteoarthritis    Gout    History of gluten intolerance    H/O degenerative disc disease    Iron deficiency anemia, unspecified    GERD (gastroesophageal reflux disease)    Lymph node symptom    Angiokeratoma of scrotum    Benign prostatic hyperplasia with urinary obstruction    Diabetic peripheral neuropathy associated with type 2 diabetes mellitus    Urinary urgency    Scrotal varicose veins    Erectile dysfunction    Diastolic dysfunction       Dear Pastora Kulkarni MD:    Subjective     History of Present Illness:    Chief Complaint   Patient presents with    Follow-up     routine   PROBLEM LIST:  Coronary artery disease:  Unstable angina with abnormal Cardiolite stress test, June 2008.  [facility], 06/09/2008, Dr. Garcia: 99% LAD stenosis s/p 2 overlapping Cypher LIZZETH with balloon angioplasty of D1. Rest of the vessels is free from significant disease and LV function was normal.  Cardiolite stress test, 03/23/2011: Mild apical ischemia. EF 64%.  [facility], 11/2011, Dr. Garcia: Patent stents of the LAD, no significant disease. Normal EF.  [facility], 05/31/2016, Dr. Edouard: Non-flow-limiting CAD. Widely patent LAD stents. Mild (20%-30%) disease in the LAD and RCA. Normal EF.  Echocardiogram, 12/14/2020: EF 56-60%. LVDF consistent with impaired relaxation. No significant valvular heart disease. No pericardial effusion.   [facility], 4/28/2021, Dr. Garcia: EF 65%. 90% stenosis of mid LAD stented with 2.5 x 18 mm LIZZETH and reduced to 0%. Patent stents of the proximal to mid LAD. Residual nonobstructive disease of the distal LAD, the first marginal and the RCA.   Echocardiogram, 05/31/2022; EF 61-65%. Grade I diastolic dysfunction. Trace MR.   Stress test 6/12/2023: normal stress test with no evidence of ischemia  [facility], 02/12/2024: EF 65%. Nonobstructive plaque  disease involving multiple vessels without any evidence of hemodynamically significant coronary artery disease.   Stress 2/24/2025: small sized mildly severe ischemia in apex  Bilateral carotid bruits  Carotid duplex, 12/14/2020: No hemodynamically single plaques or stenoses were demonstrated in carotid systems. Both vertebral artery flows are antegrade.  Carotid CT Angiogram, 10/16/2022; Minimal atherosclerotic disease at the carotid bifurcations. There is no carotid or vertebral arterial stenosis or dissection in the neck. Negative intracranial CTA.  Palpitations  Event monitor, 06/15/2021; SR. Rare APCs. Occasional PVCs with couplets and triplets. Rare SVT up to 17 beats.   Holter 5/4/2023: rare PAC and PVC, one 13 beat run of SVT/PAT, no symptoms  AAA screening  Abdominal US 6/12/2023:Infrarenal abdominal aortic ectasia of 2.7 cm. No aneurysm.   Hypertension.  Dyslipidemia.  DM2  Osteoarthritis.  Gout.  History of gluten intolerance.  H/O degenerative disc disease.  Closed fracture of proximal end of left humerus.  Surgical history  Cholecystectomy  Total replacement of rt hip  Appendectomy    Alfredito Marcus is a pleasant 75 y.o. male with a past medical history significant for coronary artery disease with history of several PCI in the past, essential hypertension, dyslipidemia, diabetes mellitus. He comes in today for cardiology follow-up.   History of Present Illness  The patient presents for evaluation of hypertension, ankle edema, and CAD.    At last visit I did increase isosorbide to 60 mg for chest pressure that he was reporting.  However he developed some hypotension so he self reduced this back to 30 mg with improvement.  He does still report some chest pain that he describes as pressure in the center of his chest but this has been chronic for years.     The patient has noted evening edema in his left ankle.  This too has been chronic over many months on further questioning he does report long history  of injury to left knee.  Again he states that this edema typically resolves in the morning and progresses towards evening.  He reports no recent changes in respiratory status or significant chest pain. He denies dyspnea, palpitations, arrhythmias, syncope, or falls. He is able to perform household chores without difficulty.      The patient has been managing vertigo for the past couple of years with the Epley maneuver.    SOCIAL HISTORY  Marital Status: Engaged, planning to remarry.       Allergies   Allergen Reactions    Shrimp (Diagnostic) Other (See Comments)     Lip swelling and hives    Shrimp Extract Unknown - Low Severity    Thimerosal Unknown - Low Severity    Iodine Unknown - Low Severity   :      Current Outpatient Medications:     amLODIPine (NORVASC) 5 MG tablet, Take 1 tablet by mouth Daily. (Patient taking differently: Take 2 tablets by mouth Daily.), Disp: 90 tablet, Rfl: 3    aspirin 81 MG EC tablet, Take 1 tablet by mouth Daily., Disp: , Rfl:     cloNIDine (CATAPRES) 0.1 MG tablet, , Disp: , Rfl:     coenzyme Q10 100 MG capsule, Take 1 capsule by mouth Daily., Disp: , Rfl:     docusate sodium (COLACE) 250 MG capsule, Take 1 capsule by mouth 2 (Two) Times a Day., Disp: , Rfl:     folic acid (FOLVITE) 1 MG tablet, Take 1 tablet by mouth Daily., Disp: , Rfl:     HYDROcodone-acetaminophen (NORCO)  MG per tablet, TAKE 1/2 TABLET BY MOUTH THREE TIMES DAILY AS NEEDED FOR PAIN, Disp: , Rfl:     hydrOXYzine pamoate (VISTARIL) 50 MG capsule, Take 1 capsule by mouth 3 (Three) Times a Day As Needed for Itching., Disp: , Rfl:     insulin aspart (novoLOG FLEXPEN) 100 UNIT/ML solution pen-injector sc pen, Inject 2 Units under the skin into the appropriate area as directed 1 (One) Time. Patient takes 2 to 4 units for glucose greater than 200, Disp: , Rfl:     Insulin Glargine-Lixisenatide (Soliqua) 100-33 UNT-MCG/ML solution pen-injector injection, Inject 16 Units under the skin into the appropriate area as  "directed Daily., Disp: , Rfl:     levocetirizine (XYZAL) 5 MG tablet, Take 1 tablet by mouth Every Evening., Disp: , Rfl:     meclizine (ANTIVERT) 25 MG tablet, Take 1 tablet by mouth 3 times a day., Disp: , Rfl:     multivitamin tablet tablet, Take 1 tablet by mouth Daily., Disp: , Rfl:     nitroglycerin (NITROSTAT) 0.4 MG SL tablet, 1 under the tongue as needed for angina, may repeat q5mins for up three doses, Disp: 100 tablet, Rfl: 1    olmesartan (BENICAR) 40 MG tablet, Take 1 tablet by mouth Daily., Disp: , Rfl:     Omega-3-Acid Eth Est, Dietary, 1 G capsule, Take 1 capsule by mouth 2 (Two) Times a Day., Disp: , Rfl:     omeprazole (priLOSEC) 40 MG capsule, Take 1 capsule by mouth Daily., Disp: , Rfl:     rosuvastatin (CRESTOR) 10 MG tablet, Take 1 tablet by mouth Daily., Disp: 30 tablet, Rfl: 2    sulfaSALAzine (AZULFIDINE ENTABS) 500 MG EC tablet, Take 1 tablet by mouth 2 (Two) Times a Day., Disp: , Rfl:     ranolazine (Ranexa) 500 MG 12 hr tablet, Take 1 tablet by mouth 2 (Two) Times a Day., Disp: 60 tablet, Rfl: 3    The following portions of the patient's history were reviewed and updated as appropriate: allergies, current medications, past family history, past medical history, past social history, past surgical history and problem list.    Social History     Tobacco Use    Smoking status: Never     Passive exposure: Past    Smokeless tobacco: Never   Vaping Use    Vaping status: Never Used   Substance Use Topics    Alcohol use: Not Currently    Drug use: Never         Objective   Vitals:    07/15/25 1453   BP: 132/61   Pulse: 69   SpO2: 96%   Weight: 78.8 kg (173 lb 12.8 oz)   Height: 177.8 cm (70\")     Body mass index is 24.94 kg/m².    ROS    Physical Exam    Lab Results   Component Value Date     03/03/2025    K 4.4 03/03/2025     03/03/2025    CO2 29.9 (H) 03/03/2025    BUN 12 03/03/2025    CREATININE 1.00 03/03/2025    GLUCOSE 214 (H) 03/03/2025    CALCIUM 9.0 03/03/2025    AST 24 " "03/03/2025    ALT 17 03/03/2025    ALKPHOS 100 03/03/2025     No results found for: \"CKTOTAL\"  Lab Results   Component Value Date    WBC 5.54 03/03/2025    HGB 15.4 03/03/2025    HCT 46.7 03/03/2025     03/03/2025     Lab Results   Component Value Date    INR 0.94 02/11/2024    INR 0.89 (L) 07/22/2023    INR 1.03 10/16/2022     No results found for: \"MG\"  Lab Results   Component Value Date    TSH 4.600 (H) 02/23/2025    CHLPL 116 05/30/2016    TRIG 63 02/24/2025    HDL 42 02/24/2025    LDL 43 02/24/2025      No results found for: \"BNP\"    During this visit the following were done:  Labs Reviewed []    Labs Ordered []    Radiology Reports Reviewed []    Radiology Ordered []    PCP Records Reviewed []    Referring Provider Records Reviewed []    ER Records Reviewed []    Hospital Records Reviewed []    History Obtained From Family []    Radiology Images Reviewed []    Other Reviewed []    Records Requested []       Procedures    Assessment & Plan    Diagnosis Plan   1. Coronary artery disease involving native coronary artery of native heart without angina pectoris        2. Diastolic dysfunction        3. Dyslipidemia        4. Essential hypertension                 Assessment & Plan    1.  Coronary artery disease  - Occasional chest heaviness without major chest pains.  This is chronic over several years  - Prescribe Ranexa 500 mg twice daily as an alternative to isosorbide, less likely to lower blood pressure and may help with chest heaviness.    2.  Pedal edema  - Evening ankle swelling, possibly a side effect of amlodipine 10 mg.  - No associated breathing changes or major chest pains.  - I did offer reducing or discontinuing amlodipine however he he declined for now as blood pressure has been maintaining well-controlled  - Monitor blood pressure closely.  - Maintain current dosage if swelling persists but is not bothersome.    3.  Erectile dysfunction  - He reports that he plans on getting  soon and " wishes to try to be on Cialis.  - Will discontinue isosorbide mononitrate to avoid any nitrates with Cialis  - I advised him to not take any Cialis if he has been having any chest pain or pressure.  However if he is asymptomatic reasonable to start this.  Although I did explain to him that Cialis carries increased risk of cardiovascular disease. I advised him that if he develops any chest pains after taking to go to the ER.           Return in about 6 months (around 1/15/2026).    As always, I appreciate very much the opportunity to participate in the cardiovascular care of your patients.      With Best Regards,    Rhys Montalvo PA-C    Patient or patient representative verbalized consent for the use of Ambient Listening during the visit with  Rhys Montalvo PA-C for chart documentation. 7/16/2025  10:18 EDT

## 2025-07-16 RX ORDER — TADALAFIL 5 MG/1
5 TABLET ORAL DAILY PRN
Qty: 30 TABLET | Refills: 1 | Status: SHIPPED | OUTPATIENT
Start: 2025-07-16

## 2025-08-22 RX ORDER — ISOSORBIDE MONONITRATE 60 MG/1
60 TABLET, EXTENDED RELEASE ORAL DAILY
Qty: 30 TABLET | Refills: 2 | OUTPATIENT
Start: 2025-08-22

## 2025-08-28 ENCOUNTER — TELEPHONE (OUTPATIENT)
Dept: CARDIOLOGY | Facility: CLINIC | Age: 75
End: 2025-08-28
Payer: MEDICARE

## (undated) DEVICE — KT CONTRST INJ ISOL/MANFLD W/TRANSDCR

## (undated) DEVICE — CATH DIAG EXPO M/ PK 5F FL4/FR4 PIG

## (undated) DEVICE — INTRO SHEATH PRELUDE IDEAL SPRNG COIL 021 6F 23X80CM

## (undated) DEVICE — DEV COMPR RAD TR BND REG 24CM

## (undated) DEVICE — PK CATH CARD 10

## (undated) DEVICE — MODEL BT2000 P/N 700287-012KIT CONTENTS: MANIFOLD WITH SALINE AND CONTRAST PORTS, SALINE TUBING WITH SPIKE AND HAND SYRINGE, TRANSDUCER: Brand: BT2000 AUTOMATED MANIFOLD KIT

## (undated) DEVICE — DEV INFL MONARCH 25W

## (undated) DEVICE — ST INF PRI SMRTSTE 20DRP 2VLV 24ML 117

## (undated) DEVICE — ELECTRD DEFIB ZOLL/CONN RADIOPRNT LG/BKPAD A/

## (undated) DEVICE — GW INQWIRE FC PTFE STD J/1.5 .035 260

## (undated) DEVICE — CATH DIAG EXPO .045 FL3  5F 100CM

## (undated) DEVICE — KT CONTRL HND ACIST CVI PREM HIPRESS 65

## (undated) DEVICE — MODEL AT P65, P/N 701554-001KIT CONTENTS: HAND CONTROLLER, 3-WAY HIGH-PRESSURE STOPCOCK WITH ROTATING END AND PREMIUM HIGH-PRESSURE TUBING: Brand: ANGIOTOUCH® KIT

## (undated) DEVICE — CATH DIAG EXPO .045 AL1 5F 100CM

## (undated) DEVICE — GUIDE CATHETER: Brand: MACH1™

## (undated) DEVICE — MINI TREK CORONARY DILATATION CATHETER 2.0 MM X 12 MM / RAPID-EXCHANGE: Brand: MINI TREK

## (undated) DEVICE — DEV COMP RAD PRELUDESYNC 24CM

## (undated) DEVICE — GW LUGE .014 182 CM